# Patient Record
Sex: FEMALE | Race: WHITE | Employment: OTHER | URBAN - METROPOLITAN AREA
[De-identification: names, ages, dates, MRNs, and addresses within clinical notes are randomized per-mention and may not be internally consistent; named-entity substitution may affect disease eponyms.]

---

## 2017-03-28 ENCOUNTER — OFFICE VISIT (OUTPATIENT)
Dept: ORTHOPEDIC SURGERY | Age: 77
End: 2017-03-28

## 2017-03-28 VITALS
DIASTOLIC BLOOD PRESSURE: 85 MMHG | SYSTOLIC BLOOD PRESSURE: 138 MMHG | WEIGHT: 244.93 LBS | HEART RATE: 85 BPM | BODY MASS INDEX: 48.09 KG/M2 | HEIGHT: 60 IN

## 2017-03-28 DIAGNOSIS — Z98.1 S/P ANKLE FUSION: Primary | ICD-10-CM

## 2017-03-28 PROCEDURE — 99213 OFFICE O/P EST LOW 20 MIN: CPT | Performed by: ORTHOPAEDIC SURGERY

## 2017-03-28 PROCEDURE — 73610 X-RAY EXAM OF ANKLE: CPT | Performed by: ORTHOPAEDIC SURGERY

## 2018-06-20 ENCOUNTER — OFFICE VISIT (OUTPATIENT)
Dept: ORTHOPEDIC SURGERY | Age: 78
End: 2018-06-20

## 2018-06-20 VITALS
WEIGHT: 244.93 LBS | HEIGHT: 60 IN | SYSTOLIC BLOOD PRESSURE: 101 MMHG | BODY MASS INDEX: 48.09 KG/M2 | DIASTOLIC BLOOD PRESSURE: 78 MMHG | HEART RATE: 74 BPM

## 2018-06-20 DIAGNOSIS — M79.671 RIGHT FOOT PAIN: ICD-10-CM

## 2018-06-20 DIAGNOSIS — S92.324A CLOSED NONDISPLACED FRACTURE OF SECOND METATARSAL BONE OF RIGHT FOOT, INITIAL ENCOUNTER: ICD-10-CM

## 2018-06-20 DIAGNOSIS — Z98.1 S/P ANKLE FUSION: Primary | ICD-10-CM

## 2018-06-20 PROCEDURE — 28470 CLTX METATARSAL FX WO MNP EA: CPT | Performed by: ORTHOPAEDIC SURGERY

## 2018-06-20 PROCEDURE — 99213 OFFICE O/P EST LOW 20 MIN: CPT | Performed by: ORTHOPAEDIC SURGERY

## 2018-06-21 PROBLEM — S92.324A CLOSED NONDISPLACED FRACTURE OF SECOND METATARSAL BONE OF RIGHT FOOT: Status: ACTIVE | Noted: 2018-06-21

## 2018-07-31 ENCOUNTER — OFFICE VISIT (OUTPATIENT)
Dept: ORTHOPEDIC SURGERY | Age: 78
End: 2018-07-31

## 2018-07-31 VITALS — WEIGHT: 244 LBS | HEIGHT: 60 IN | BODY MASS INDEX: 47.91 KG/M2

## 2018-07-31 DIAGNOSIS — S92.324A CLOSED NONDISPLACED FRACTURE OF SECOND METATARSAL BONE OF RIGHT FOOT, INITIAL ENCOUNTER: Primary | ICD-10-CM

## 2018-07-31 PROCEDURE — 99024 POSTOP FOLLOW-UP VISIT: CPT | Performed by: ORTHOPAEDIC SURGERY

## 2019-07-22 LAB
LEFT VENTRICULAR EJECTION FRACTION, EXTERNAL: 58
LEFT VENTRICULAR EJECTION FRACTION, EXTERNAL: 58
LEFT VENTRICULAR EJECTION FRACTION, EXTERNAL: NORMAL

## 2021-06-19 ENCOUNTER — APPOINTMENT (OUTPATIENT)
Dept: GENERAL RADIOLOGY | Age: 81
DRG: 194 | End: 2021-06-19
Payer: MEDICARE

## 2021-06-19 ENCOUNTER — HOSPITAL ENCOUNTER (INPATIENT)
Age: 81
LOS: 4 days | Discharge: NURSING, REHAB OR LONG-TERM CARE | DRG: 194 | End: 2021-06-23
Attending: EMERGENCY MEDICINE | Admitting: INTERNAL MEDICINE
Payer: MEDICARE

## 2021-06-19 DIAGNOSIS — G95.19 NEUROGENIC CLAUDICATION (HCC): ICD-10-CM

## 2021-06-19 DIAGNOSIS — J18.9 COMMUNITY ACQUIRED PNEUMONIA OF RIGHT LOWER LOBE OF LUNG: Primary | ICD-10-CM

## 2021-06-19 PROBLEM — E66.01 MORBID OBESITY WITH BMI OF 45.0-49.9, ADULT (HCC): Status: ACTIVE | Noted: 2021-06-19

## 2021-06-19 LAB
A/G RATIO: 1.3 (ref 1.1–2.2)
ALBUMIN SERPL-MCNC: 3.5 G/DL (ref 3.4–5)
ALP BLD-CCNC: 76 U/L (ref 40–129)
ALT SERPL-CCNC: 11 U/L (ref 10–40)
ANION GAP SERPL CALCULATED.3IONS-SCNC: 6 MMOL/L (ref 3–16)
AST SERPL-CCNC: 22 U/L (ref 15–37)
BASOPHILS ABSOLUTE: 0.1 K/UL (ref 0–0.2)
BASOPHILS RELATIVE PERCENT: 0.3 %
BILIRUB SERPL-MCNC: 0.7 MG/DL (ref 0–1)
BUN BLDV-MCNC: 15 MG/DL (ref 7–20)
CALCIUM SERPL-MCNC: 9.1 MG/DL (ref 8.3–10.6)
CHLORIDE BLD-SCNC: 105 MMOL/L (ref 99–110)
CO2: 29 MMOL/L (ref 21–32)
CREAT SERPL-MCNC: 0.7 MG/DL (ref 0.6–1.2)
EOSINOPHILS ABSOLUTE: 0 K/UL (ref 0–0.6)
EOSINOPHILS RELATIVE PERCENT: 0.1 %
GFR AFRICAN AMERICAN: >60
GFR NON-AFRICAN AMERICAN: >60
GLOBULIN: 2.7 G/DL
GLUCOSE BLD-MCNC: 157 MG/DL (ref 70–99)
HCT VFR BLD CALC: 33.8 % (ref 36–48)
HEMOGLOBIN: 11 G/DL (ref 12–16)
LACTIC ACID, SEPSIS: 1.1 MMOL/L (ref 0.4–1.9)
LYMPHOCYTES ABSOLUTE: 1.8 K/UL (ref 1–5.1)
LYMPHOCYTES RELATIVE PERCENT: 8.7 %
MCH RBC QN AUTO: 30.1 PG (ref 26–34)
MCHC RBC AUTO-ENTMCNC: 32.6 G/DL (ref 31–36)
MCV RBC AUTO: 92.6 FL (ref 80–100)
MONOCYTES ABSOLUTE: 1.3 K/UL (ref 0–1.3)
MONOCYTES RELATIVE PERCENT: 6.2 %
NEUTROPHILS ABSOLUTE: 17.6 K/UL (ref 1.7–7.7)
NEUTROPHILS RELATIVE PERCENT: 84.7 %
PDW BLD-RTO: 15.5 % (ref 12.4–15.4)
PLATELET # BLD: 204 K/UL (ref 135–450)
PMV BLD AUTO: 8.2 FL (ref 5–10.5)
POTASSIUM REFLEX MAGNESIUM: 4 MMOL/L (ref 3.5–5.1)
PRO-BNP: 390 PG/ML (ref 0–449)
PROCALCITONIN: 0.61 NG/ML (ref 0–0.15)
RBC # BLD: 3.65 M/UL (ref 4–5.2)
SODIUM BLD-SCNC: 140 MMOL/L (ref 136–145)
TOTAL PROTEIN: 6.2 G/DL (ref 6.4–8.2)
TROPONIN: <0.01 NG/ML
WBC # BLD: 20.8 K/UL (ref 4–11)

## 2021-06-19 PROCEDURE — 6370000000 HC RX 637 (ALT 250 FOR IP): Performed by: EMERGENCY MEDICINE

## 2021-06-19 PROCEDURE — 83605 ASSAY OF LACTIC ACID: CPT

## 2021-06-19 PROCEDURE — 71045 X-RAY EXAM CHEST 1 VIEW: CPT

## 2021-06-19 PROCEDURE — 87040 BLOOD CULTURE FOR BACTERIA: CPT

## 2021-06-19 PROCEDURE — 84484 ASSAY OF TROPONIN QUANT: CPT

## 2021-06-19 PROCEDURE — 84145 PROCALCITONIN (PCT): CPT

## 2021-06-19 PROCEDURE — 83880 ASSAY OF NATRIURETIC PEPTIDE: CPT

## 2021-06-19 PROCEDURE — 93005 ELECTROCARDIOGRAM TRACING: CPT | Performed by: EMERGENCY MEDICINE

## 2021-06-19 PROCEDURE — 2580000003 HC RX 258: Performed by: EMERGENCY MEDICINE

## 2021-06-19 PROCEDURE — 94761 N-INVAS EAR/PLS OXIMETRY MLT: CPT

## 2021-06-19 PROCEDURE — 36415 COLL VENOUS BLD VENIPUNCTURE: CPT

## 2021-06-19 PROCEDURE — 1200000000 HC SEMI PRIVATE

## 2021-06-19 PROCEDURE — 96365 THER/PROPH/DIAG IV INF INIT: CPT

## 2021-06-19 PROCEDURE — 6360000002 HC RX W HCPCS: Performed by: EMERGENCY MEDICINE

## 2021-06-19 PROCEDURE — 85025 COMPLETE CBC W/AUTO DIFF WBC: CPT

## 2021-06-19 PROCEDURE — 2700000000 HC OXYGEN THERAPY PER DAY

## 2021-06-19 PROCEDURE — 80053 COMPREHEN METABOLIC PANEL: CPT

## 2021-06-19 PROCEDURE — 99285 EMERGENCY DEPT VISIT HI MDM: CPT

## 2021-06-19 RX ORDER — NICOTINE POLACRILEX 4 MG
15 LOZENGE BUCCAL PRN
Status: DISCONTINUED | OUTPATIENT
Start: 2021-06-19 | End: 2021-06-23 | Stop reason: HOSPADM

## 2021-06-19 RX ORDER — ONDANSETRON 2 MG/ML
4 INJECTION INTRAMUSCULAR; INTRAVENOUS EVERY 6 HOURS PRN
Status: DISCONTINUED | OUTPATIENT
Start: 2021-06-19 | End: 2021-06-23 | Stop reason: HOSPADM

## 2021-06-19 RX ORDER — DEXTROSE MONOHYDRATE 50 MG/ML
100 INJECTION, SOLUTION INTRAVENOUS PRN
Status: DISCONTINUED | OUTPATIENT
Start: 2021-06-19 | End: 2021-06-23 | Stop reason: HOSPADM

## 2021-06-19 RX ORDER — GLIPIZIDE 5 MG/1
2.5 TABLET ORAL
Status: DISCONTINUED | OUTPATIENT
Start: 2021-06-20 | End: 2021-06-20

## 2021-06-19 RX ORDER — GABAPENTIN 300 MG/1
600 CAPSULE ORAL 3 TIMES DAILY
Status: DISCONTINUED | OUTPATIENT
Start: 2021-06-20 | End: 2021-06-22

## 2021-06-19 RX ORDER — DEXTROSE MONOHYDRATE 25 G/50ML
12.5 INJECTION, SOLUTION INTRAVENOUS PRN
Status: DISCONTINUED | OUTPATIENT
Start: 2021-06-19 | End: 2021-06-23 | Stop reason: HOSPADM

## 2021-06-19 RX ORDER — ACETAMINOPHEN 325 MG/1
650 TABLET ORAL EVERY 6 HOURS PRN
Status: DISCONTINUED | OUTPATIENT
Start: 2021-06-19 | End: 2021-06-23 | Stop reason: HOSPADM

## 2021-06-19 RX ORDER — HYDROCODONE BITARTRATE AND ACETAMINOPHEN 7.5; 325 MG/1; MG/1
1 TABLET ORAL 3 TIMES DAILY PRN
Status: DISCONTINUED | OUTPATIENT
Start: 2021-06-19 | End: 2021-06-20

## 2021-06-19 RX ORDER — SODIUM CHLORIDE 9 MG/ML
25 INJECTION, SOLUTION INTRAVENOUS PRN
Status: DISCONTINUED | OUTPATIENT
Start: 2021-06-19 | End: 2021-06-23 | Stop reason: HOSPADM

## 2021-06-19 RX ORDER — HYDROCODONE BITARTRATE AND ACETAMINOPHEN 7.5; 325 MG/1; MG/1
1 TABLET ORAL ONCE
Status: COMPLETED | OUTPATIENT
Start: 2021-06-19 | End: 2021-06-19

## 2021-06-19 RX ORDER — ONDANSETRON 4 MG/1
4 TABLET, ORALLY DISINTEGRATING ORAL EVERY 8 HOURS PRN
Status: DISCONTINUED | OUTPATIENT
Start: 2021-06-19 | End: 2021-06-23 | Stop reason: HOSPADM

## 2021-06-19 RX ORDER — CYCLOBENZAPRINE HCL 10 MG
10 TABLET ORAL 3 TIMES DAILY PRN
Status: DISCONTINUED | OUTPATIENT
Start: 2021-06-19 | End: 2021-06-22

## 2021-06-19 RX ORDER — POLYETHYLENE GLYCOL 3350 17 G/17G
17 POWDER, FOR SOLUTION ORAL DAILY PRN
Status: DISCONTINUED | OUTPATIENT
Start: 2021-06-19 | End: 2021-06-23 | Stop reason: HOSPADM

## 2021-06-19 RX ORDER — NIFEDIPINE 30 MG/1
30 TABLET, EXTENDED RELEASE ORAL DAILY
Status: DISCONTINUED | OUTPATIENT
Start: 2021-06-20 | End: 2021-06-22

## 2021-06-19 RX ORDER — LEVOTHYROXINE SODIUM 0.12 MG/1
250 TABLET ORAL DAILY
Status: DISCONTINUED | OUTPATIENT
Start: 2021-06-20 | End: 2021-06-23 | Stop reason: HOSPADM

## 2021-06-19 RX ORDER — SODIUM CHLORIDE 0.9 % (FLUSH) 0.9 %
10 SYRINGE (ML) INJECTION EVERY 12 HOURS SCHEDULED
Status: DISCONTINUED | OUTPATIENT
Start: 2021-06-20 | End: 2021-06-23 | Stop reason: HOSPADM

## 2021-06-19 RX ORDER — SODIUM CHLORIDE 0.9 % (FLUSH) 0.9 %
10 SYRINGE (ML) INJECTION PRN
Status: DISCONTINUED | OUTPATIENT
Start: 2021-06-19 | End: 2021-06-23 | Stop reason: HOSPADM

## 2021-06-19 RX ORDER — SODIUM CHLORIDE 9 MG/ML
INJECTION, SOLUTION INTRAVENOUS CONTINUOUS
Status: DISCONTINUED | OUTPATIENT
Start: 2021-06-20 | End: 2021-06-21

## 2021-06-19 RX ORDER — ACETAMINOPHEN 650 MG/1
650 SUPPOSITORY RECTAL EVERY 6 HOURS PRN
Status: DISCONTINUED | OUTPATIENT
Start: 2021-06-19 | End: 2021-06-23 | Stop reason: HOSPADM

## 2021-06-19 RX ORDER — DOXYCYCLINE HYCLATE 100 MG
100 TABLET ORAL EVERY 12 HOURS SCHEDULED
Status: DISCONTINUED | OUTPATIENT
Start: 2021-06-20 | End: 2021-06-23 | Stop reason: HOSPADM

## 2021-06-19 RX ORDER — IPRATROPIUM BROMIDE AND ALBUTEROL SULFATE 2.5; .5 MG/3ML; MG/3ML
1 SOLUTION RESPIRATORY (INHALATION)
Status: DISCONTINUED | OUTPATIENT
Start: 2021-06-20 | End: 2021-06-20

## 2021-06-19 RX ADMIN — HYDROCODONE BITARTRATE AND ACETAMINOPHEN 1 TABLET: 7.5; 325 TABLET ORAL at 22:14

## 2021-06-19 RX ADMIN — CEFTRIAXONE SODIUM 1000 MG: 1 INJECTION, POWDER, FOR SOLUTION INTRAMUSCULAR; INTRAVENOUS at 22:10

## 2021-06-19 RX ADMIN — AZITHROMYCIN MONOHYDRATE 500 MG: 500 INJECTION, POWDER, LYOPHILIZED, FOR SOLUTION INTRAVENOUS at 22:41

## 2021-06-19 ASSESSMENT — PAIN SCALES - GENERAL
PAINLEVEL_OUTOF10: 6
PAINLEVEL_OUTOF10: 4

## 2021-06-20 LAB
ANION GAP SERPL CALCULATED.3IONS-SCNC: 7 MMOL/L (ref 3–16)
BASOPHILS ABSOLUTE: 0 K/UL (ref 0–0.2)
BASOPHILS RELATIVE PERCENT: 0.2 %
BUN BLDV-MCNC: 13 MG/DL (ref 7–20)
CALCIUM SERPL-MCNC: 9.3 MG/DL (ref 8.3–10.6)
CHLORIDE BLD-SCNC: 107 MMOL/L (ref 99–110)
CO2: 29 MMOL/L (ref 21–32)
CREAT SERPL-MCNC: 0.6 MG/DL (ref 0.6–1.2)
EKG ATRIAL RATE: 61 BPM
EKG DIAGNOSIS: NORMAL
EKG P AXIS: 19 DEGREES
EKG P-R INTERVAL: 178 MS
EKG Q-T INTERVAL: 408 MS
EKG QRS DURATION: 98 MS
EKG QTC CALCULATION (BAZETT): 410 MS
EKG R AXIS: -28 DEGREES
EKG T AXIS: 11 DEGREES
EKG VENTRICULAR RATE: 61 BPM
EOSINOPHILS ABSOLUTE: 0.1 K/UL (ref 0–0.6)
EOSINOPHILS RELATIVE PERCENT: 0.5 %
GFR AFRICAN AMERICAN: >60
GFR NON-AFRICAN AMERICAN: >60
GLUCOSE BLD-MCNC: 105 MG/DL (ref 70–99)
GLUCOSE BLD-MCNC: 126 MG/DL (ref 70–99)
GLUCOSE BLD-MCNC: 144 MG/DL (ref 70–99)
GLUCOSE BLD-MCNC: 149 MG/DL (ref 70–99)
GLUCOSE BLD-MCNC: 169 MG/DL (ref 70–99)
GLUCOSE BLD-MCNC: 97 MG/DL (ref 70–99)
HCT VFR BLD CALC: 35 % (ref 36–48)
HEMOGLOBIN: 11.4 G/DL (ref 12–16)
LYMPHOCYTES ABSOLUTE: 2 K/UL (ref 1–5.1)
LYMPHOCYTES RELATIVE PERCENT: 12.1 %
MCH RBC QN AUTO: 30.4 PG (ref 26–34)
MCHC RBC AUTO-ENTMCNC: 32.6 G/DL (ref 31–36)
MCV RBC AUTO: 93.3 FL (ref 80–100)
MONOCYTES ABSOLUTE: 1.2 K/UL (ref 0–1.3)
MONOCYTES RELATIVE PERCENT: 7.3 %
NEUTROPHILS ABSOLUTE: 13.5 K/UL (ref 1.7–7.7)
NEUTROPHILS RELATIVE PERCENT: 79.9 %
PDW BLD-RTO: 15.6 % (ref 12.4–15.4)
PERFORMED ON: ABNORMAL
PERFORMED ON: NORMAL
PLATELET # BLD: 203 K/UL (ref 135–450)
PMV BLD AUTO: 8.1 FL (ref 5–10.5)
POTASSIUM REFLEX MAGNESIUM: 3.9 MMOL/L (ref 3.5–5.1)
RBC # BLD: 3.75 M/UL (ref 4–5.2)
SODIUM BLD-SCNC: 143 MMOL/L (ref 136–145)
WBC # BLD: 16.9 K/UL (ref 4–11)

## 2021-06-20 PROCEDURE — 6370000000 HC RX 637 (ALT 250 FOR IP): Performed by: INTERNAL MEDICINE

## 2021-06-20 PROCEDURE — 85025 COMPLETE CBC W/AUTO DIFF WBC: CPT

## 2021-06-20 PROCEDURE — 1200000000 HC SEMI PRIVATE

## 2021-06-20 PROCEDURE — 2580000003 HC RX 258: Performed by: INTERNAL MEDICINE

## 2021-06-20 PROCEDURE — 2580000003 HC RX 258: Performed by: HOSPITALIST

## 2021-06-20 PROCEDURE — 94640 AIRWAY INHALATION TREATMENT: CPT

## 2021-06-20 PROCEDURE — 87449 NOS EACH ORGANISM AG IA: CPT

## 2021-06-20 PROCEDURE — 6360000002 HC RX W HCPCS: Performed by: INTERNAL MEDICINE

## 2021-06-20 PROCEDURE — 80048 BASIC METABOLIC PNL TOTAL CA: CPT

## 2021-06-20 PROCEDURE — 6370000000 HC RX 637 (ALT 250 FOR IP): Performed by: HOSPITALIST

## 2021-06-20 PROCEDURE — 93010 ELECTROCARDIOGRAM REPORT: CPT | Performed by: INTERNAL MEDICINE

## 2021-06-20 RX ORDER — LOSARTAN POTASSIUM 50 MG/1
50 TABLET ORAL DAILY
COMMUNITY

## 2021-06-20 RX ORDER — DOXAZOSIN 2 MG/1
2 TABLET ORAL DAILY
COMMUNITY

## 2021-06-20 RX ORDER — HYDROCODONE BITARTRATE AND ACETAMINOPHEN 10; 325 MG/1; MG/1
1 TABLET ORAL 3 TIMES DAILY
Status: DISCONTINUED | OUTPATIENT
Start: 2021-06-20 | End: 2021-06-23 | Stop reason: HOSPADM

## 2021-06-20 RX ORDER — TIZANIDINE 4 MG/1
4 TABLET ORAL 4 TIMES DAILY
COMMUNITY

## 2021-06-20 RX ORDER — ATORVASTATIN CALCIUM 10 MG/1
10 TABLET, FILM COATED ORAL DAILY
Status: DISCONTINUED | OUTPATIENT
Start: 2021-06-20 | End: 2021-06-23 | Stop reason: HOSPADM

## 2021-06-20 RX ORDER — FENTANYL CITRATE 50 UG/ML
50 INJECTION, SOLUTION INTRAMUSCULAR; INTRAVENOUS ONCE
Status: COMPLETED | OUTPATIENT
Start: 2021-06-20 | End: 2021-06-20

## 2021-06-20 RX ORDER — TIZANIDINE 4 MG/1
4 TABLET ORAL 4 TIMES DAILY
Status: DISCONTINUED | OUTPATIENT
Start: 2021-06-20 | End: 2021-06-21

## 2021-06-20 RX ORDER — IPRATROPIUM BROMIDE AND ALBUTEROL SULFATE 2.5; .5 MG/3ML; MG/3ML
1 SOLUTION RESPIRATORY (INHALATION) EVERY 4 HOURS PRN
Status: DISCONTINUED | OUTPATIENT
Start: 2021-06-20 | End: 2021-06-23 | Stop reason: HOSPADM

## 2021-06-20 RX ORDER — DOXAZOSIN 2 MG/1
2 TABLET ORAL DAILY
Status: DISCONTINUED | OUTPATIENT
Start: 2021-06-20 | End: 2021-06-23 | Stop reason: HOSPADM

## 2021-06-20 RX ORDER — TIZANIDINE 4 MG/1
4 TABLET ORAL 4 TIMES DAILY
Status: DISCONTINUED | OUTPATIENT
Start: 2021-06-20 | End: 2021-06-20

## 2021-06-20 RX ORDER — IPRATROPIUM BROMIDE AND ALBUTEROL SULFATE 2.5; .5 MG/3ML; MG/3ML
1 SOLUTION RESPIRATORY (INHALATION)
Status: DISCONTINUED | OUTPATIENT
Start: 2021-06-20 | End: 2021-06-20

## 2021-06-20 RX ORDER — OMEGA-3S/DHA/EPA/FISH OIL/D3 300MG-1000
400 CAPSULE ORAL DAILY
Status: DISCONTINUED | OUTPATIENT
Start: 2021-06-20 | End: 2021-06-23 | Stop reason: HOSPADM

## 2021-06-20 RX ORDER — HYDROCODONE BITARTRATE AND ACETAMINOPHEN 7.5; 325 MG/1; MG/1
1 TABLET ORAL 3 TIMES DAILY
Status: ON HOLD | COMMUNITY
End: 2021-06-23 | Stop reason: SDUPTHER

## 2021-06-20 RX ORDER — ATORVASTATIN CALCIUM 10 MG/1
10 TABLET, FILM COATED ORAL DAILY
COMMUNITY

## 2021-06-20 RX ORDER — OMEGA-3S/DHA/EPA/FISH OIL/D3 300MG-1000
400 CAPSULE ORAL DAILY
COMMUNITY

## 2021-06-20 RX ADMIN — GABAPENTIN 600 MG: 300 CAPSULE ORAL at 22:32

## 2021-06-20 RX ADMIN — ENOXAPARIN SODIUM 40 MG: 40 INJECTION SUBCUTANEOUS at 08:05

## 2021-06-20 RX ADMIN — TIZANIDINE 4 MG: 4 TABLET ORAL at 09:32

## 2021-06-20 RX ADMIN — FENTANYL CITRATE 50 MCG: 0.05 INJECTION, SOLUTION INTRAMUSCULAR; INTRAVENOUS at 01:25

## 2021-06-20 RX ADMIN — CHOLECALCIFEROL TAB 10 MCG (400 UNIT) 400 UNITS: 10 TAB at 09:32

## 2021-06-20 RX ADMIN — DOXYCYCLINE HYCLATE 100 MG: 100 TABLET, COATED ORAL at 09:32

## 2021-06-20 RX ADMIN — DOXAZOSIN 2 MG: 2 TABLET ORAL at 09:32

## 2021-06-20 RX ADMIN — GABAPENTIN 600 MG: 300 CAPSULE ORAL at 09:32

## 2021-06-20 RX ADMIN — TIZANIDINE 4 MG: 4 TABLET ORAL at 14:30

## 2021-06-20 RX ADMIN — SODIUM CHLORIDE: 9 INJECTION, SOLUTION INTRAVENOUS at 00:16

## 2021-06-20 RX ADMIN — HYDROCODONE BITARTRATE AND ACETAMINOPHEN 1 TABLET: 10; 325 TABLET ORAL at 16:32

## 2021-06-20 RX ADMIN — IPRATROPIUM BROMIDE AND ALBUTEROL SULFATE 1 AMPULE: .5; 3 SOLUTION RESPIRATORY (INHALATION) at 08:30

## 2021-06-20 RX ADMIN — ATORVASTATIN CALCIUM 10 MG: 10 TABLET, FILM COATED ORAL at 09:32

## 2021-06-20 RX ADMIN — IPRATROPIUM BROMIDE AND ALBUTEROL SULFATE 1 AMPULE: .5; 3 SOLUTION RESPIRATORY (INHALATION) at 19:51

## 2021-06-20 RX ADMIN — HYDROCODONE BITARTRATE AND ACETAMINOPHEN 1 TABLET: 10; 325 TABLET ORAL at 20:52

## 2021-06-20 RX ADMIN — DOXYCYCLINE HYCLATE 100 MG: 100 TABLET, COATED ORAL at 20:52

## 2021-06-20 RX ADMIN — INSULIN LISPRO 1 UNITS: 100 INJECTION, SOLUTION INTRAVENOUS; SUBCUTANEOUS at 18:18

## 2021-06-20 RX ADMIN — CEFTRIAXONE SODIUM 1000 MG: 1 INJECTION, POWDER, FOR SOLUTION INTRAMUSCULAR; INTRAVENOUS at 08:29

## 2021-06-20 RX ADMIN — TIZANIDINE 4 MG: 4 TABLET ORAL at 18:18

## 2021-06-20 RX ADMIN — POLYETHYLENE GLYCOL 3350 17 G: 17 POWDER, FOR SOLUTION ORAL at 09:32

## 2021-06-20 RX ADMIN — HYDROCODONE BITARTRATE AND ACETAMINOPHEN 1 TABLET: 10; 325 TABLET ORAL at 08:04

## 2021-06-20 RX ADMIN — SODIUM CHLORIDE: 9 INJECTION, SOLUTION INTRAVENOUS at 17:58

## 2021-06-20 ASSESSMENT — PAIN SCALES - GENERAL
PAINLEVEL_OUTOF10: 2
PAINLEVEL_OUTOF10: 6
PAINLEVEL_OUTOF10: 7
PAINLEVEL_OUTOF10: 5

## 2021-06-20 NOTE — H&P
Nausea & vomiting     PONV    Renal failure     acute renal failure on a previous admission    Septic shock(785.52)     Sleep apnea     does not use cpap    Tailbone injury FEB, 2013    Thyroid disease        Past Surgical History:          Procedure Laterality Date    ABDOMEN SURGERY  2004    Lap Band    ANKLE SURGERY  5-2-2012    Incision, irrigation and debridement right ankle    ANKLE SURGERY  5/4/12    incision and drainage of right ankle    ANKLE SURGERY  5/7/12    incision and drainage right foot.     ANKLE SURGERY Right 10/10/2013    INCISION AND DEBRIDEMENT WITH REMOVAL OF PROSTHESIS RIGHT ANKLE WITH INSERTION OF ANTIBIOTIC SPACER    ANKLE SURGERY Right 1/2/13    RIGHT ANKLE REMOVAL OF ANTIBIOTIC BEADS, TIBIOTALOCALCANEAL FUSION , PLATELET RICH PLASMA INJECTION AND FEMORAL HEAD ALLOGRAFT WITH MINI C ARM BIOMET    BACK SURGERY      Cervical sx    COLONOSCOPY      DEBRIDEMENT  12/04/12    incision and debridement right ankle with application of Acell graft and block for pain control    ENDOSCOPY, COLON, DIAGNOSTIC      JOINT REPLACEMENT      Bilat knees    OTHER SURGICAL HISTORY  4/5/2012    INCISION AND DEBRIDEMENT RIGHT ANKLE WITH APPLICATION OF WOUND VAC    OTHER SURGICAL HISTORY  05/10/2012    i & d right ankle    OTHER SURGICAL HISTORY  05/14/12    incsion and debridement right ankle with placement of wound vac    OTHER SURGICAL HISTORY  7/16/2012    REPEAT DEBRIDEMENT WITH IRRIGATION AND DRAINAGE RIGHT ANKLE    OTHER SURGICAL HISTORY  07/19/12    incision and debridement right ankle    OTHER SURGICAL HISTORY  9/19/12    I&D right ankle    OTHER SURGICAL HISTORY      BILAT THUMB SURGERY    OTHER SURGICAL HISTORY Right 7/31/13    right hip injection    OTHER SURGICAL HISTORY  10-8-13    Incision and drainage right ankle    THYROID SURGERY      TONSILLECTOMY      TOTAL ANKLE ARTHROPLASTY  2/2012    TUBAL LIGATION         Medications Prior to Admission:      Prior to Admission medications    Medication Sig Start Date End Date Taking? Authorizing Provider   diclofenac sodium (VOLTAREN) 1 % GEL Apply 2 g topically 2 times daily 3/28/17   Ingrid Mosqueda MD   meloxicam (MOBIC) 15 MG tablet TAKE ONE TABLET BY MOUTH DAILY 9/13/16   Ingrid Mosqueda MD   diclofenac sodium (VOLTAREN) 1 % GEL Apply 2 g topically 2 times daily 6/8/16   Ingrid Mosqueda MD   diclofenac (FLECTOR) 1.3 % patch Place 1 patch onto the skin 2 times daily Every 12 hours 5/18/16   Ingrid Mosqueda MD   lidocaine (LIDODERM) 5 % Place 1 patch onto the skin every 12 hours. 12 hours on, 12 hours off. 4/14/15   Ingird Mosqueda MD   cyclobenzaprine (FLEXERIL) 10 MG tablet Take 10 mg by mouth 3 times daily as needed for Muscle spasms. Historical Provider, MD   Oxycodone-Acetaminophen ER (XARTEMIS XR) 7.5-325 MG TBCR Take one tablet in the morning and 2 tablets at night. Do not fill until 11/26/14 11/25/14   Bill Obrien MD   levothyroxine (SYNTHROID) 175 MCG tablet Take 250 mcg by mouth Daily. Historical Provider, MD   gabapentin (NEURONTIN) 300 MG capsule Take 300 mg by mouth 2 times daily. Historical Provider, MD   glipiZIDE (GLUCOTROL) 2.5 MG CR tablet Take 2.5 mg by mouth daily. Historical Provider, MD   NIFEdipine (ADALAT CC) 30 MG CR tablet Take 1 tablet by mouth daily. 6/4/12   Hallie Seip, MD       Allergies:  Morphine, Lyrica [pregabalin], Tape Brianda Soda tape], Piperacillin sod-tazobactam so, Sulfa antibiotics, and Zyvox [linezolid]    Social History:      The patient currently lives at home    TOBACCO:   reports that she has never smoked. She has never used smokeless tobacco.  ETOH:   reports no history of alcohol use.   E-Cigarettes/Vaping Use     Questions Responses    E-Cigarette/Vaping Use     Start Date     Passive Exposure     Quit Date     Counseling Given     Comments             Family History:    Positive as follows:        Problem Relation Age of Onset    High Blood Pressure Mother     Cancer Father         lung    Diabetes Sister     Arthritis Sister     Diabetes Brother     Heart Disease Brother        REVIEW OF SYSTEMS COMPLETED:   Pertinent positives as noted in the HPI. All other systems reviewed and negative. PHYSICAL EXAM PERFORMED:    BP (!) 122/48   Pulse 66   Temp 97.4 °F (36.3 °C) (Oral)   Resp 8   Ht 5' (1.524 m)   Wt 237 lb (107.5 kg)   SpO2 90%   BMI 46.29 kg/m²     General appearance:  No apparent distress, appears stated age and cooperative. HEENT:  Normal cephalic, atraumatic without obvious deformity. Pupils equal, round, and reactive to light. Extra ocular muscles intact. Conjunctivae/corneas clear. Neck: Supple, with full range of motion. No jugular venous distention. Trachea midline. Respiratory:  Normal respiratory effort. Clear to auscultation, bilaterally without Rales/Wheezes/Rhonchi. Cardiovascular:  Regular rate and rhythm with normal S1/S2 without murmurs, rubs or gallops. Abdomen: Soft, non-tender, non-distended with normal bowel sounds. Musculoskeletal:  No clubbing, cyanosis bilaterally. Full range of motion without deformity. BLE edema 1+  Skin: Skin color, texture, turgor normal.  No rashes or lesions. Neurologic:  Neurovascularly intact without any focal sensory/motor deficits.  Cranial nerves: II-XII intact, grossly non-focal.  Psychiatric:  Alert and oriented, thought content appropriate, normal insight  Capillary Refill: Brisk,3 seconds, normal  Peripheral Pulses: +2 palpable, equal bilaterally       Labs:     Recent Labs     06/19/21 1956   WBC 20.8*   HGB 11.0*   HCT 33.8*        Recent Labs     06/19/21 1956      K 4.0      CO2 29   BUN 15   CREATININE 0.7   CALCIUM 9.1     Recent Labs     06/19/21 1956   AST 22   ALT 11   BILITOT 0.7   ALKPHOS 76       Radiology:     CXR: I have reviewed the CXR with the following interpretation: Possible right lower lobe infiltrate  EKG:  I have reviewed the EKG with the following interpretation: NSR, rate 61    XR CHEST PORTABLE   Final Result   Limited evaluation. Suspected airspace disease right lower lung, atelectasis versus pneumonia   versus aspiration. ASSESSMENT:PLAN:    CAP [community-acquired pneumonia]  Presented with productive cough/ shortness of breath/ eukocytosis WBC 20.8k  Chest imaging reveals infiltrates in the right lower lobe  Hypoxic on [email or web address] on room air  - Admit to IP  - IV rocephin and doxycycline started 6/19  - sputum culture/gram, blood c/s, strep pneumo and legionella urine ag  - Acapella/I-S  - supplemental O2 to keep sats >92%, currently on 2 LPM oxygen, wean as tolerated  - No wheeze on exam, no need for bronchodilators at this time    DM2-controlled, resume Capozide, added low-dose sliding scale insulin, Accu-Cheks, hypoglycemia protocol    Hypothyroidism-resume levothyroxine, appears clinically euthyroid    HTN -controlled, resume home medication regimen    Morbid obesity with BMI of 57.9-36.9, adult  Complicating assessment and treatment, placing patient at high risk for multiple co-morbidities as well as early death and contributing to the patient's presentation. Counseled on weight loss. Chronic pain syndrome  OARRS reviewed by me, she is on Norco 7.5/325 3 times daily, gabapentin 600 mg 3 times daily, and morphine sulfate powder dose unclear likely for the morphine pain pump. Patient reports her pain has been uncontrolled over the past month. Is supposed to go see a spine surgeon about her pain pump and troubleshoot it. Thinks the pain pump may not be working like it should.   -Increase Norco to 10/325 mg 3 times daily  -Continue morphine pain pump  -Continue gabapentin 600 mg 3 times daily  Fill Date ID   Written Drug Qty Days Prescriber Rx # Pharmacy Refill   Daily Dose* Pymt Type      06/18/2021  1   06/16/2021  Hydrocodone-Acetamin 7.5-325  90.00  30 Gu Debbie   [id number]   [facility] ([id number])   0  22.50 MME  Medicare   [place]   06/14/2021  1   06/14/2021 Morphine Sulfate Powder  0.10  30 Gu Debbie   [id number]   [facility] ([id number])   0  3.33 MME  Medicare [place]   06/04/2021  1   06/03/2021  Hydrocodone-Acetamin 7.5-325  42.00  14 Mi Krish   [id number]   [facility] ([id number])   0  22.50 MME  Medicare [place]   06/04/2021  1   06/03/2021  Gabapentin 600 MG Tablet  270.00  90 Mi Krish   [id number]   [facility] ([id number])   0   Medicare [place]       DVT Prophylaxis: lmwh  Diet: No diet orders on file  Code Status: full  PT/OT Eval Status: pt walks    Dispo - IP stay       Barry Aaron MD    Thank you Hugo Serrano for the opportunity to be involved in this patient's care. If you have any questions or concerns please feel free to contact me at [phone].

## 2021-06-20 NOTE — PROGRESS NOTES
06/20/21 1955   RT Protocol   Smoking Status 0   Surgical status 0   Xray 2   Respiratory pattern 0   Mental Status 0   Breath sounds 0   Cough 0   Activity level 0   Oxygen Requirement 0   Indications for Bronchodilator Therapy None   Bronchodilator Assessment Score 2   Indications for Bronchial Hygiene None   Bronchial Hygiene Assessment Score 2   Indications for Volume Expansion None   Volume Expansion Assessment Score 2

## 2021-06-20 NOTE — ED PROVIDER NOTES
CHIEF COMPLAINT  Cough (pt to ED with cough for a week or so. generalized fatigue. supposed to take water pill but has a hard time getting up and down so she doesnt take it) and Fatigue      HISTORY OF PRESENT ILLNESS  Carolina Allen is a [de-identified] y.o. female with a history of anxiety and depression; osteoporosis; thyroid cancer; cellulitis; degenerative disc disease with lumbosacral pain and pain pump; diabetic neuropathy; fibromyalgia; hypertension; who presents to the ED complaining of cough to 5 yellow sputum for about a week or so and generalized fatigue. Had a Covid vaccination with [place] vaccine in February. Supposed to take her water pill but has had a hard time getting up and taking her medications. No fever. Pain pump not working well. No change in bowel or bladder control. .   No other complaints, modifying factors or associated symptoms. I have reviewed the following from the nursing documentation.     Past Medical History:   Diagnosis Date    Anesthesia PONV    Anxiety and depression     Arthritis     osteoporosis    Cancer ([address]     Thyroid    Cellulitis 11/2012    Left Foot and Ankle    Colonization status 6/7/12    DNA probe negative for MRSA    DDD (degenerative disc disease), lumbosacral 9/29/2011    Diabetes mellitus ([address]     Diabetic neuropathy ([address]     DJD (degenerative joint disease) of lumbar spine 9/1/2011    Fall     Fibromyalgia     GERD (gastroesophageal reflux disease)     Hearing loss     [place] (hard of hearing)     Hypertension     Infection     RIGHT ANKLE    Liver disease     fatty tissue on liver    MRSA (methicillin resistant staph aureus) culture positive 5/8/12; 5/2/12,9/19/12    Ankle    MRSA (methicillin resistant staph aureus) culture positive 10/8/13    ankle    Nausea & vomiting     PONV    Renal failure     acute renal failure on a previous admission    Septic shock(785.52)     Sleep apnea     does not use cpap    Tailbone injury FEB, 2013    status:      Spouse name: Not on file    Number of children: Not on file    Years of education: Not on file    Highest education level: Not on file   Occupational History    Not on file   Tobacco Use    Smoking status: Never Smoker    Smokeless tobacco: Never Used   Substance and Sexual Activity    Alcohol use: No    Drug use: No    Sexual activity: Not Currently   Other Topics Concern    Not on file   Social History Narrative    Not on file     Social Determinants of Health     Financial Resource Strain:     Difficulty of Paying Living Expenses:    Food Insecurity:     Worried About Running Out of Food in the Last Year:     [address] in the Last Year:    Transportation Needs:     Lack of Transportation (Medical):      Lack of Transportation (Non-Medical):    Physical Activity:     Days of Exercise per Week:     Minutes of Exercise per Session:    Stress:     Feeling of Stress :    Social Connections:     Frequency of Communication with Friends and Family:     Frequency of Social Gatherings with Friends and Family:     Attends Alevism Services:     Active Member of Clubs or Organizations:     Attends Club or Organization Meetings:     Marital Status:    Intimate Partner Violence:     Fear of Current or Ex-Partner:     Emotionally Abused:     Physically Abused:     Sexually Abused:      Current Facility-Administered Medications   Medication Dose Route Frequency Provider Last Rate Last Admin    atorvastatin (LIPITOR) tablet 10 mg  10 mg Oral Daily Rj Fishman MD   10 mg at 06/20/21 0932    doxazosin (CARDURA) tablet 2 mg  2 mg Oral Daily Rj Fishman MD   2 mg at 06/20/21 0932    insulin lispro protamine & lispro (HUMALOG MIX) (75-25) 100 UNIT per ML injection vial SUSP 20 Units  20 Units Subcutaneous Daily with breakfast Rj Fishman MD        vitamin D3 (CHOLECALCIFEROL) tablet 400 Units  400 Units Oral Daily Rj Fishman MD   400 Units at 06/20/21 0932    HYDROcodone-acetaminophen (NORCO)  MG per tablet 1 tablet  1 tablet Oral TID Ondina Summers MD   1 tablet at 06/20/21 2052    tiZANidine (ZANAFLEX) tablet 4 mg  4 mg Oral 4x Daily Aylin Avila MD   4 mg at 06/20/21 1818    ipratropium-albuterol (DUONEB) nebulizer solution 1 ampule  1 ampule Inhalation Q4H PRN Ondina Summers MD        cyclobenzaprine (FLEXERIL) tablet 10 mg  10 mg Oral TID PRN Ondina Summers MD        diclofenac sodium (VOLTAREN) 1 % gel 2 g  2 g Topical 4x Daily PRN Ondina Summers MD        gabapentin (NEURONTIN) capsule 600 mg  600 mg Oral TID Ondina Summers MD   600 mg at 06/20/21 2232    levothyroxine (SYNTHROID) tablet 250 mcg  250 mcg Oral Daily Ondina Summers MD        NIFEdipine (PROCARDIA XL) extended release tablet 30 mg  30 mg Oral Daily Ondina Summers MD        0.9 % sodium chloride infusion   Intravenous Continuous Aylin Avila MD 50 mL/hr at 06/21/21 0231 Rate Verify at 06/21/21 0231    sodium chloride flush 0.9 % injection 10 mL  10 mL Intravenous 2 times per day Ondina Summers MD        sodium chloride flush 0.9 % injection 10 mL  10 mL Intravenous PRN Ondina Summers MD        0.9 % sodium chloride infusion  25 mL Intravenous PRN Ondina Summers MD        enoxaparin (LOVENOX) injection 40 mg  40 mg Subcutaneous Daily Ondina Summers MD   40 mg at 06/20/21 0805    ondansetron (ZOFRAN-ODT) disintegrating tablet 4 mg  4 mg Oral Q8H PRN Ondina Summers MD        Or    ondansetron (ZOFRAN) injection 4 mg  4 mg Intravenous Q6H PRN Ondina Summers MD        polyethylene glycol (GLYCOLAX) packet 17 g  17 g Oral Daily PRN Ondina Summers MD   17 g at 06/20/21 0932    acetaminophen (TYLENOL) tablet 650 mg  650 mg Oral Q6H PRN Ondina Summers MD        Or    acetaminophen (TYLENOL) suppository 650 mg  650 mg Rectal Q6H PRN MD Floyd Hoffmanine Ping cefTRIAXone (ROCEPHIN) 1000 mg IVPB in 50 mL D5W minibag  1,000 mg Intravenous Q24H Kristian Aparicio MD   Stopped at 06/20/21 3861    And    doxycycline hyclate (VIBRA-TABS) tablet 100 mg  100 mg Oral 2 times per day Kristian Aparicio MD   100 mg at 06/20/21 2052    glucose (GLUTOSE) 40 % oral gel 15 g  15 g Oral PRN Kristian Aparicio MD        dextrose 50 % IV solution  12.5 g Intravenous PRN Kristian Aparicio MD        glucagon (rDNA) injection 1 mg  1 mg Intramuscular PRN Kristian Aparicio MD        dextrose 5 % solution  100 mL/hr Intravenous PRN Kristian Aparicio MD        insulin lispro (HUMALOG) injection vial 0-6 Units  0-6 Units Subcutaneous TID WC Kristian Aparicio MD   1 Units at 06/20/21 1818    insulin lispro (HUMALOG) injection vial 0-3 Units  0-3 Units Subcutaneous Nightly Kristian Aparicio MD         Allergies   Allergen Reactions    Morphine Shortness Of Breath    Lyrica [Pregabalin] Swelling    Tape [place] Tape] Other (See Comments)     BLISTER    Piperacillin Sod-Tazobactam So Itching and Rash    Sulfa Antibiotics Rash    Zyvox [Linezolid] Nausea And Vomiting       REVIEW OF SYSTEMS  10 systems reviewed, pertinent positives per HPI otherwise noted to be negative. PHYSICAL EXAM  BP (!) 192/73   Pulse 85   Temp 97.6 °F (36.4 °C) (Oral)   Resp 18   Ht 5' (1.524 m)   Wt 237 lb (107.5 kg)   SpO2 94%   BMI 46.29 kg/m²   GENERAL APPEARANCE: Awake and alert. Cooperative. No acute distress. HEAD: Normocephalic. Atraumatic. EYES: PERRL. EOM's grossly intact. ENT: Mucous membranes are moist.   NECK: Supple, trachea midline. HEART: RRR. Normal S1S2, no rubs, gallops, or murmurs noted  LUNGS: Respirations unlabored. CTAB. Good air exchange. No wheezes, rales, or rhonchi. Speaking comfortably in full sentences. ABDOMEN: Soft. Non-distended. Non-tender. Morbidly obese; No guarding or rebound. Normal bowel sounds. EXTREMITIES: 1+ LE peripheral edema. MAEE. No acute deformities. SKIN: Warm and dry. No acute rashes. NEUROLOGICAL: Alert and oriented X 3. CN II-XII intact. No gross facial drooping. Strength 5/5, sensation intact. Normal coordination. No pronator drift. No truncal instability   PSYCHIATRIC: Normal mood and affect. LABS  I have reviewed all labs for this visit. Results for orders placed or performed during the hospital encounter of 06/19/21   Culture, Blood 1    Specimen: Blood   Result Value Ref Range    Blood Culture, Routine       No Growth to date. Any change in status will be called. Culture, Blood 2    Specimen: Blood   Result Value Ref Range    Culture, Blood 2       No Growth to date. Any change in status will be called.    CBC auto differential   Result Value Ref Range    WBC 20.8 (H) 4.0 - 11.0 K/uL    RBC 3.65 (L) 4.00 - 5.20 M/uL    Hemoglobin 11.0 (L) 12.0 - 16.0 g/dL    Hematocrit 33.8 (L) 36.0 - 48.0 %    MCV 92.6 80.0 - 100.0 fL    MCH 30.1 26.0 - 34.0 pg    MCHC 32.6 31.0 - 36.0 g/dL    RDW 15.5 (H) 12.4 - 15.4 %    Platelets 833 595 - 219 K/uL    MPV 8.2 5.0 - 10.5 fL    Neutrophils % 84.7 %    Lymphocytes % 8.7 %    Monocytes % 6.2 %    Eosinophils % 0.1 %    Basophils % 0.3 %    Neutrophils Absolute 17.6 (H) 1.7 - 7.7 K/uL    Lymphocytes Absolute 1.8 1.0 - 5.1 K/uL    Monocytes Absolute 1.3 0.0 - 1.3 K/uL    Eosinophils Absolute 0.0 0.0 - 0.6 K/uL    Basophils Absolute 0.1 0.0 - 0.2 K/uL   Comprehensive Metabolic Panel w/ Reflex to MG   Result Value Ref Range    Sodium 140 136 - 145 mmol/L    Potassium reflex Magnesium 4.0 3.5 - 5.1 mmol/L    Chloride 105 99 - 110 mmol/L    CO2 29 21 - 32 mmol/L    Anion Gap 6 3 - 16    Glucose 157 (H) 70 - 99 mg/dL    BUN 15 7 - 20 mg/dL    CREATININE 0.7 0.6 - 1.2 mg/dL    GFR Non-African American >60 >60    GFR African American >60 >60    Calcium 9.1 8.3 - 10.6 mg/dL    Total Protein 6.2 (L) 6.4 - 8.2 g/dL    Albumin 3.5 3.4 - 5.0 g/dL    Albumin/Globulin Ratio 1.3 1.1 - 2.2    Total Bilirubin 0.7 0.0 - 1.0 mg/dL    Alkaline Phosphatase 76 40 - 129 U/L    ALT 11 10 - 40 U/L    AST 22 15 - 37 U/L    Globulin 2.7 g/dL   Brain Natriuretic Peptide   Result Value Ref Range    Pro- 0 - 449 pg/mL   Lactate, Sepsis   Result Value Ref Range    Lactic Acid, Sepsis 1.1 0.4 - 1.9 mmol/L   Troponin   Result Value Ref Range    Troponin <0.01 <0.01 ng/mL   Procalcitonin   Result Value Ref Range    Procalcitonin 0.61 (H) 0.00 - 0.15 ng/mL   Basic Metabolic Panel w/ Reflex to MG   Result Value Ref Range    Sodium 143 136 - 145 mmol/L    Potassium reflex Magnesium 3.9 3.5 - 5.1 mmol/L    Chloride 107 99 - 110 mmol/L    CO2 29 21 - 32 mmol/L    Anion Gap 7 3 - 16    Glucose 126 (H) 70 - 99 mg/dL    BUN 13 7 - 20 mg/dL    CREATININE 0.6 0.6 - 1.2 mg/dL    GFR Non-African American >60 >60    GFR African American >60 >60    Calcium 9.3 8.3 - 10.6 mg/dL   CBC auto differential   Result Value Ref Range    WBC 16.9 (H) 4.0 - 11.0 K/uL    RBC 3.75 (L) 4.00 - 5.20 M/uL    Hemoglobin 11.4 (L) 12.0 - 16.0 g/dL    Hematocrit 35.0 (L) 36.0 - 48.0 %    MCV 93.3 80.0 - 100.0 fL    MCH 30.4 26.0 - 34.0 pg    MCHC 32.6 31.0 - 36.0 g/dL    RDW 15.6 (H) 12.4 - 15.4 %    Platelets 865 195 - 841 K/uL    MPV 8.1 5.0 - 10.5 fL    Neutrophils % 79.9 %    Lymphocytes % 12.1 %    Monocytes % 7.3 %    Eosinophils % 0.5 %    Basophils % 0.2 %    Neutrophils Absolute 13.5 (H) 1.7 - 7.7 K/uL    Lymphocytes Absolute 2.0 1.0 - 5.1 K/uL    Monocytes Absolute 1.2 0.0 - 1.3 K/uL    Eosinophils Absolute 0.1 0.0 - 0.6 K/uL    Basophils Absolute 0.0 0.0 - 0.2 K/uL   POCT Glucose   Result Value Ref Range    POC Glucose 144 (H) 70 - 99 mg/dl    Performed on ACCU-CHEK    POCT Glucose   Result Value Ref Range    POC Glucose 105 (H) 70 - 99 mg/dl    Performed on ACCU-CHEK    POCT Glucose   Result Value Ref Range    POC Glucose 149 (H) 70 - 99 mg/dl    Performed on ACCU-CHEK    POCT Glucose   Result Value Ref Range    POC Glucose 169 (H) 70 - 99 mg/dl    Performed on ACCU-CHEK    POCT Glucose   Result Value Ref Range    POC Glucose 97 70 - 99 mg/dl    Performed on ACCU-CHEK    EKG 12 Lead   Result Value Ref Range    Ventricular Rate 61 BPM    Atrial Rate 61 BPM    P-R Interval 178 ms    QRS Duration 98 ms    Q-T Interval 408 ms    QTc Calculation (Bazett) 410 ms    P Axis 19 degrees    R Axis -28 degrees    T Axis 11 degrees    Diagnosis       Normal sinus rhythm with sinus arrhythmiaNormal ECGWhen compared with ECG of 22-MAR-2015 14:44,No significant change was foundConfirmed by PAULINO Hayden MD ([id number]) on 6/20/2021 7:30:09 AM       EKG  The Ekg interpreted by myself  normal sinus rhythm with a rate of 61 with sinus arrhythmia  Axis is   Left axis deviation  QTc is  410  Intervals and Durations are unremarkable. No specific ST-T wave changes appreciated. No evidence of acute ischemia. No significant change from prior EKG dated March 22, 2015    Cardiac Monitoring: Sinus rhythm without ectopy        RADIOLOGY  X-RAYS:  I have reviewed radiologic plain film image(s). ALL OTHER NON-PLAIN FILM IMAGES SUCH AS CT, ULTRASOUND AND MRI HAVE BEEN READ BY THE RADIOLOGIST. XR CHEST PORTABLE   Final Result   Limited evaluation. Suspected airspace disease right lower lung, atelectasis versus pneumonia   versus aspiration. Rechecks: Physical assessment performed. No distress in the emergency department. ED COURSE/MDM  Patient seen and evaluated. Old records reviewed. Labs and imaging reviewed and results discussed with patient. Patient was given antibiotics and oxygen supplementation in the ED with good symptomatic relief. Patient was reassessed as noted above. Found to have evidence of pneumonia with leukocytosis; afebrile; adequate blood pressure; normal lactate. Plan of care discussed with patient and family. Patient and family in agreement with plan. Patient was given scripts for the following medications.  I counseled patient how to take these medications. Current Discharge Medication List          CLINICAL IMPRESSION  1. Community acquired pneumonia of right lower lobe of lung        Blood pressure (!) 192/73, pulse 85, temperature 97.6 °F (36.4 °C), temperature source Oral, resp. rate 18, height 5' (1.524 m), weight 237 lb (107.5 kg), SpO2 94 %. DISPOSITION  Dariana Heath was admitted in guareded condition.        Sada Quintero MD  06/21/21 [id number]

## 2021-06-20 NOTE — PROGRESS NOTES
Pt alert and oriented, VSS. Shift assessment completed and documented. Pt c/o back and BLE pain 7/10 pain after Norco was given, MD aware of pts pain. Pt has crackles right lower lobe. Pt has +2 pitting edema BLE, MD asked if IV fluids can be stopped, waiting on reply. Pt denies any needs at this time. Bed locked and in lowest position. Call light within reach.

## 2021-06-20 NOTE — PROGRESS NOTES
Report received from ED RN. Pt transported to room 330 via stretcher by floor RN. Pt is alert and oriented but some confusion. VSS. RA. Pt c/o pain in legs. Pt was given PRN pain medication in ED before coming to ED. 4 eye skin assessment completed with Ekta Drummond. Shonda in place. Admissions assessment completed and documented. Call light within reach. Bed side table within reach. Wheels locked. Bed in lowest position. Bed check in place. Pt instructed to call out for assistance. Pt expressesed understanding & calls out appropritately. All care per orders. Will continue to monitor.  Electronically signed by Karoline Laguerre RN on 6/20/2021 at 12:58 AM

## 2021-06-20 NOTE — PROGRESS NOTES
Hospitalist Progress Note      PCP: Yaneli Joshi    Date of Admission: 6/19/2021    Chief Complaint: Cough shortness of breath    Hospital Course:  This is a 81-year-old female admitted with community-acquired pneumonia, acute respiratory failure with hypoxia on admission now on room air on IV Rocephin and doxycycline    Subjective: Patient states feeling so-so on room air complains of her cough no sputum no chest pain no nausea vomiting decreased p.o. intake      Medications:  Reviewed    Infusion Medications    sodium chloride 75 mL/hr at 06/20/21 0600    sodium chloride      dextrose       Scheduled Medications    atorvastatin  10 mg Oral Daily    doxazosin  2 mg Oral Daily    insulin lispro protamine & lispro  20 Units Subcutaneous Daily with breakfast    tiZANidine  4 mg Oral 4x Daily    vitamin D3  400 Units Oral Daily    HYDROcodone-acetaminophen  1 tablet Oral TID    ipratropium-albuterol  1 ampule Inhalation Q4H WA    gabapentin  600 mg Oral TID    glipiZIDE  2.5 mg Oral QAM AC    levothyroxine  250 mcg Oral Daily    NIFEdipine  30 mg Oral Daily    sodium chloride flush  10 mL Intravenous 2 times per day    enoxaparin  40 mg Subcutaneous Daily    cefTRIAXone (ROCEPHIN) IV  1,000 mg Intravenous Q24H    And    doxycycline hyclate  100 mg Oral 2 times per day    insulin lispro  0-6 Units Subcutaneous TID WC    insulin lispro  0-3 Units Subcutaneous Nightly     PRN Meds: cyclobenzaprine, diclofenac sodium, sodium chloride flush, sodium chloride, ondansetron **OR** ondansetron, polyethylene glycol, acetaminophen **OR** acetaminophen, glucose, dextrose, glucagon (rDNA), dextrose      Intake/Output Summary (Last 24 hours) at 6/20/2021 0918  Last data filed at 6/20/2021 0602  Gross per 24 hour   Intake 905.51 ml   Output --   Net 905.51 ml       Physical Exam Performed:    BP (!) 186/80   Pulse 70   Temp 98 °F (36.7 °C) (Oral)   Resp 16   Ht 5' (1.524 m)   Wt 237 lb (107.5 kg) SpO2 95%   BMI 46.29 kg/m²     General appearance: No apparent distress, appears stated age and cooperative. HEENT: Pupils equal, round, and reactive to light. Conjunctivae/corneas clear. Neck: Supple, with full range of motion. No jugular venous distention. Trachea midline. Respiratory:  Normal respiratory effort. Clear to auscultation, bilaterally without Rales/Wheezes/Rhonchi. Cardiovascular: Regular rate and rhythm with normal S1/S2 without murmurs, rubs or gallops. Abdomen: Soft, non-tender, non-distended with normal bowel sounds. Musculoskeletal: No clubbing, cyanosis or edema bilaterally. Full range of motion without deformity. Skin: Skin color, texture, turgor normal.  No rashes or lesions. Neurologic:  Neurovascularly intact without any focal sensory/motor deficits. Cranial nerves: II-XII intact, grossly non-focal.  Psychiatric: Alert and oriented, thought content appropriate, normal insight  Capillary Refill: Brisk,3 seconds, normal   Peripheral Pulses: +2 palpable, equal bilaterally       Labs:   Recent Labs     06/19/21 1956 06/20/21  0352   WBC 20.8* 16.9*   HGB 11.0* 11.4*   HCT 33.8* 35.0*    203     Recent Labs     06/19/21 1956 06/20/21  0352    143   K 4.0 3.9    107   CO2 29 29   BUN 15 13   CREATININE 0.7 0.6   CALCIUM 9.1 9.3     Recent Labs     06/19/21 1956   AST 22   ALT 11   BILITOT 0.7   ALKPHOS 76     No results for input(s): INR in the last 72 hours. Recent Labs     06/19/21 1956   TROPONINI <0.01       Urinalysis:      Lab Results   Component Value Date    NITRU Negative 03/22/2015    WBCUA 3-5 11/02/2013    BACTERIA 1+ 11/02/2013    RBCUA 3-5 11/02/2013    BLOODU TRACE-INTACT 03/22/2015    SPECGRAV 1.015 03/22/2015    GLUCOSEU Negative 03/22/2015    GLUCOSEU NEGATIVE 06/06/2012       Radiology:  XR CHEST PORTABLE   Final Result   Limited evaluation. Suspected airspace disease right lower lung, atelectasis versus pneumonia   versus aspiration. Assessment/Plan:    Active Hospital Problems    Diagnosis     Morbid obesity with BMI of 45.0-49.9, adult ([facility] [E66.01, Z68.42]     PNA (pneumonia) [J18.9]     Hypothyroid [E03.9]     Chronic pain disorder [G89.4]     Hypertension [I10]     Diabetes mellitus ([facility] [E11.9]      1. This is a 81-year-old female admitted with a continued cold pneumonia on IV Rocephin and doxycycline continue with the supportive care, inhalers. Elevated procalcitonin level. Leukocytosis check daily CBC and BMP. 2.  Diabetes mellitus type 2 discontinue p.o. medication continue with a low sliding scale check hemoglobin A1c.  3.  Hypothyroidism continue with Synthroid. 4.  Hypertension controlled continue with home medication. 5.  Morbid obesity BMI 46.29  6. Chronic pain syndrome on Norco, gabapentin has a pain pump in place. DVT Prophylaxis: Lovenox subcu  Diet: ADULT DIET;  Regular; 4 carb choices (60 gm/meal)  Code Status: Full Code    PT/OT Eval Status: Consulted    Dispo -2- 3 days    Thor Gaona MD

## 2021-06-20 NOTE — PROGRESS NOTES
4 Eyes Skin Assessment     The patient is being assess for  Admission    I agree that 2 RN's have performed a thorough Head to Toe Skin Assessment on the patient. ALL assessment sites listed below have been assessed. Areas assessed by both nurses: Fabian Zamora RN   [x]   Head, Face, and Ears   [x]   Shoulders, Back, and Chest  [x]   Arms, Elbows, and Hands   [x]   Coccyx, Sacrum, and IschIum  [x]   Legs, Feet, and Heels        Does the Patient have Skin Breakdown?   No         Carlos Alberto Prevention initiated:  No   Wound Care Orders initiated:  No      [facility] nurse consulted for Pressure Injury (Stage 3,4, Unstageable, DTI, NWPT, and Complex wounds), New and Established Ostomies:  No      Nurse 1 eSignature: Electronically signed by Olya Johnson RN on 6/20/21 at 12:53 AM EDT    **SHARE this note so that the co-signing nurse is able to place an eSignature**    Nurse 2 eSignature: {Esignature:[id number]}

## 2021-06-20 NOTE — RT PROTOCOL NOTE
RT Inhaler-Nebulizer Bronchodilator Protocol Note    There is a bronchodilator order in the chart from a provider indicating to follow the RT Bronchodilator Protocol and there is an Initiate RT Bronchodilator Protocol order as well (see protocol at bottom of note). The findings from the last RT Protocol Assessment were as follows:  Smoking: Non smoker  Surgical Status: No surgery  Xray: One lobe infiltrates/atelectasis/pleural effusion/edema  Respiratory Pattern: RR 12-20  Mental Status: Alert and Oriented  Breath Sounds: Diminished and/or crackles  Cough: Strong, productive  Activity Level: Walking with assistance  Oxygen Requirement: Room Air - 2LNC/28% or home setting  Indication for Bronchodilator Therapy: None  Bronchodilator Assessment Score: 4    Aerosolized bronchodilator medication orders have been revised according to the RT Bronchodilator Protocol. RT Inhaler-Nebulizer Bronchodilator Protocol:    Respiratory Therapist to perform RT Therapy Protocol Assessment then follow the protocol. No Indications - adjust the frequency to every 6 hours PRN wheezing or bronchospasm, if no treatments needed after 48 hours then discontinue using Per Protocol order mode. If indication present, adjust the RT bronchodilator orders based on the Bronchodilator Assessment Score as follows:    0-6 - enter or revise RT bronchodilator order to Albuterol Inhaler order with frequency of every 2 hours PRN for wheezing or increased work of breathing using Per Protocol order mode. If Albuterol Inhaler not tolerated or not effective, then discontinue the Albuterol Inhaler order and enter Albuterol Nebulizer order with same frequency and PRN reasons. Repeat RT Therapy Protocol Assessment as needed.     7-10 - discontinue any other Inpatient aerosolized bronchodilator medication orders and enter or revise two Albuterol Inhaler orders, one with BID frequency and one with frequency of every 2 hours PRN wheezing or increased work of breathing using Per Protocol order mode. Repeat RT Therapy Protocol Assessment with second treatment then BID and as needed. If Albuterol Inhaler not tolerated or not effective, then discontinue the Albuterol Inhaler orders and enter two Albuterol Nebulizer orders with same frequencies and PRN reasons. 11-13 - discontinue any other Inpatient aerosolized bronchodilator medication orders and enter DuoNeb Nebulizer orders QID frequency and an Albuterol Nebulizer order every 2 hours PRN wheezing or increased work of breathing using Per Protocol order mode. Repeat RT Therapy Protocol Assessment with second treatment then QID and as needed. Greater than 13 - discontinue any other Inpatient bronchodilator aerosolized medication orders and enter DuoNeb Nebulizer order every 4 hours frequency and Albuterol Nebulizer every 2 hours PRN wheezing or increased work of breathing using Per Protocol order mode. Repeat RT Therapy Protocol Assessment with second treatment then every 4 hours and as needed. RT to enter RT Home Evaluation for COPD & MDI Assessment order using Per Protocol order mode.     Electronically signed by Joe Gleason RCP on 6/20/2021 at 1:07 AM

## 2021-06-20 NOTE — PROGRESS NOTES
Message sent to hospitalist \"Pts daughter Anders Lesch would like to talk to you, she is here in the patients room or her phone number is [phone]. Also, pt states she was having difficulty urinating yesterday and her daughter was asking if we can get a UA.  Thanks\"

## 2021-06-21 LAB
ANION GAP SERPL CALCULATED.3IONS-SCNC: 9 MMOL/L (ref 3–16)
BILIRUBIN URINE: NEGATIVE
BLOOD, URINE: NEGATIVE
BUN BLDV-MCNC: 11 MG/DL (ref 7–20)
CALCIUM SERPL-MCNC: 9.6 MG/DL (ref 8.3–10.6)
CHLORIDE BLD-SCNC: 108 MMOL/L (ref 99–110)
CLARITY: ABNORMAL
CO2: 27 MMOL/L (ref 21–32)
COLOR: YELLOW
CREAT SERPL-MCNC: <0.5 MG/DL (ref 0.6–1.2)
EKG ATRIAL RATE: 82 BPM
EKG DIAGNOSIS: NORMAL
EKG P AXIS: 38 DEGREES
EKG P-R INTERVAL: 252 MS
EKG Q-T INTERVAL: 384 MS
EKG QRS DURATION: 108 MS
EKG QTC CALCULATION (BAZETT): 448 MS
EKG R AXIS: -15 DEGREES
EKG T AXIS: 75 DEGREES
EKG VENTRICULAR RATE: 82 BPM
EPITHELIAL CELLS, UA: NORMAL /HPF (ref 0–5)
ESTIMATED AVERAGE GLUCOSE: 108.3 MG/DL
GFR AFRICAN AMERICAN: >60
GFR NON-AFRICAN AMERICAN: >60
GLUCOSE BLD-MCNC: 102 MG/DL (ref 70–99)
GLUCOSE BLD-MCNC: 104 MG/DL (ref 70–99)
GLUCOSE BLD-MCNC: 110 MG/DL (ref 70–99)
GLUCOSE BLD-MCNC: 111 MG/DL (ref 70–99)
GLUCOSE BLD-MCNC: 147 MG/DL (ref 70–99)
GLUCOSE BLD-MCNC: 59 MG/DL (ref 70–99)
GLUCOSE BLD-MCNC: 60 MG/DL (ref 70–99)
GLUCOSE URINE: NEGATIVE MG/DL
HBA1C MFR BLD: 5.4 %
HCT VFR BLD CALC: 35.6 % (ref 36–48)
HEMOGLOBIN: 11.9 G/DL (ref 12–16)
KETONES, URINE: NEGATIVE MG/DL
L. PNEUMOPHILA SEROGP 1 UR AG: NORMAL
LEUKOCYTE ESTERASE, URINE: ABNORMAL
MCH RBC QN AUTO: 30.7 PG (ref 26–34)
MCHC RBC AUTO-ENTMCNC: 33.4 G/DL (ref 31–36)
MCV RBC AUTO: 91.9 FL (ref 80–100)
MICROSCOPIC EXAMINATION: YES
NITRITE, URINE: NEGATIVE
PDW BLD-RTO: 15.3 % (ref 12.4–15.4)
PERFORMED ON: ABNORMAL
PH UA: 6.5 (ref 5–8)
PLATELET # BLD: 222 K/UL (ref 135–450)
PMV BLD AUTO: 8.5 FL (ref 5–10.5)
POTASSIUM SERPL-SCNC: 3.8 MMOL/L (ref 3.5–5.1)
PROTEIN UA: NEGATIVE MG/DL
RBC # BLD: 3.87 M/UL (ref 4–5.2)
RBC UA: NORMAL /HPF (ref 0–4)
SODIUM BLD-SCNC: 144 MMOL/L (ref 136–145)
SPECIFIC GRAVITY UA: 1.01 (ref 1–1.03)
STREP PNEUMONIAE ANTIGEN, URINE: NORMAL
URINE REFLEX TO CULTURE: ABNORMAL
URINE TYPE: ABNORMAL
UROBILINOGEN, URINE: 0.2 E.U./DL
WBC # BLD: 9.7 K/UL (ref 4–11)
WBC UA: NORMAL /HPF (ref 0–5)

## 2021-06-21 PROCEDURE — 2580000003 HC RX 258: Performed by: INTERNAL MEDICINE

## 2021-06-21 PROCEDURE — 97166 OT EVAL MOD COMPLEX 45 MIN: CPT

## 2021-06-21 PROCEDURE — 80048 BASIC METABOLIC PNL TOTAL CA: CPT

## 2021-06-21 PROCEDURE — 85027 COMPLETE CBC AUTOMATED: CPT

## 2021-06-21 PROCEDURE — 93005 ELECTROCARDIOGRAM TRACING: CPT | Performed by: HOSPITALIST

## 2021-06-21 PROCEDURE — 97530 THERAPEUTIC ACTIVITIES: CPT

## 2021-06-21 PROCEDURE — 6360000002 HC RX W HCPCS: Performed by: NURSE PRACTITIONER

## 2021-06-21 PROCEDURE — 6370000000 HC RX 637 (ALT 250 FOR IP): Performed by: INTERNAL MEDICINE

## 2021-06-21 PROCEDURE — 6360000002 HC RX W HCPCS: Performed by: INTERNAL MEDICINE

## 2021-06-21 PROCEDURE — 93010 ELECTROCARDIOGRAM REPORT: CPT | Performed by: INTERNAL MEDICINE

## 2021-06-21 PROCEDURE — 97535 SELF CARE MNGMENT TRAINING: CPT

## 2021-06-21 PROCEDURE — 81001 URINALYSIS AUTO W/SCOPE: CPT

## 2021-06-21 PROCEDURE — 6370000000 HC RX 637 (ALT 250 FOR IP): Performed by: HOSPITALIST

## 2021-06-21 PROCEDURE — 1200000000 HC SEMI PRIVATE

## 2021-06-21 PROCEDURE — 36415 COLL VENOUS BLD VENIPUNCTURE: CPT

## 2021-06-21 PROCEDURE — 2500000003 HC RX 250 WO HCPCS: Performed by: NURSE PRACTITIONER

## 2021-06-21 PROCEDURE — 83036 HEMOGLOBIN GLYCOSYLATED A1C: CPT

## 2021-06-21 RX ORDER — LOSARTAN POTASSIUM 25 MG/1
50 TABLET ORAL DAILY
Status: DISCONTINUED | OUTPATIENT
Start: 2021-06-21 | End: 2021-06-23 | Stop reason: HOSPADM

## 2021-06-21 RX ORDER — HYDRALAZINE HYDROCHLORIDE 20 MG/ML
10 INJECTION INTRAMUSCULAR; INTRAVENOUS ONCE
Status: COMPLETED | OUTPATIENT
Start: 2021-06-21 | End: 2021-06-21

## 2021-06-21 RX ORDER — METOPROLOL TARTRATE 5 MG/5ML
5 INJECTION INTRAVENOUS ONCE
Status: COMPLETED | OUTPATIENT
Start: 2021-06-21 | End: 2021-06-21

## 2021-06-21 RX ORDER — NALOXONE HYDROCHLORIDE 0.4 MG/ML
0.4 INJECTION, SOLUTION INTRAMUSCULAR; INTRAVENOUS; SUBCUTANEOUS PRN
Status: DISCONTINUED | OUTPATIENT
Start: 2021-06-21 | End: 2021-06-23 | Stop reason: HOSPADM

## 2021-06-21 RX ADMIN — INSULIN LISPRO 20 UNITS: 100 INJECTION, SUSPENSION SUBCUTANEOUS at 11:22

## 2021-06-21 RX ADMIN — TIZANIDINE 4 MG: 4 TABLET ORAL at 10:47

## 2021-06-21 RX ADMIN — Medication 15 G: at 17:05

## 2021-06-21 RX ADMIN — INSULIN LISPRO 1 UNITS: 100 INJECTION, SOLUTION INTRAVENOUS; SUBCUTANEOUS at 13:18

## 2021-06-21 RX ADMIN — CHOLECALCIFEROL TAB 10 MCG (400 UNIT) 400 UNITS: 10 TAB at 10:47

## 2021-06-21 RX ADMIN — ENOXAPARIN SODIUM 40 MG: 40 INJECTION SUBCUTANEOUS at 10:37

## 2021-06-21 RX ADMIN — METOPROLOL TARTRATE 5 MG: 5 INJECTION INTRAVENOUS at 04:22

## 2021-06-21 RX ADMIN — NIFEDIPINE 30 MG: 30 TABLET, FILM COATED, EXTENDED RELEASE ORAL at 10:36

## 2021-06-21 RX ADMIN — SODIUM CHLORIDE, PRESERVATIVE FREE 10 ML: 5 INJECTION INTRAVENOUS at 21:23

## 2021-06-21 RX ADMIN — LOSARTAN POTASSIUM 50 MG: 25 TABLET, FILM COATED ORAL at 15:06

## 2021-06-21 RX ADMIN — DOXYCYCLINE HYCLATE 100 MG: 100 TABLET, COATED ORAL at 21:22

## 2021-06-21 RX ADMIN — HYDRALAZINE HYDROCHLORIDE 10 MG: 20 INJECTION INTRAMUSCULAR; INTRAVENOUS at 01:04

## 2021-06-21 RX ADMIN — ATORVASTATIN CALCIUM 10 MG: 10 TABLET, FILM COATED ORAL at 13:23

## 2021-06-21 RX ADMIN — POLYETHYLENE GLYCOL 3350 17 G: 17 POWDER, FOR SOLUTION ORAL at 04:22

## 2021-06-21 RX ADMIN — CEFTRIAXONE SODIUM 1000 MG: 1 INJECTION, POWDER, FOR SOLUTION INTRAMUSCULAR; INTRAVENOUS at 10:24

## 2021-06-21 RX ADMIN — DOXYCYCLINE HYCLATE 100 MG: 100 TABLET, COATED ORAL at 10:35

## 2021-06-21 RX ADMIN — TIZANIDINE 4 MG: 4 TABLET ORAL at 13:23

## 2021-06-21 RX ADMIN — LEVOTHYROXINE SODIUM 250 MCG: 0.12 TABLET ORAL at 05:15

## 2021-06-21 RX ADMIN — DOXAZOSIN 2 MG: 2 TABLET ORAL at 10:38

## 2021-06-21 RX ADMIN — CYCLOBENZAPRINE 10 MG: 10 TABLET, FILM COATED ORAL at 05:15

## 2021-06-21 RX ADMIN — HYDROCODONE BITARTRATE AND ACETAMINOPHEN 1 TABLET: 10; 325 TABLET ORAL at 21:22

## 2021-06-21 RX ADMIN — CYCLOBENZAPRINE 10 MG: 10 TABLET, FILM COATED ORAL at 10:34

## 2021-06-21 ASSESSMENT — PAIN SCALES - GENERAL
PAINLEVEL_OUTOF10: 3
PAINLEVEL_OUTOF10: 10
PAINLEVEL_OUTOF10: 10
PAINLEVEL_OUTOF10: 5

## 2021-06-21 NOTE — PROGRESS NOTES
Patient Diagnosis(es): The encounter diagnosis was Community acquired pneumonia of right lower lobe of lung.     has a past medical history of Anesthesia, Anxiety and depression, Arthritis, Cancer ([facility] Cellulitis, Colonization status, DDD (degenerative disc disease), lumbosacral, Diabetes mellitus ([address] Diabetic neuropathy ([facility] DJD (degenerative joint disease) of lumbar spine, Fall, Fibromyalgia, GERD (gastroesophageal reflux disease), Hearing loss, [place] (hard of hearing), Hypertension, Infection, Liver disease, MRSA (methicillin resistant staph aureus) culture positive, MRSA (methicillin resistant staph aureus) culture positive, Nausea & vomiting, Renal failure, Septic shock(785.52), Sleep apnea, Tailbone injury, and Thyroid disease. has a past surgical history that includes Tubal ligation; Abdomen surgery (2004); back surgery; other surgical history (4/5/2012); Thyroid surgery; Ankle surgery (5-2-2012); Ankle surgery (5/4/12); Ankle surgery (5/7/12); other surgical history (05/10/2012); other surgical history (05/14/12); other surgical history (7/16/2012); other surgical history (07/19/12); other surgical history (9/19/12); Total ankle arthroplasty (2/2012); other surgical history; debridement (12/04/12); Tonsillectomy; Endoscopy, colon, diagnostic; Colonoscopy; joint replacement; other surgical history (Right, 7/31/13); other surgical history (10-8-13); Ankle surgery (Right, 10/10/2013); and Ankle surgery (Right, 1/2/13).            Restrictions  Restrictions/Precautions  Restrictions/Precautions: Up as Tolerated, Fall Risk  Position Activity Restriction  Other position/activity restrictions: Internal pain pump    Subjective   General  Chart Reviewed: Yes, Orders, Progress Notes, History and Physical, Imaging, Labs  Patient assessed for rehabilitation services?: Yes  Additional Pertinent Hx: Chronic pain, HTN, Obesity, L ankle fx/surgery  Family / Caregiver Present: No  Referring Practitioner: Carley James Johnny Woods MD  Diagnosis: Hypoxia  Subjective  Subjective: Pt pleasant, agreeable to OT evaluation with encouragement. Pt reporting \"pain all over\". Patient Currently in Pain: Yes  Pain Assessment  Pain Assessment: 0-10  Vital Signs  Temp: 98 °F (36.7 °C)  Temp Source: Oral  Pulse: 75  Heart Rate Source: Monitor  Resp: 22  BP: (!) 210/98  Patient Position: Semi fowlers  Level of Consciousness: Alert (0)  MEWS Score: 4  Patient Currently in Pain: Yes  Oxygen Therapy  SpO2: 92 %  O2 Device: None (Room air)  Social/Functional History  Social/Functional History  Lives With: Family (Son)  Type of Home: Apartment  Home Layout: One level  Home Access: Stairs to enter without rails, Level entry  Entrance Stairs - Number of Steps: Level Entry, 1 GABRIELE through front door  Bathroom Shower/Tub: Walk-in shower, Shower chair with back  Bathroom Toilet: Standard  Bathroom Equipment: Grab bars in shower  Bathroom Accessibility: Wheelchair accessible, Walker accessible  Home Equipment: [employer], 4 wheeled walker  ADL Assistance: Needs assistance (Pt reports son with assist on \"bad days\")  Homemaking Assistance: Needs assistance (Son does most of the cooking, cleaning, and laundry)  Homemaking Responsibilities: No  Ambulation Assistance: Independent (Pt reports she uses the RW or W/C in the home)  Transfer Assistance: Needs assistance (Reports needing assistance with getting out of bed and into the car)  Active : No       Objective   Vision: Impaired  Hearing: Within functional limits    Orientation  Overall Orientation Status: Within Functional Limits  Observation/Palpation  Posture: Fair  Balance  Sitting Balance: Stand by assistance (sitting EOB)  Standing Balance: Minimal assistance (up to RW)  Functional Mobility  Functional Mobility Comments: Pt unable to tolerate functional distance this date;  Pt completed stand pivot transfer with RW and minx 1+ Modx1  Toilet Transfers  Toilet - Technique: Stand step  Equipment Used: Extra wide bedside commode  Toilet Transfer: Dependent/Total  Toilet Transfers Comments: min x1 + Mod x1 using RW  ADL  LE Dressing: Dependent/Total (donning brief)  Toileting: Dependent/Total (BM and voiding on bedside commode)  Additional Comments: Pt required increased assistance for ADLs due to pain, deficits in strength, ROM, and mobility. Tone RUE  RUE Tone: Normotonic  Tone LUE  LUE Tone: Normotonic  Coordination  Movements Are Fluid And Coordinated: Yes     Bed mobility  Supine to Sit: Moderate assistance  Sit to Supine: 2 Person assistance;Maximum assistance;Dependent/Total  Transfers  Sit to stand: 2 Person assistance;Dependent/Total;Moderate assistance;Minimal assistance  Stand to sit: Moderate assistance  Transfer Comments: Min x1 + Mod x1 up to RW     Cognition  Overall Cognitive Status: Exceptions  Arousal/Alertness: Appropriate responses to stimuli  Following Commands:  Follows multistep commands with increased time  Attention Span: Attends with cues to redirect  Memory: Decreased recall of recent events  Insights: Decreased awareness of deficits                 LUE AROM (degrees)  LUE AROM : WFL  Left Hand AROM (degrees)  Left Hand AROM: WFL  RUE AROM (degrees)  RUE AROM : WFL  Right Hand AROM (degrees)  Right Hand AROM: WFL                      Plan   Plan  Times per week: 3-5  Current Treatment Recommendations: Patient/Caregiver Education & Training, Strengthening, Endurance Training, Balance Training, Self-Care / ADL, Functional Mobility Training, Safety Education & Training, Pain Management, Equipment Evaluation, Education, & procurement      AM-PAC Score        AM-PAC Inpatient Daily Activity Raw Score: 11 (06/21/21 0951)  AM-PAC Inpatient ADL T-Scale Score : 29.04 (06/21/21 0951)  ADL Inpatient CMS 0-100% Score: 70.42 (06/21/21 0951)  ADL Inpatient CMS G-Code Modifier : CL (06/21/21 8935)    Goals  Short term goals  Time Frame for Short term goals: By 1 week (6/28/21)  Short term goal 1:

## 2021-06-21 NOTE — PROGRESS NOTES
Patient assessment complete and documented. VSS. A&Ox4 with confusion at times. Patient with complaints of pain 5 out of 10. Patient given scheduled pain medication (see MAR). Patient is currently in bed with no other complaints at this time. Bed is in lowest locked position with call light within reach.  Will cotninue to monitor situation

## 2021-06-21 NOTE — PROGRESS NOTES
Patient with complaints of mid sternal chest pain. Patient states its from the pills she took this evening. BP has been elevated all evening so I ordered a stat ekg per protocol. Ekg was normal sinus, and placed into patients chart.  Will continue to monitor situation

## 2021-06-21 NOTE — CARE COORDINATION
CASE MANAGEMENT INITIAL ASSESSMENT    Reviewed chart and completed assessment with: Patient and Daughter via phone re intermittent confusion noted     Explained Case Management role/services. Primary contact information: [facility] Decision Maker :   Primary Decision Maker: Waldo Guerrero - Child - [phone]    Secondary Decision Maker: Florencia Echevarria Child - [phone]        Can this person be reached and be able to respond quickly, such as within a few minutes or hours? Yes    Admit date/status:06/19/2021 Inpatient   Diagnosis: PNA  Is this a Readmission?:  No      Insurance:[employer] Medicare    Precert required for SNF: Yes       3 night stay required: No    Living arrangements, Adls, care needs, prior to admission: Patient will dc home to sister's house [address] [place] [address] [facility] no steps to enter     Transportation: current level will need transport      [facility] at home:  Walker_x_Cane__RTS__ BSC_x_Shower Chair__  02__ HHN__ CPAP__  BiPap__  Hospital Bed__ W/C___ Other___Daughter working on lift chair _______    [place] in the home and/or outpatient, prior to admission: reports prior skilled home care, unsure of provider    PT/OT recs:SNF     Hospital Exemption Notification (HEN): Needed     Barriers to discharge: pending clinical improvement/ precert     Plan/comments: Patient confused during conversation reporting \"sorry I have a lot of meds in me\" Okay to talk with dtr. Patient family hopeful to dc to sister house but will need to be min assist of one person currently would have to go to SNF referral sent to [facility]  per family request.PATRICE Edmondson     [facility] on chart for MD signature  [phone]: Accepted at [facility] pending precert will need rapid COVID. PATRICE Cole

## 2021-06-21 NOTE — PROGRESS NOTES
Physical Therapy    Facility/Department: [facility] C3 TELE/MED SURG/ONC  Initial Assessment    NAME: Sonia Mathis  : 1940  MRN: [id number]    Date of Service: 2021    Discharge Recommendations:  Subacute/Skilled Nursing Facility   PT Equipment Recommendations  Equipment Needed: No    Assessment   Body structures, Functions, Activity limitations: Decreased functional mobility ; Decreased ROM; Decreased ADL status; Decreased strength;Decreased endurance;Decreased balance;Decreased coordination;Decreased posture; Increased pain  Assessment: Pt was evaluated for PT s/p PNA hospitalization . Pt was in significant 10/10 pain which was agrivated by movement, limiting mobility and participation in PT. Pt noted that the pain started from low back and extended down into her legs L worse than R. Pt required mod A to perform a bedside commode transfer with a 2WW due to pain, forward/kyphotic posture, and instability. Due to the pt's current mobility status, PT recommends SNF for increased therapy to address gait, stairs, and general mobility. Treatment Diagnosis: decreased endurance, increased pain, decreased LE strength  Specific instructions for Next Treatment: encourage bed to chair transfer and seated LE strengthening  Prognosis: Fair  Decision Making: Medium Complexity  PT Education: Goals;PT Role;Plan of Care;Home Exercise Program;General Safety;Equipment;Gait Training;Disease Specific Education; Functional Mobility Training;Pressure Relief  Patient Education: Disease specific education- pt verbalized understanding of OOB activity and the importance of mobility  REQUIRES PT FOLLOW UP: Yes  Activity Tolerance  Activity Tolerance: Patient limited by fatigue;Patient limited by pain; Patient limited by endurance  Activity Tolerance: /79, HR 83, SpO2 94%       Patient Diagnosis(es): The encounter diagnosis was Community acquired pneumonia of right lower lobe of lung.     has a past medical history of Anesthesia, Anxiety and depression, Arthritis, Cancer ([facility] Cellulitis, Colonization status, DDD (degenerative disc disease), lumbosacral, Diabetes mellitus ([address] Diabetic neuropathy ([address] DJD (degenerative joint disease) of lumbar spine, Fall, Fibromyalgia, GERD (gastroesophageal reflux disease), Hearing loss, [place] (hard of hearing), Hypertension, Infection, Liver disease, MRSA (methicillin resistant staph aureus) culture positive, MRSA (methicillin resistant staph aureus) culture positive, Nausea & vomiting, Renal failure, Septic shock(785.52), Sleep apnea, Tailbone injury, and Thyroid disease. has a past surgical history that includes Tubal ligation; Abdomen surgery (2004); back surgery; other surgical history (4/5/2012); Thyroid surgery; Ankle surgery (5-2-2012); Ankle surgery (5/4/12); Ankle surgery (5/7/12); other surgical history (05/10/2012); other surgical history (05/14/12); other surgical history (7/16/2012); other surgical history (07/19/12); other surgical history (9/19/12); Total ankle arthroplasty (2/2012); other surgical history; debridement (12/04/12); Tonsillectomy; Endoscopy, colon, diagnostic; Colonoscopy; joint replacement; other surgical history (Right, 7/31/13); other surgical history (10-8-13); Ankle surgery (Right, 10/10/2013); and Ankle surgery (Right, 1/2/13).     Restrictions  Restrictions/Precautions  Restrictions/Precautions: Up as Tolerated, Fall Risk  Position Activity Restriction  Other position/activity restrictions: Internal pain pump  Vision/Hearing  Vision: Impaired  Vision Exceptions: Wears glasses at all times  Hearing: Within functional limits     Subjective  General  Chart Reviewed: Yes  Patient assessed for rehabilitation services?: Yes  Additional Pertinent Hx: chronic pain  Response To Previous Treatment: Not applicable  Family / Caregiver Present: No  Referring Practitioner: Sriram Ryder MD  Referral Date : 06/20/21  Diagnosis: PNA  Follows Commands: Within Functional Limits  General Comment  Comments: Pt was met lying in bed- RN cleared for therapy  Subjective  Subjective: Pt was agreeable to PT but in significant pain which limited pt's participation in PT. Pain Screening  Patient Currently in Pain: Yes  Pain Assessment  Pain Assessment: 0-10  Pain Level: 10  Vital Signs  Pulse: 83  Resp: 22  BP: (!) 203/79  BP Location: Left lower arm  Patient Currently in Pain: Yes  Oxygen Therapy  SpO2: 94 %  Pulse Oximeter Device Mode: Intermittent  O2 Device: None (Room air)    Orientation  Orientation  Overall Orientation Status: Within Normal Limits  Social/Functional History  Social/Functional History  Lives With: Family (Son)  Type of Home: Apartment  Home Layout: One level  Home Access: Stairs to enter without rails, Level entry  Entrance Stairs - Number of Steps: Level Entry, 1 GABRIELE through front door  Bathroom Shower/Tub: Walk-in shower, Shower chair with back  Bathroom Toilet: Standard  Bathroom Equipment: Grab bars in shower  Bathroom Accessibility: Wheelchair accessible, Walker accessible  Home Equipment: [employer], 4 wheeled walker  ADL Assistance: Needs assistance (Pt reports son with assist on \"bad days\")  Homemaking Assistance: Needs assistance (Son does most of the cooking, cleaning, and laundry)  Homemaking Responsibilities: No  Ambulation Assistance: Independent (Pt reports she uses the RW or W/C in the home)  Transfer Assistance: Needs assistance (Reports needing assistance with getting out of bed and into the car)  Active : No    Objective     Observation/Palpation  Posture: Fair    AROM RLE (degrees)  RLE General AROM: Pt's ROM is greatly limited by pain and was unable to be formally assessed  AROM LLE (degrees)  LLE General AROM: Pt's ROM is greatly limited by pain and was unable to be formally assessed  Strength RLE  Comment: Unable to formally assess due to pt's pain.  Demonstrates LE and truncal weakness due to posture and strength when performing stand pivot transfer  Strength LLE  Comment: Unable to formally assess due to pt's pain. Demonstrates LE and truncal weakness due to posture and strength when performing stand pivot transfer  Tone RLE  RLE Tone: Normotonic  Tone LLE  LLE Tone: Normotonic  Motor Control  Gross Motor?: WNL  Sensation  Overall Sensation Status: WNL  Bed mobility  Supine to Sit: Moderate assistance  Sit to Supine: 2 Person assistance;Maximum assistance;Dependent/Total  Comment: Pt limited to performing bed mobility with less assistance due to pain  Transfers  Sit to Stand: Moderate Assistance  Stand to sit: Moderate Assistance  Ambulation  Ambulation?: No (Pt was unable to ambulate due to pain)  Stairs/Curb  Stairs?: No (Pt was unable to navigate stairs due to pain)     Balance  Posture: Fair  Sitting - Static: Good  Sitting - Dynamic: Fair;+  Standing - Static: Fair  Standing - Dynamic: Poor    Plan   Plan  Times per week: 3-5 x /wk  Times per day: Daily  Plan weeks: 1 week (6/28/21)  Specific instructions for Next Treatment: encourage bed to chair transfer and seated LE strengthening  Current Treatment Recommendations: Strengthening, ROM, Balance Training, Functional Mobility Training, Transfer Training, Endurance Training, Gait Training, Stair training, Pain Management, Home Exercise Program  Safety Devices  Type of devices:  All fall risk precautions in place, Bed alarm in place, Gait belt, Patient at risk for falls, Left in bed, Nurse notified      AM-PAC Score  AM-PAC Inpatient Mobility Raw Score : 10 (06/21/21 1218)  AM-PAC Inpatient T-Scale Score : 32.29 (06/21/21 1218)  Mobility Inpatient CMS 0-100% Score: 76.75 (06/21/21 1218)  Mobility Inpatient CMS G-Code Modifier : CL (06/21/21 1218)    Goals  Short term goals  Time Frame for Short term goals: 1 week (6/21/21)  Short term goal 1: Pt will be able to ambulate 20ft with the LRAD requiring CGA in order to increase independence around the home  Short term goal 2: Pt will be able to transfer to/from chair, bed, toilet with LRA requiring CGA in order to increase independence  Short term goal 3: Pt will be able to ascend/descend 1 step/curb with LRA requiring CGA in order to gain access into her home  Short term goal 4: Pt will be independent in performing 10 reps of each LE exercise in order to improve strength  Patient Goals   Patient goals : \"To be able to go home\"       Therapy Time   Individual Concurrent Group Co-treatment   Time In 0815         Time Out 0846         Minutes 31         Timed Code Treatment Minutes: 31 Minutes       FELICIA Hernandez  If pt is unable to be seen after this session, please let this note serve as discharge summary. Please see case management note for discharge disposition. Thank you.

## 2021-06-21 NOTE — PROGRESS NOTES
Hospitalist Progress Note      PCP: Norm Salcedo    Date of Admission: 6/19/2021    Chief Complaint: Cough shortness of breath    Hospital Course:  This is a 41-year-old female admitted with community-acquired pneumonia, acute respiratory failure with hypoxia on admission now on room air on IV Rocephin and doxycycline    Subjective: Patient states feeling so-so on room air complains of her cough no sputum no chest pain no nausea vomiting decreased p.o. intake      Medications:  Reviewed    Infusion Medications    sodium chloride      dextrose       Scheduled Medications    losartan  50 mg Oral Daily    atorvastatin  10 mg Oral Daily    doxazosin  2 mg Oral Daily    insulin lispro protamine & lispro  20 Units Subcutaneous Daily with breakfast    vitamin D3  400 Units Oral Daily    HYDROcodone-acetaminophen  1 tablet Oral TID    tiZANidine  4 mg Oral 4x Daily    gabapentin  600 mg Oral TID    levothyroxine  250 mcg Oral Daily    NIFEdipine  30 mg Oral Daily    sodium chloride flush  10 mL Intravenous 2 times per day    enoxaparin  40 mg Subcutaneous Daily    cefTRIAXone (ROCEPHIN) IV  1,000 mg Intravenous Q24H    And    doxycycline hyclate  100 mg Oral 2 times per day    insulin lispro  0-6 Units Subcutaneous TID WC    insulin lispro  0-3 Units Subcutaneous Nightly     PRN Meds: ipratropium-albuterol, cyclobenzaprine, diclofenac sodium, sodium chloride flush, sodium chloride, ondansetron **OR** ondansetron, polyethylene glycol, acetaminophen **OR** acetaminophen, glucose, dextrose, glucagon (rDNA), dextrose      Intake/Output Summary (Last 24 hours) at 6/21/2021 1506  Last data filed at 6/21/2021 1432  Gross per 24 hour   Intake 1763.63 ml   Output --   Net 1763.63 ml       Physical Exam Performed:    BP (!) 99/57   Pulse 54   Temp 98.1 °F (36.7 °C) (Oral)   Resp 16   Ht 5' (1.524 m)   Wt 237 lb (107.5 kg)   SpO2 93%   BMI 46.29 kg/m²     General appearance: No apparent distress, appears stated age and cooperative. HEENT: Pupils equal, round, and reactive to light. Conjunctivae/corneas clear. Neck: Supple, with full range of motion. No jugular venous distention. Trachea midline. Respiratory:  Normal respiratory effort. Clear to auscultation, bilaterally without Rales/Wheezes/Rhonchi. Cardiovascular: Regular rate and rhythm with normal S1/S2 without murmurs, rubs or gallops. Abdomen: Soft, non-tender, non-distended with normal bowel sounds. Musculoskeletal: No clubbing, cyanosis or edema bilaterally. Full range of motion without deformity. Skin: Skin color, texture, turgor normal.  No rashes or lesions. Neurologic:  Neurovascularly intact without any focal sensory/motor deficits. Cranial nerves: II-XII intact, grossly non-focal.  Psychiatric: Alert and oriented, thought content appropriate, normal insight  Capillary Refill: Brisk,3 seconds, normal   Peripheral Pulses: +2 palpable, equal bilaterally       Labs:   Recent Labs     06/19/21 1956 06/20/21  0352 06/21/21  0550   WBC 20.8* 16.9* 9.7   HGB 11.0* 11.4* 11.9*   HCT 33.8* 35.0* 35.6*    203 222     Recent Labs     06/19/21 1956 06/20/21  0352 06/21/21  0549    143 144   K 4.0 3.9 3.8    107 108   CO2 29 29 27   BUN 15 13 11   CREATININE 0.7 0.6 <0.5*   CALCIUM 9.1 9.3 9.6     Recent Labs     06/19/21 1956   AST 22   ALT 11   BILITOT 0.7   ALKPHOS 76     No results for input(s): INR in the last 72 hours. Recent Labs     06/19/21 1956   TROPONINI <0.01       Urinalysis:      Lab Results   Component Value Date    NITRU Negative 06/21/2021    WBCUA 3-5 06/21/2021    BACTERIA 1+ 11/02/2013    RBCUA None seen 06/21/2021    BLOODU Negative 06/21/2021    SPECGRAV 1.010 06/21/2021    GLUCOSEU Negative 06/21/2021    GLUCOSEU NEGATIVE 06/06/2012       Radiology:  XR CHEST PORTABLE   Final Result   Limited evaluation. Suspected airspace disease right lower lung, atelectasis versus pneumonia   versus aspiration. Assessment/Plan:    Active Hospital Problems    Diagnosis     Morbid obesity with BMI of 45.0-49.9, adult ([facility] [E66.01, Z68.42]     PNA (pneumonia) [J18.9]     Hypothyroid [E03.9]     Chronic pain disorder [G89.4]     Hypertension [I10]     Diabetes mellitus ([facility] [E11.9]      Pneumonia  - suspect gram positive  - continue ceftriaxone, doxycycline  - no evidence of sepsis on admission  - stable on room air    DMII  - well controlled  - holding home oral meds  - SSI ordered    HTN  - poorly controlled  - continue home losartan, cardura, nifedipine    HLD  - continue home statin    Hypothyroidism  - continue home synthroid    Opioid dependence 2/2 chronic pain syndrome  - continue home pain regimen  - OARRS reviewed    Morbid obesity  With Body mass index is 37.51 kg/m². Complicating assessment and treatment. Placing patient at risk for multiple co-morbidities as well as early death and contributing to the patient's presentation. Counseled on weight loss. DVT Prophylaxis: Lovenox subcu  Diet: ADULT DIET; Regular; 4 carb choices (60 gm/meal)  Code Status: Full Code    PT/OT Eval Status: Consulted    Dispo - plan for SNF.  Pre-cert pending    Neena Schrader MD

## 2021-06-22 LAB
ANION GAP SERPL CALCULATED.3IONS-SCNC: 11 MMOL/L (ref 3–16)
BUN BLDV-MCNC: 10 MG/DL (ref 7–20)
CALCIUM SERPL-MCNC: 9.9 MG/DL (ref 8.3–10.6)
CHLORIDE BLD-SCNC: 106 MMOL/L (ref 99–110)
CO2: 25 MMOL/L (ref 21–32)
CREAT SERPL-MCNC: <0.5 MG/DL (ref 0.6–1.2)
GFR AFRICAN AMERICAN: >60
GFR NON-AFRICAN AMERICAN: >60
GLUCOSE BLD-MCNC: 100 MG/DL (ref 70–99)
GLUCOSE BLD-MCNC: 109 MG/DL (ref 70–99)
GLUCOSE BLD-MCNC: 117 MG/DL (ref 70–99)
GLUCOSE BLD-MCNC: 129 MG/DL (ref 70–99)
GLUCOSE BLD-MCNC: 98 MG/DL (ref 70–99)
HCT VFR BLD CALC: 37.1 % (ref 36–48)
HEMOGLOBIN: 12.4 G/DL (ref 12–16)
MCH RBC QN AUTO: 30.5 PG (ref 26–34)
MCHC RBC AUTO-ENTMCNC: 33.3 G/DL (ref 31–36)
MCV RBC AUTO: 91.8 FL (ref 80–100)
PDW BLD-RTO: 15.6 % (ref 12.4–15.4)
PERFORMED ON: ABNORMAL
PLATELET # BLD: 255 K/UL (ref 135–450)
PMV BLD AUTO: 8.1 FL (ref 5–10.5)
POTASSIUM SERPL-SCNC: 3.9 MMOL/L (ref 3.5–5.1)
RBC # BLD: 4.04 M/UL (ref 4–5.2)
SODIUM BLD-SCNC: 142 MMOL/L (ref 136–145)
WBC # BLD: 9.9 K/UL (ref 4–11)

## 2021-06-22 PROCEDURE — 97530 THERAPEUTIC ACTIVITIES: CPT

## 2021-06-22 PROCEDURE — 85027 COMPLETE CBC AUTOMATED: CPT

## 2021-06-22 PROCEDURE — 1200000000 HC SEMI PRIVATE

## 2021-06-22 PROCEDURE — 6360000002 HC RX W HCPCS: Performed by: INTERNAL MEDICINE

## 2021-06-22 PROCEDURE — 6370000000 HC RX 637 (ALT 250 FOR IP): Performed by: INTERNAL MEDICINE

## 2021-06-22 PROCEDURE — 2580000003 HC RX 258: Performed by: INTERNAL MEDICINE

## 2021-06-22 PROCEDURE — 97110 THERAPEUTIC EXERCISES: CPT

## 2021-06-22 PROCEDURE — 36415 COLL VENOUS BLD VENIPUNCTURE: CPT

## 2021-06-22 PROCEDURE — 80048 BASIC METABOLIC PNL TOTAL CA: CPT

## 2021-06-22 PROCEDURE — 97535 SELF CARE MNGMENT TRAINING: CPT

## 2021-06-22 RX ORDER — GABAPENTIN 400 MG/1
800 CAPSULE ORAL 3 TIMES DAILY
Status: DISCONTINUED | OUTPATIENT
Start: 2021-06-22 | End: 2021-06-23 | Stop reason: HOSPADM

## 2021-06-22 RX ORDER — TIZANIDINE 4 MG/1
4 TABLET ORAL EVERY 6 HOURS PRN
Status: DISCONTINUED | OUTPATIENT
Start: 2021-06-22 | End: 2021-06-22

## 2021-06-22 RX ORDER — TIZANIDINE 4 MG/1
4 TABLET ORAL 4 TIMES DAILY
Status: DISCONTINUED | OUTPATIENT
Start: 2021-06-22 | End: 2021-06-23 | Stop reason: HOSPADM

## 2021-06-22 RX ADMIN — HYDROCODONE BITARTRATE AND ACETAMINOPHEN 1 TABLET: 10; 325 TABLET ORAL at 21:27

## 2021-06-22 RX ADMIN — LOSARTAN POTASSIUM 50 MG: 25 TABLET, FILM COATED ORAL at 08:05

## 2021-06-22 RX ADMIN — CYCLOBENZAPRINE 10 MG: 10 TABLET, FILM COATED ORAL at 08:08

## 2021-06-22 RX ADMIN — HYDROCODONE BITARTRATE AND ACETAMINOPHEN 1 TABLET: 10; 325 TABLET ORAL at 11:36

## 2021-06-22 RX ADMIN — HYDROCODONE BITARTRATE AND ACETAMINOPHEN 1 TABLET: 10; 325 TABLET ORAL at 03:07

## 2021-06-22 RX ADMIN — DOXAZOSIN 2 MG: 2 TABLET ORAL at 08:05

## 2021-06-22 RX ADMIN — DOXYCYCLINE HYCLATE 100 MG: 100 TABLET, COATED ORAL at 08:05

## 2021-06-22 RX ADMIN — NIFEDIPINE 30 MG: 30 TABLET, FILM COATED, EXTENDED RELEASE ORAL at 08:05

## 2021-06-22 RX ADMIN — GABAPENTIN 800 MG: 400 CAPSULE ORAL at 21:27

## 2021-06-22 RX ADMIN — DOXYCYCLINE HYCLATE 100 MG: 100 TABLET, COATED ORAL at 21:27

## 2021-06-22 RX ADMIN — LEVOTHYROXINE SODIUM 250 MCG: 0.12 TABLET ORAL at 05:39

## 2021-06-22 RX ADMIN — TIZANIDINE 4 MG: 4 TABLET ORAL at 15:01

## 2021-06-22 RX ADMIN — CEFTRIAXONE SODIUM 1000 MG: 1 INJECTION, POWDER, FOR SOLUTION INTRAMUSCULAR; INTRAVENOUS at 08:13

## 2021-06-22 RX ADMIN — GABAPENTIN 800 MG: 400 CAPSULE ORAL at 15:01

## 2021-06-22 RX ADMIN — ATORVASTATIN CALCIUM 10 MG: 10 TABLET, FILM COATED ORAL at 08:05

## 2021-06-22 RX ADMIN — GABAPENTIN 600 MG: 300 CAPSULE ORAL at 05:40

## 2021-06-22 RX ADMIN — SODIUM CHLORIDE, PRESERVATIVE FREE 10 ML: 5 INJECTION INTRAVENOUS at 08:06

## 2021-06-22 RX ADMIN — TIZANIDINE 4 MG: 4 TABLET ORAL at 21:27

## 2021-06-22 RX ADMIN — SODIUM CHLORIDE, PRESERVATIVE FREE 10 ML: 5 INJECTION INTRAVENOUS at 21:28

## 2021-06-22 RX ADMIN — CHOLECALCIFEROL TAB 10 MCG (400 UNIT) 400 UNITS: 10 TAB at 08:05

## 2021-06-22 RX ADMIN — ENOXAPARIN SODIUM 40 MG: 40 INJECTION SUBCUTANEOUS at 08:05

## 2021-06-22 RX ADMIN — TIZANIDINE 4 MG: 4 TABLET ORAL at 18:00

## 2021-06-22 ASSESSMENT — PAIN SCALES - GENERAL
PAINLEVEL_OUTOF10: 7
PAINLEVEL_OUTOF10: 7
PAINLEVEL_OUTOF10: 10
PAINLEVEL_OUTOF10: 4
PAINLEVEL_OUTOF10: 5
PAINLEVEL_OUTOF10: 0

## 2021-06-22 ASSESSMENT — PAIN DESCRIPTION - PAIN TYPE: TYPE: ACUTE PAIN

## 2021-06-22 ASSESSMENT — PAIN DESCRIPTION - LOCATION: LOCATION: HIP;BACK

## 2021-06-22 ASSESSMENT — PAIN DESCRIPTION - ORIENTATION: ORIENTATION: LEFT

## 2021-06-22 NOTE — PROGRESS NOTES
PS Dr. Dionisio Johnson, \"Pt crying in pain and rating it a 10/10. Administered bolus from pain pump about an hour and a half ago. Also gave prn flexeril with no relief. Can she have a one time dose of something? Thank you. \"

## 2021-06-22 NOTE — PLAN OF CARE
Increase function to baseline.
Increase patients ADLs/functional status to baseline.
Problem: Falls - Risk of:  Goal: Will remain free from falls  Description: Will remain free from falls  6/20/2021 1100 by Jose Angel Pike RN  Outcome: Ongoing  6/20/2021 0244 by Ladan Casper RN  Outcome: Ongoing  Note: Pt will remain free from falls. Pt is a high fall risk. Call light within reach. Bed side table within reach. Wheels locked. Bed in lowest position. Bed check in place. Pt instructed to call out for assistance. Pt expressesed understanding & calls out appropritately. All care per orders. Will continue to monitor.         Problem: Pain:  Goal: Pain level will decrease  Description: Pain level will decrease  Outcome: Ongoing
Problem: Falls - Risk of:  Goal: Will remain free from falls  Description: Will remain free from falls  Outcome: Ongoing   Pt free from falls and injury. Call light within reach and alarm on and working. Ashley Reynoso RN
Problem: Falls - Risk of:  Goal: Will remain free from falls  Description: Will remain free from falls  Outcome: Ongoing  Note: Pt will remain free from falls. Pt is a high fall risk. Call light within reach. Bed side table within reach. Wheels locked. Bed in lowest position. Bed check in place. Pt instructed to call out for assistance. Pt expressesed understanding & calls out appropritately. All care per orders. Will continue to monitor.
Pt a/o, rates pain appropriately using 0-10 pain scale; pt calls out as needed for pain intervention; will continue to monitor and administer intervention as ordered and requested. Pt stable when writer left room. Chair alarm on. Room well lit and free of clutter with room door open. Bed side table and call light within reach. Pt verbalized understanding of calling for assistance.
Epidermal Sutures: 4-0 Ethilon
Size Of Lesion In Cm (Optional): 0
Notification Instructions: Patient will be notified of biopsy results. However, patient instructed to call the office if not contacted within 2 weeks.
Home Suture Removal Text: Patient was provided a home suture removal kit and will remove their sutures at home.  If they have any questions or difficulties they will call the office.
Patient Will Remove Sutures At Home?: No
Lab Facility: [id number]
Was A Bandage Applied: Yes
Suture Removal: 14 days
Billing Type: Third-Party Bill
Hemostasis: None
Detail Level: Detailed
Dressing: bandage
Anesthesia Type: 1% lidocaine with epinephrine
Wound Care: Petrolatum
Post-Care Instructions: I reviewed with the patient in detail post-care instructions. Patient is to keep the biopsy site dry overnight, and then apply bacitracin twice daily until healed. Patient may apply hydrogen peroxide soaks to remove any crusting.
Punch Size In Mm: 4
Anesthesia Volume In Cc (Will Not Render If 0): 0.5
Lab: [id number]
Biopsy Type: H and E
Consent: Written consent was obtained and risks were reviewed including but not limited to scarring, infection, bleeding, scabbing, incomplete removal, nerve damage and allergy to anesthesia.

## 2021-06-22 NOTE — PROGRESS NOTES
Occupational Therapy  Facility/Department: [facility] C3 TELE/MED SURG/ONC  Daily Treatment Note  NAME: Gordon Queen  : 1940  MRN: [id number]    Date of Service: 2021    Discharge Recommendations:  Subacute/Skilled Nursing Facility       Assessment   Performance deficits / Impairments: Decreased functional mobility ; Decreased balance;Decreased ADL status; Decreased endurance;Decreased strength    Assessment: Pt pleasant and agreeable to therapy but continues to be limited due to pain. Pt required increased time for all tasks but able to complete toilet transfer with min A. Pt required assist with pericare and RW management. Pt able to complete adls at a seated level and educated on BUE exercises for increased strength. Cont with POC. Prognosis: Fair  OT Education: OT Role;Transfer Training;Plan of Care;Precautions; ADL Adaptive Strategies  Patient Education: Disease specific: pain mangement, importance of OOB toileting  REQUIRES OT FOLLOW UP: Yes  Activity Tolerance  Activity Tolerance: Patient limited by fatigue;Patient limited by pain;Treatment limited secondary to medical complications (free text)  Activity Tolerance: /93 HR 92 O2 93%  Safety Devices  Safety Devices in place: Yes  Type of devices: All fall risk precautions in place;Call light within reach; Patient at risk for falls;Nurse notified;Gait belt; Chair alarm in place; Left in chair         Patient Diagnosis(es): The encounter diagnosis was Community acquired pneumonia of right lower lobe of lung.      has a past medical history of Anesthesia, Anxiety and depression, Arthritis, Cancer ([address] Cellulitis, Colonization status, DDD (degenerative disc disease), lumbosacral, Diabetes mellitus ([address] Diabetic neuropathy ([address] DJD (degenerative joint disease) of lumbar spine, Fall, Fibromyalgia, GERD (gastroesophageal reflux disease), Hearing loss, [place] (hard of hearing), Hypertension, Infection, Liver disease, MRSA (methicillin resistant staph aureus) culture positive, MRSA (methicillin resistant staph aureus) culture positive, Nausea & vomiting, Renal failure, Septic shock(785.52), Sleep apnea, Tailbone injury, and Thyroid disease. has a past surgical history that includes Tubal ligation; Abdomen surgery (2004); back surgery; other surgical history (4/5/2012); Thyroid surgery; Ankle surgery (5-2-2012); Ankle surgery (5/4/12); Ankle surgery (5/7/12); other surgical history (05/10/2012); other surgical history (05/14/12); other surgical history (7/16/2012); other surgical history (07/19/12); other surgical history (9/19/12); Total ankle arthroplasty (2/2012); other surgical history; debridement (12/04/12); Tonsillectomy; Endoscopy, colon, diagnostic; Colonoscopy; joint replacement; other surgical history (Right, 7/31/13); other surgical history (10-8-13); Ankle surgery (Right, 10/10/2013); and Ankle surgery (Right, 1/2/13).     Restrictions  Restrictions/Precautions  Restrictions/Precautions: Up as Tolerated, Fall Risk  Position Activity Restriction  Other position/activity restrictions: Internal pain pump     Subjective   General  Chart Reviewed: Yes, Orders, Progress Notes, History and Physical, Imaging, Labs  Patient assessed for rehabilitation services?: Yes  Additional Pertinent Hx: Chronic pain, HTN, Obesity, L ankle fx/surgery  Family / Caregiver Present: No  Referring Practitioner: Estelita Torres MD  Diagnosis: Hypoxia  Subjective  Subjective: Pt agreeable to therapy but limited due to pain  General Comment  Comments: RN approved therapy  Pain Assessment  Pain Assessment: 0-10  Pain Level: 10  Pain Type: Acute pain  Pain Location: Hip;Back  Pain Orientation: Left  Non-Pharmaceutical Pain Intervention(s): Repositioned  Vital Signs  Patient Currently in Pain: Yes     Orientation  Orientation  Overall Orientation Status: Within Functional Limits     Objective    ADL  Feeding: Independent  Grooming: Stand by assistance (seated in chair)  LE Dressing: Dependent/Total  Toileting: Dependent/Total (assist with pericare)        Balance  Sitting Balance: Stand by assistance  Standing Balance: Minimal assistance (RW)  Standing Balance  Time: ~30 seconds x2  Activity: transfers  Functional Mobility  Functional Mobility Comments: Pt limited due to pain stand step transfers only  Toilet Transfers  Toilet - Technique: Stand step  Equipment Used: Extra wide bedside commode  Toilet Transfer: Minimal assistance  Toilet Transfers Comments: assist with RW management  Bed mobility  Supine to Sit: Unable to assess  Sit to Supine: Unable to assess  Comment: up in chair at start/end of session  Transfers  Stand Step Transfers: Minimal assistance (RW)  Sit to stand: Moderate assistance  Stand to sit: Minimal assistance                       Cognition  Overall Cognitive Status: Exceptions  Arousal/Alertness: Appropriate responses to stimuli  Following Commands:  Follows multistep commands with increased time  Attention Span: Attends with cues to redirect  Memory: Decreased recall of recent events  Insights: Decreased awareness of deficits                    Type of ROM/Therapeutic Exercise  Type of ROM/Therapeutic Exercise: AROM  Comment: BUE seated  Exercises  Elbow Flexion: x15  Elbow Extension: x15  Supination: x15  Pronation: x15  Wrist Flexion: x15  Wrist Extension: x15  Grasp/Release: x15                    Plan   Plan  Times per week: 3-5  Current Treatment Recommendations: Patient/Caregiver Education & Training, Strengthening, Endurance Training, Balance Training, Self-Care / ADL, Functional Mobility Training, Safety Education & Training, Pain Management, Equipment Evaluation, Education, & procurement    AM-PAC Score        AM-PAC Inpatient Daily Activity Raw Score: 11 (06/22/21 0903)  AM-PAC Inpatient ADL T-Scale Score : 29.04 (06/22/21 0903)  ADL Inpatient CMS 0-100% Score: 70.42 (06/22/21 0903)  ADL Inpatient CMS G-Code Modifier : CL (06/22/21 4122)    Goals  Short term goals  Time Frame for Short term goals: By 1 week (6/28/21)  Short term goal 1: Pt will complete LB dressing with min A  Short term goal 2: Pt will complete toileting with min A- ongoing, max A  Short term goal 3: Pt will complete toilet transfers with SBA using RW  Short term goal 4: Pt will tolerate functional mobility to bathroom with CGA using RW in order to complete ADLs- ongoing  Patient Goals   Patient goals : To go home       Therapy Time   Individual Concurrent Group Co-treatment   Time In 0828         Time Out 0907         Minutes 39         Timed Code Treatment Minutes: 310 [facility], OTR/L    If pt is unable to be seen after this session, please let this note serve as discharge summary. Please see case management note for discharge disposition. Thank you.

## 2021-06-22 NOTE — PROGRESS NOTES
Pt a/o. VSS. Shift assessment complete and charted. Pt c/o back pain. Admin bolus doses from pain pump as requested and available. Admin prn flexeril as ordered and requested. Lungs clear throughout. Pt states her cough has resolved. Pt main complaint is back pain. Pt sitting up in chair. Spoke with Dr. Maikel Hadley from spine and he stated that he got consult but will not be able to see pt until later this evening or more likely tomorrow. He would like a call if she is transferred to S before he is able to see her, [phone]. Pt stable when writer left room. Chair alarm on. Room well lit and free of clutter with room door open. Bed side table and call light within reach. Pt verbalized understanding of calling for assistance.

## 2021-06-22 NOTE — CARE COORDINATION
Hospital day 3: Patient on C3 re Pneumonia followed by IM, consult placed to spine. Patient with therapy recs for SNF pre certifcation pending at this time, will need rapid COVID for [facility]. PATRICE Lee

## 2021-06-23 VITALS
SYSTOLIC BLOOD PRESSURE: 138 MMHG | HEIGHT: 60 IN | DIASTOLIC BLOOD PRESSURE: 77 MMHG | WEIGHT: 237 LBS | OXYGEN SATURATION: 91 % | HEART RATE: 88 BPM | TEMPERATURE: 97 F | BODY MASS INDEX: 46.53 KG/M2 | RESPIRATION RATE: 18 BRPM

## 2021-06-23 LAB
BLOOD CULTURE, ROUTINE: NORMAL
CULTURE, BLOOD 2: NORMAL
GLUCOSE BLD-MCNC: 82 MG/DL (ref 70–99)
GLUCOSE BLD-MCNC: 85 MG/DL (ref 70–99)
PERFORMED ON: NORMAL
PERFORMED ON: NORMAL
SARS-COV-2, NAAT: NOT DETECTED

## 2021-06-23 PROCEDURE — 87635 SARS-COV-2 COVID-19 AMP PRB: CPT

## 2021-06-23 PROCEDURE — 97530 THERAPEUTIC ACTIVITIES: CPT

## 2021-06-23 PROCEDURE — 99221 1ST HOSP IP/OBS SF/LOW 40: CPT | Performed by: PHYSICIAN ASSISTANT

## 2021-06-23 PROCEDURE — 6360000002 HC RX W HCPCS: Performed by: INTERNAL MEDICINE

## 2021-06-23 PROCEDURE — 6370000000 HC RX 637 (ALT 250 FOR IP): Performed by: INTERNAL MEDICINE

## 2021-06-23 PROCEDURE — 2580000003 HC RX 258: Performed by: INTERNAL MEDICINE

## 2021-06-23 PROCEDURE — 97110 THERAPEUTIC EXERCISES: CPT

## 2021-06-23 RX ORDER — AMOXICILLIN AND CLAVULANATE POTASSIUM 875; 125 MG/1; MG/1
1 TABLET, FILM COATED ORAL 2 TIMES DAILY
Qty: 4 TABLET | Refills: 0
Start: 2021-06-23 | End: 2021-06-25

## 2021-06-23 RX ORDER — HYDROCODONE BITARTRATE AND ACETAMINOPHEN 7.5; 325 MG/1; MG/1
1 TABLET ORAL 3 TIMES DAILY
Qty: 9 TABLET | Refills: 0 | Status: SHIPPED | OUTPATIENT
Start: 2021-06-23 | End: 2021-06-26

## 2021-06-23 RX ADMIN — LOSARTAN POTASSIUM 50 MG: 25 TABLET, FILM COATED ORAL at 11:36

## 2021-06-23 RX ADMIN — SODIUM CHLORIDE, PRESERVATIVE FREE 10 ML: 5 INJECTION INTRAVENOUS at 09:00

## 2021-06-23 RX ADMIN — HYDROCODONE BITARTRATE AND ACETAMINOPHEN 1 TABLET: 10; 325 TABLET ORAL at 04:34

## 2021-06-23 RX ADMIN — ENOXAPARIN SODIUM 40 MG: 40 INJECTION SUBCUTANEOUS at 11:37

## 2021-06-23 RX ADMIN — LEVOTHYROXINE SODIUM 250 MCG: 0.12 TABLET ORAL at 07:13

## 2021-06-23 RX ADMIN — DOXAZOSIN 2 MG: 2 TABLET ORAL at 10:45

## 2021-06-23 RX ADMIN — ATORVASTATIN CALCIUM 10 MG: 10 TABLET, FILM COATED ORAL at 11:35

## 2021-06-23 RX ADMIN — DOXYCYCLINE HYCLATE 100 MG: 100 TABLET, COATED ORAL at 10:45

## 2021-06-23 RX ADMIN — TIZANIDINE 4 MG: 4 TABLET ORAL at 10:46

## 2021-06-23 RX ADMIN — GABAPENTIN 800 MG: 400 CAPSULE ORAL at 10:45

## 2021-06-23 RX ADMIN — HYDROCODONE BITARTRATE AND ACETAMINOPHEN 1 TABLET: 10; 325 TABLET ORAL at 11:34

## 2021-06-23 RX ADMIN — CEFTRIAXONE SODIUM 1000 MG: 1 INJECTION, POWDER, FOR SOLUTION INTRAMUSCULAR; INTRAVENOUS at 08:43

## 2021-06-23 RX ADMIN — INSULIN LISPRO 20 UNITS: 100 INJECTION, SUSPENSION SUBCUTANEOUS at 08:45

## 2021-06-23 RX ADMIN — CHOLECALCIFEROL TAB 10 MCG (400 UNIT) 400 UNITS: 10 TAB at 11:37

## 2021-06-23 ASSESSMENT — PAIN SCALES - GENERAL
PAINLEVEL_OUTOF10: 6
PAINLEVEL_OUTOF10: 0
PAINLEVEL_OUTOF10: 6
PAINLEVEL_OUTOF10: 6

## 2021-06-23 ASSESSMENT — PAIN DESCRIPTION - FREQUENCY: FREQUENCY: CONTINUOUS

## 2021-06-23 ASSESSMENT — PAIN DESCRIPTION - PROGRESSION: CLINICAL_PROGRESSION: NOT CHANGED

## 2021-06-23 ASSESSMENT — PAIN DESCRIPTION - DESCRIPTORS: DESCRIPTORS: ACHING;CONSTANT

## 2021-06-23 ASSESSMENT — PAIN DESCRIPTION - LOCATION: LOCATION: HIP;BACK

## 2021-06-23 ASSESSMENT — PAIN SCALES - WONG BAKER: WONGBAKER_NUMERICALRESPONSE: 0

## 2021-06-23 ASSESSMENT — PAIN DESCRIPTION - ONSET: ONSET: ON-GOING

## 2021-06-23 ASSESSMENT — PAIN DESCRIPTION - ORIENTATION: ORIENTATION: LEFT

## 2021-06-23 ASSESSMENT — PAIN - FUNCTIONAL ASSESSMENT: PAIN_FUNCTIONAL_ASSESSMENT: PREVENTS OR INTERFERES WITH ALL ACTIVE AND SOME PASSIVE ACTIVITIES

## 2021-06-23 ASSESSMENT — PAIN DESCRIPTION - PAIN TYPE: TYPE: CHRONIC PAIN

## 2021-06-23 NOTE — DISCHARGE INSTR - COC
Continuity of Care Form    Patient Name: René Caodaism   :  1940  MRN:  [id number]    Admit date:  2021  Discharge date:  2021    Code Status Order: Full Code   Advance Directives:   Advance Care Flowsheet Documentation       Date/Time Healthcare Directive Type of Healthcare Directive Copy in [address] Agent's Name Healthcare Agent's Phone Number    21 [phone]  No, patient does not have an advance directive for healthcare treatment -- -- -- -- --            Admitting Physician:  Paula Kirkpatrick MD  PCP: Rossy Vallecillo    Discharging Nurse: [address]/Room#: 3334/4061-66  Discharging Unit Phone Number: [phone]    Emergency Contact:   Extended Emergency Contact Information  Primary Emergency Contact: [address] Phone: [phone]  Mobile Phone: [phone]  Relation: Child  Secondary Emergency Contact: [address] [facility] Phone: [phone]  Relation: Child    Past Surgical History:  Past Surgical History:   Procedure Laterality Date    ABDOMEN SURGERY      Lap Band    ANKLE SURGERY  2012    Incision, irrigation and debridement right ankle    ANKLE SURGERY  12    incision and drainage of right ankle    ANKLE SURGERY  12    incision and drainage right foot.     ANKLE SURGERY Right 10/10/2013    INCISION AND DEBRIDEMENT WITH REMOVAL OF PROSTHESIS RIGHT ANKLE WITH INSERTION OF ANTIBIOTIC SPACER    ANKLE SURGERY Right 13    RIGHT ANKLE REMOVAL OF ANTIBIOTIC BEADS, TIBIOTALOCALCANEAL FUSION , PLATELET RICH PLASMA INJECTION AND FEMORAL HEAD ALLOGRAFT WITH MINI C ARM BIOMET    BACK SURGERY      Cervical sx    COLONOSCOPY      DEBRIDEMENT  12    incision and debridement right ankle with application of Acell graft and block for pain control    ENDOSCOPY, COLON, DIAGNOSTIC      JOINT REPLACEMENT      Bilat knees    OTHER SURGICAL HISTORY  2012    INCISION AND DEBRIDEMENT RIGHT ANKLE WITH APPLICATION OF WOUND VAC    OTHER SURGICAL HISTORY  05/10/2012    i & d right ankle    OTHER SURGICAL HISTORY  05/14/12    incsion and debridement right ankle with placement of wound vac    OTHER SURGICAL HISTORY  7/16/2012    REPEAT DEBRIDEMENT WITH IRRIGATION AND DRAINAGE RIGHT ANKLE    OTHER SURGICAL HISTORY  07/19/12    incision and debridement right ankle    OTHER SURGICAL HISTORY  9/19/12    I&D right ankle    OTHER SURGICAL HISTORY      BILAT THUMB SURGERY    OTHER SURGICAL HISTORY Right 7/31/13    right hip injection    OTHER SURGICAL HISTORY  10-8-13    Incision and drainage right ankle    THYROID SURGERY      TONSILLECTOMY      TOTAL ANKLE ARTHROPLASTY  2/2012    TUBAL LIGATION         Immunization History:   Immunization History   Administered Date(s) Administered    COVID-19, Martinez Peter, PF, 30mcg/0.3mL 01/18/2021, 01/29/2021, 02/15/2021, 02/19/2021    Influenza, High Dose (Fluzone 65 yrs and older) 09/11/2020    Pneumococcal Conjugate 13-valent ([id number]) 06/24/2016    Pneumococcal Conjugate 7-valent (Prevnar7) 10/20/2012    Pneumococcal Polysaccharide ([id number]) 12/15/2010    Pneumococcal Vaccine 10/12/2010       Active Problems:  Patient Active Problem List   Diagnosis Code    Hypertension I10    Diabetes mellitus ([facility] E11.9    Chronic pain disorder G89.4    Superficial injury of ankle with infection S90.919A, L08.9    Acute encephalopathy G93.40    Rash R21    Wound infection after surgery T81.49XA    Cellulitis of ankle L03.119    Hypothyroid E03.9    Sacral fracture, closed (HCC) S32.10XA    Degeneration of lumbar or lumbosacral intervertebral disc M51.37    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    DDD (degenerative disc disease), lumbosacral M51.37    Severe right groin pain R10.31    Constipation K59.00    Thoracic or lumbosacral neuritis or radiculitis, unspecified [id number]    Lumbosacral spondylosis without myelopathy M47.817    Arthropathy of lumbar facet joint M47.816    Lumbosacral spondylosis without myelopathy M47.817    GERD (gastroesophageal reflux disease) K21.9    MADI (obstructive sleep apnea) G47.33    Anemia D64.9    PNA (pneumonia) J18.9    Neck pain M54.2    Osteomyelitis of ankle or foot, right, acute ([facility]) M86.171    Diarrhea R19.7    Stenosis, spinal, lumbar M48.061    Neurogenic claudication M48.062    right ankle fusion Z98.1    Closed nondisplaced fracture of second metatarsal bone of right foot S92.324A    Morbid obesity with BMI of 45.0-49.9, adult ([facility]) E66.01, Z68.42       Isolation/Infection:   Isolation            Contact          Patient Infection Status       Infection Onset Added Last Indicated Last Indicated By Review Planned Expiration Resolved Resolved By    [facility]  05/06/12 05/06/12 Elaine Pritchard RN                Nurse Assessment:  Last Vital Signs: BP (!) 155/74   Pulse 94   Temp 98.1 °F (36.7 °C) (Oral)   Resp 20   Ht 5' (1.524 m)   Wt 237 lb (107.5 kg)   SpO2 91%   BMI 46.29 kg/m²     Last documented pain score (0-10 scale): Pain Level: 6  Last Weight:   Wt Readings from Last 1 Encounters:   06/19/21 237 lb (107.5 kg)     Mental Status:  oriented    IV Access:  - None    Nursing Mobility/ADLs:  Walking   Assisted  Transfer  Assisted  Bathing  Assisted  Dressing  Assisted  Toileting  Assisted  Feeding  Assisted  Med Admin  Assisted  Med Delivery   whole    Wound Care Documentation and Therapy:        Elimination:  Continence:   · Bowel: Yes  · Bladder: Yes  Urinary Catheter: None   Colostomy/Ileostomy/Ileal Conduit: No       Date of Last BM: 6/23/2021    Intake/Output Summary (Last 24 hours) at 6/23/2021 0855  Last data filed at 6/23/2021 0433  Gross per 24 hour   Intake 730 ml   Output 0 ml   Net 730 ml     I/O last 3 completed shifts: In: 36 [P.O.:720; I.V.:10]  Out: 0     Safety Concerns:      At Risk for Falls    Impairments/Disabilities:      None    Nutrition Therapy:  Current Nutrition Therapy:   - Oral Diet:  Carb Control 3 carbs/meal (1500kcals/day)    Routes of Feeding: Oral  Liquids: Thin Liquids  Daily Fluid Restriction: no  Last Modified Barium Swallow with Video (Video Swallowing Test): not done    Treatments at the Time of Hospital Discharge:   Respiratory Treatments: ***  Oxygen Therapy:  is not on home oxygen therapy. Ventilator:    - No ventilator support    Rehab Therapies: Physical Therapy and Occupational Therapy  Weight Bearing Status/Restrictions: No weight bearing restirctions  Other Medical Equipment (for information only, NOT a DME order):  walker  Other Treatments: ***    Patient's personal belongings (please select all that are sent with patient):  Glasses, Dentures upper and lower    RN SIGNATURE:  Electronically signed by Raissa Venegas RN on 6/23/21 at 2:10 PM EDT    CASE MANAGEMENT/SOCIAL WORK SECTION    Inpatient Status Date: 06/19/2021    Readmission Risk Assessment Score:  Readmission Risk              Risk of Unplanned Readmission:  12         Discharging to Facility/ [address] [address]   [phone]     / signature: Electronically signed by PATRICE Wilkerson on 6/23/21 at 8:55 AM EDT    PHYSICIAN SECTION    Prognosis: Good    Condition at Discharge: Stable    Rehab Potential (if transferring to Rehab): Good    Recommended Labs or Other Treatments After Discharge: PT/OT, skilled nursing    Physician Certification: I certify the above information and transfer of Travon Russell  is necessary for the continuing treatment of the diagnosis listed and that she requires [place] for less 30 days.      Update Admission H&P: No change in H&P    PHYSICIAN SIGNATURE:  Electronically signed by Tena Mckeon MD on 6/23/21 at 12:19 PM EDT

## 2021-06-23 NOTE — DISCHARGE SUMMARY
Hospital Medicine Discharge Summary    Patient ID: Corinne Sack      Patient's PCP: Florine Lombard    Admit Date: 6/19/2021     Discharge Date: 6/23/2021      Admitting Physician: mEma Correa MD     Discharge Physician: Jayla Garcia MD     Discharge Diagnoses: Active Hospital Problems    Diagnosis     Morbid obesity with BMI of 45.0-49.9, adult [facility] [E66.01, Z68.42]     PNA (pneumonia) [J18.9]     Hypothyroid [E03.9]     Chronic pain disorder [G89.4]     Hypertension [I10]     Diabetes mellitus ([facility] [address] [E11.9]        The patient was seen and examined on day of discharge and this discharge summary is in conjunction with any daily progress note from day of discharge. Hospital Course:   [de-identified] y.o. female who presented to [facility] with above complaints  Patient presents to the ED today with complaints of cough, shortness of breath, generalized weakness.  Patient reports she has been sick for almost a week.  Has been having worsening generalized fatigue and weakness.  Reports productive cough with yellowish sputum, no hemoptysis.  This is associated with shortness of breath that is worse with exertion.  Denies any chest pain.  Denies subjective fevers chills or rigors.  Denies any nausea vomiting or abdominal pain.  She has chronic pain is on a pain pump and reports the pain pump has not been working well, resulting in her being in more pain.  She reports she had the Covid vaccine back in [place] denies any sick contacts or recent travel.     Pneumonia  - suspect gram positive  - started on ceftriaxone, doxycycline.  Discharged on augmentin to complete course  - no evidence of sepsis on admission  - stable on room air     DMII  - well controlled  - resume home regimen on discharge     HTN  - poorly controlled  - continue home losartan, cardura     HLD  - continue home statin     Hypothyroidism  - continue home synthroid     Opioid dependence 2/2 chronic pain syndrome  - continue home pain regimen  - OARRS reviewed     Spinal stenosis  - has close outpatient follow up  - spine surgery consulted  - recommend outpatient CT scans as ordered  - home pain regimen ordered      Morbid obesity  With Body mass index is 10.66 kg/m². Complicating assessment and treatment. Placing patient at risk for multiple co-morbidities as well as early death and contributing to the patient's presentation. Counseled on weight loss. Physical Exam Performed:     /77   Pulse 88   Temp 97 °F (36.1 °C) (Oral)   Resp 18   Ht 5' (1.524 m)   Wt 237 lb (107.5 kg)   SpO2 91%   BMI 46.29 kg/m²       General appearance: No apparent distress, appears stated age and cooperative. HEENT: Pupils equal, round, and reactive to light. Conjunctivae/corneas clear. Neck: Supple, with full range of motion. No jugular venous distention. Trachea midline. Respiratory:  Normal respiratory effort. Clear to auscultation, bilaterally without Rales/Wheezes/Rhonchi. Cardiovascular: Regular rate and rhythm with normal S1/S2 without murmurs, rubs or gallops. Abdomen: Soft, non-tender, non-distended with normal bowel sounds. Musculoskeletal: No clubbing, cyanosis or edema bilaterally. Full range of motion without deformity. Skin: Skin color, texture, turgor normal.  No rashes or lesions. Neurologic:  Neurovascularly intact without any focal sensory/motor deficits. Cranial nerves: II-XII intact, grossly non-focal.  Psychiatric: Alert and oriented, thought content appropriate, normal insight  Capillary Refill: Brisk,3 seconds, normal   Peripheral Pulses: +2 palpable, equal bilaterally       Labs:  For convenience and continuity at follow-up the following most recent labs are provided:      CBC:    Lab Results   Component Value Date    WBC 9.9 06/22/2021    HGB 12.4 06/22/2021    HCT 37.1 06/22/2021     06/22/2021       Renal:    Lab Results   Component Value Date     06/22/2021    K 3.9 capsule Take 600 mg by mouth 3 times daily. Historical Med             Time Spent on discharge is more than 30 minutes in the examination, evaluation, counseling and review of medications and discharge plan. Signed:    Regulo Lakhani MD   6/23/2021      Thank you [facility] for the opportunity to be involved in this patient's care. If you have any questions or concerns please feel free to contact me at [phone].

## 2021-06-23 NOTE — PROGRESS NOTES
06/23/21 1230   RT Protocol   Smoking Status 0   Surgical status 0   Xray 2  (last x ray taken on 6/19/2021)   Respiratory pattern 0   Mental Status 0   Breath sounds 0   Cough 1   Activity level 1   Oxygen Requirement 0   Indications for Bronchodilator Therapy None   Bronchodilator Assessment Score 3   Indications for Bronchial Hygiene None   Bronchial Hygiene Assessment Score 4   Indications for Volume Expansion None   Volume Expansion Assessment Score 3

## 2021-06-23 NOTE — CARE COORDINATION
Hospital day 4: Patient on C3 followed by IM- consult pending to spine. Patient with therapy recs for SNF precert pending at [facility], per admission liaison anticipates later this pm. Will need rapid COVID, RN aware and completed. PATRICE Berger  2428: Precert obtained pending spine consult. CASE MANAGEMENT DISCHARGE SUMMARY    Discharge to: [address] [place] [address] completed: [facility] Exemption Notification (HENS) completed: yes    IMM given: (date) 06/22/2021    New Durable Medical Equipment ordered/agency: Differ to SNF    Transportation:    Medical Transport explained to pt/family. Pt/family voice no agency preference. Agency used: [facility]    time: 2pm    Ambulance form completed: Yes    Confirmed discharge plan with: Patient and Daughter Kimmy Talley   Bedside     Facility/Agency, name:  [facility] JACKELIN/AVS faxed [phone]    Phone number for report to facility: [phone]    RN, name: Gab Gaytan RN     Note: Discharging nurse to complete JACKELIN, reconcile AVS, and place final copy with patient's discharge packet. RN to ensure that written prescriptions for  Level II medications are sent with patient to the facility as per protocol.   PATRICE Berger

## 2021-06-23 NOTE — CONSULTS
Consult: LUMBAR SPINE    Referring Provider:  No ref. provider found    CHIEF COMPLAINT:    Chief Complaint   Patient presents with    Cough     pt to ED with cough for a week or so. generalized fatigue. supposed to take water pill but has a hard time getting up and down so she doesnt take it    Fatigue       HISTORY OF PRESENT ILLNESS:       Ms. Margoth Yoder  is a pleasant [de-identified] y.o. female who presented to the ED with complaints of shortness of breath. Patient was seen and evaluated and was diagnosed with pneumonia. Patient states that she also has chronic back and leg pain and is under the care of pain management. Patient has a pain pump that she utilizes. She states that since she has been in the hospital she has had increased low back pain with pain into both legs with weightbearing. She states that recently her pain pump has not been working well but was seen by the representative of the pain pump company who readjusted the tube and she states that her pain did somewhat improved. She was recently seen and evaluated by [address] [place] [address] and she has an upcoming CT scan of the lumbar spine. Patient is comfortable at present time with sitting and comfortable lying. She denies any increased weakness, paresthesias, areas of decreased sensation, into either lower extremity. She denies any significant change in her bowel or bladder function and states that she has been having mild urinary incontinence but this is nothing new. Patient usually ambulates with a walker but states that ambulating with a walker has been difficult recently secondary to pain. Current/Past Treatment:   · Physical Therapy:  In the past but none recently  · Chiropractic: None  · Injection: Spinal injections in the past with varying degrees of benefit  · Medications: Pain pump, Neurontin, Zanaflex    Past Medical History:   Past Medical History:   Diagnosis Date    Anesthesia PONV    Anxiety and depression     Arthritis osteoporosis    Cancer ([facility]     Thyroid    Cellulitis 11/2012    Left Foot and Ankle    Colonization status 6/7/12    DNA probe negative for MRSA    DDD (degenerative disc disease), lumbosacral 9/29/2011    Diabetes mellitus ([address]     Diabetic neuropathy ([facility] [address]     DJD (degenerative joint disease) of lumbar spine 9/1/2011    Fall     Fibromyalgia     GERD (gastroesophageal reflux disease)     Hearing loss     [facility] (hard of hearing)     Hypertension     Infection     RIGHT ANKLE    Liver disease     fatty tissue on liver    MRSA (methicillin resistant staph aureus) culture positive 5/8/12; 5/2/12,9/19/12    Ankle    MRSA (methicillin resistant staph aureus) culture positive 10/8/13    ankle    Nausea & vomiting     PONV    Renal failure     acute renal failure on a previous admission    Septic shock(785.52)     Sleep apnea     does not use cpap    Tailbone injury FEB, 2013    Thyroid disease         Past Surgical History:     Past Surgical History:   Procedure Laterality Date    ABDOMEN SURGERY  2004    Lap Band    ANKLE SURGERY  5-2-2012    Incision, irrigation and debridement right ankle    ANKLE SURGERY  5/4/12    incision and drainage of right ankle    ANKLE SURGERY  5/7/12    incision and drainage right foot.     ANKLE SURGERY Right 10/10/2013    INCISION AND DEBRIDEMENT WITH REMOVAL OF PROSTHESIS RIGHT ANKLE WITH INSERTION OF ANTIBIOTIC SPACER    ANKLE SURGERY Right 1/2/13    RIGHT ANKLE REMOVAL OF ANTIBIOTIC BEADS, TIBIOTALOCALCANEAL FUSION , PLATELET RICH PLASMA INJECTION AND FEMORAL HEAD ALLOGRAFT WITH MINI C ARM BIOMET    BACK SURGERY      Cervical sx    COLONOSCOPY      DEBRIDEMENT  12/04/12    incision and debridement right ankle with application of Acell graft and block for pain control    ENDOSCOPY, COLON, DIAGNOSTIC      JOINT REPLACEMENT      Bilat knees    OTHER SURGICAL HISTORY  4/5/2012    INCISION AND DEBRIDEMENT RIGHT ANKLE WITH APPLICATION OF WOUND VAC    OTHER SURGICAL HISTORY  05/10/2012    i & d right ankle    OTHER SURGICAL HISTORY  05/14/12    incsion and debridement right ankle with placement of wound vac    OTHER SURGICAL HISTORY  7/16/2012    REPEAT DEBRIDEMENT WITH IRRIGATION AND DRAINAGE RIGHT ANKLE    OTHER SURGICAL HISTORY  07/19/12    incision and debridement right ankle    OTHER SURGICAL HISTORY  9/19/12    I&D right ankle    OTHER SURGICAL HISTORY      BILAT THUMB SURGERY    OTHER SURGICAL HISTORY Right 7/31/13    right hip injection    OTHER SURGICAL HISTORY  10-8-13    Incision and drainage right ankle    THYROID SURGERY      TONSILLECTOMY      TOTAL ANKLE ARTHROPLASTY  2/2012    TUBAL LIGATION         Current Medications:     Current Facility-Administered Medications:     tiZANidine (ZANAFLEX) tablet 4 mg, 4 mg, Oral, 4x Daily, Annabelle Chaves MD, 4 mg at 06/23/21 1046    gabapentin (NEURONTIN) capsule 800 mg, 800 mg, Oral, TID, Annabelle Chaves MD, 800 mg at 06/23/21 1045    losartan (COZAAR) tablet 50 mg, 50 mg, Oral, Daily, Annabelle Chaves MD, 50 mg at 06/23/21 1136    naloxone [facility]) injection 0.4 mg, 0.4 mg, Intravenous, PRN, Annabelle Chaves MD    atorvastatin (LIPITOR) tablet 10 mg, 10 mg, Oral, Daily, Angela Segal MD, 10 mg at 06/23/21 1135    doxazosin (CARDURA) tablet 2 mg, 2 mg, Oral, Daily, Angela Segal MD, 2 mg at 06/23/21 1045    insulin lispro protamine & lispro (HUMALOG MIX) (75-25) 100 UNIT per ML injection vial SUSP 20 Units, 20 Units, Subcutaneous, Daily with breakfast, Angela Segal MD, 20 Units at 06/23/21 0845    vitamin D3 (CHOLECALCIFEROL) tablet 400 Units, 400 Units, Oral, Daily, Angela Segal MD, 400 Units at 06/23/21 1137    HYDROcodone-acetaminophen (NORCO)  MG per tablet 1 tablet, 1 tablet, Oral, TID, Angela Segal MD, 1 tablet at 06/23/21 1134    ipratropium-albuterol (DUONEB) nebulizer solution 1 ampule, 1 ampule, Inhalation, Q4H PRN, MD Francine Wallis diclofenac sodium (VOLTAREN) 1 % gel 2 g, 2 g, Topical, 4x Daily PRN, Andi Hayes MD    levothyroxine (SYNTHROID) tablet 250 mcg, 250 mcg, Oral, Daily, Andi Hayes MD, 250 mcg at 06/23/21 7887    sodium chloride flush 0.9 % injection 10 mL, 10 mL, Intravenous, 2 times per day, Andi Hayes MD, 10 mL at 06/22/21 2128    sodium chloride flush 0.9 % injection 10 mL, 10 mL, Intravenous, PRN, Andi Hayes MD    0.9 % sodium chloride infusion, 25 mL, Intravenous, PRN, Andi Hayes MD    enoxaparin (LOVENOX) injection 40 mg, 40 mg, Subcutaneous, Daily, Andi Hayes MD, 40 mg at 06/23/21 1137    ondansetron (ZOFRAN-ODT) disintegrating tablet 4 mg, 4 mg, Oral, Q8H PRN **OR** ondansetron (ZOFRAN) injection 4 mg, 4 mg, Intravenous, Q6H PRN, Andi Hayes MD    polyethylene glycol (GLYCOLAX) packet 17 g, 17 g, Oral, Daily PRN, Andi Hayes MD, 17 g at 06/21/21 0422    acetaminophen (TYLENOL) tablet 650 mg, 650 mg, Oral, Q6H PRN **OR** acetaminophen (TYLENOL) suppository 650 mg, 650 mg, Rectal, Q6H PRN, Andi Hayes MD    cefTRIAXone (ROCEPHIN) 1000 mg IVPB in 50 mL D5W minibag, 1,000 mg, Intravenous, Q24H, Stopped at 06/23/21 1047 **AND** doxycycline hyclate (VIBRA-TABS) tablet 100 mg, 100 mg, Oral, 2 times per day, Andi Hayes MD, 100 mg at 06/23/21 1045    glucose (GLUTOSE) 40 % oral gel 15 g, 15 g, Oral, PRN, nAdi Hayes MD, 15 g at 06/21/21 1705    dextrose 50 % IV solution, 12.5 g, Intravenous, PRN, Andi Hayes MD    glucagon (rDNA) injection 1 mg, 1 mg, Intramuscular, PRN, Andi Hayes MD    dextrose 5 % solution, 100 mL/hr, Intravenous, PRN, Andi Hayes MD    insulin lispro (HUMALOG) injection vial 0-6 Units, 0-6 Units, Subcutaneous, TID WC, Andi Hayes MD, 1 Units at 06/21/21 1318    insulin lispro (HUMALOG) injection vial 0-3 Units, 0-3 Units, Subcutaneous, Nightly, Tho LÓPEZ Keily Johnson MD    Allergies:  Morphine, Lyrica [pregabalin], Tape [adhesive tape], Piperacillin sod-tazobactam so, Sulfa antibiotics, and Zyvox [linezolid]    Social History:    reports that she has never smoked. She has never used smokeless tobacco. She reports that she does not drink alcohol and does not use drugs. Family History:   Family History   Problem Relation Age of Onset    High Blood Pressure Mother     Cancer Father         lung    Diabetes Sister     Arthritis Sister     Diabetes Brother     Heart Disease Brother        REVIEW OF SYSTEMS: Full ROS noted & scanned   CONSTITUTIONAL: Denies unexplained weight loss, fevers, chills or fatigue  NEUROLOGICAL: Denies unsteady gait or progressive weakness  MUSCULOSKELETAL: Denies joint swelling or redness  PSYCHOLOGICAL: Denies anxiety, depression   SKIN: Denies skin changes, delayed healing, rash, itching   HEMATOLOGIC: Denies easy bleeding or bruising  ENDOCRINE: Denies excessive thirst, urination, heat/cold  RESPIRATORY: Denies current dyspnea, cough  GI: Denies nausea, vomiting, diarrhea   : Denies bowel or bladder issues      PHYSICAL EXAM:    Vitals: Blood pressure (!) 155/74, pulse 94, temperature 98.1 °F (36.7 °C), temperature source Oral, resp. rate 20, height 5' (1.524 m), weight 237 lb (107.5 kg), SpO2 91 %. GENERAL EXAM:  · General Apparence: Patient is adequately groomed with no evidence of malnutrition. · Orientation: The patient is oriented to time, place and person. · Mood & Affect:The patient's mood and affect are appropriate. · Vascular: Examination reveals no swelling tenderness in upper or lower extremities. Good capillary refill.   · Lymphatic: The lymphatic examination bilaterally reveals all areas to be without enlargement or induration  · Sensation: Sensation is intact bilaterally with no significant deficit  Coordination/Balance: Patient examined in chair  LUMBAR/SACRAL EXAMINATION:  · Inspection: Local inspection shows no step-off or bruising. Lumbar alignment is normal.  Sagittal and Coronal balance is neutral.      · Palpation:   Patient has minimal palpable pain over the lumbar spine but does complain of pain with movement of her lower extremities. · Range of Motion: Lumbar flexion, extension and rotation are severely limited due to pain. · Strength:   Strength testing is 4/5 in all muscle groups tested. · Special Tests:   Straight leg raise and crossed SLR negative. Leg length and pelvis level. · Skin: There are no rashes, ulcerations or lesions. · Reflexes: Reflexes are symmetrically 2+ at the patellar and ankle tendons. Clonus absent bilaterally at the feet. · Gait & station: Patient examined in bedside chair    · Additional Examinations:   · RIGHT LOWER EXTREMITY: Inspection/examination of the right lower extremity reveals incision over the anterior aspect of the right ankle mixed dorsiflexion and plantarflexion difficult due to ankle fusion. · LEFT LOWER EXTREMITY:  Inspection/examination of the left lower extremity does not show any tenderness, deformity or injury. Range of motion is unremarkable. There is no gross instability. There are no rashes, ulcerations or lesions. Strength and tone are normal.    Diagnostic Testing:    None performed    Impression:   History of chronic low back pain and bilateral leg pain  History of spinal stenosis with neurogenic claudication    1. Community acquired pneumonia of right lower lobe of lung    2. Neurogenic claudication        Plan:      We discussed the diagnosis and treatment options. Patient is to continue with her outpatient CT scan of the lumbar spine as scheduled and she will be transferred to a SNF later today for their continue evaluation and care. Her care will be followed by her neurosurgeon at [address] [place] [address]. Patient examined and note dictated by Higinio Knight PA-C. Patient's care and disposition was discussed with Dr. Stacey Callahan.

## 2021-06-23 NOTE — PROGRESS NOTES
Physical Therapy  Facility/Department: [facility] C3 TELE/MED SURG/ONC  Daily Treatment Note  NAME: Sonia Mathis  : 1940  MRN: [id number]    Date of Service: 2021    Discharge Recommendations:  Subacute/Skilled Nursing Facility   PT Equipment Recommendations  Equipment Needed: No (defer to facility)    Assessment   Body structures, Functions, Activity limitations: Decreased functional mobility ; Decreased ROM; Decreased ADL status; Decreased strength;Decreased endurance;Decreased balance;Decreased coordination;Decreased posture; Increased pain  Assessment: Pt demonstrating improvements since last session. Pt requiring only min A for sit <> stand transfers this date at [address] [place] [address]. Pt progressing to ambulation with CGA to min A at  for 90 ft this date. Pt unsafe to attempt stairs this date 2/2 poor tolerance. Due to the pt's current mobility status, PT continues to recommend SNF for increased therapy to address gait, stairs, and general mobility. PT Education: Goals;PT Role;Plan of Care;General Safety;Gait Training;Disease Specific Education; Functional Mobility Training;Transfer Training  Patient Education: Pt verbalized understanding. REQUIRES PT FOLLOW UP: Yes  Activity Tolerance  Activity Tolerance: Patient Tolerated treatment well  Activity Tolerance: post ambulation: pt slightly SOB but spO2 on RA 96% and HR 84 bpm.     Patient Diagnosis(es): The primary encounter diagnosis was Community acquired pneumonia of right lower lobe of lung. A diagnosis of Neurogenic claudication was also pertinent to this visit.      has a past medical history of Anesthesia, Anxiety and depression, Arthritis, Cancer ([address] Cellulitis, Colonization status, DDD (degenerative disc disease), lumbosacral, Diabetes mellitus ([address] Diabetic neuropathy ([address] DJD (degenerative joint disease) of lumbar spine, Fall, Fibromyalgia, GERD (gastroesophageal reflux disease), Hearing loss, [place] (hard of hearing), Hypertension, Infection, Liver disease, MRSA (methicillin resistant staph aureus) culture positive, MRSA (methicillin resistant staph aureus) culture positive, Nausea & vomiting, Renal failure, Septic shock(785.52), Sleep apnea, Tailbone injury, and Thyroid disease. has a past surgical history that includes Tubal ligation; Abdomen surgery (2004); back surgery; other surgical history (4/5/2012); Thyroid surgery; Ankle surgery (5-2-2012); Ankle surgery (5/4/12); Ankle surgery (5/7/12); other surgical history (05/10/2012); other surgical history (05/14/12); other surgical history (7/16/2012); other surgical history (07/19/12); other surgical history (9/19/12); Total ankle arthroplasty (2/2012); other surgical history; debridement (12/04/12); Tonsillectomy; Endoscopy, colon, diagnostic; Colonoscopy; joint replacement; other surgical history (Right, 7/31/13); other surgical history (10-8-13); Ankle surgery (Right, 10/10/2013); and Ankle surgery (Right, 1/2/13). Restrictions  Restrictions/Precautions  Restrictions/Precautions: Up as Tolerated, Fall Risk  Position Activity Restriction  Other position/activity restrictions: Internal pain pump, IV  Subjective   General  Family / Caregiver Present: Yes  Subjective  Subjective: Pt was agreeable to PT. Pain Screening  Patient Currently in Pain: Denies  Vital Signs  Patient Currently in Pain: Denies       Orientation  Orientation  Overall Orientation Status: Within Normal Limits  Cognition   Cognition  Overall Cognitive Status: Exceptions  Arousal/Alertness: Appropriate responses to stimuli  Following Commands:  Follows multistep commands with increased time  Attention Span: Attends with cues to redirect  Insights: Fully aware of deficits  Objective   Bed mobility  Supine to Sit: Unable to assess  Sit to Supine: Unable to assess  Comment: up in chair at start/end of session  Transfers  Sit to Stand: Minimal Assistance (at [address] [place] [address])  Stand to sit: Minimal Assistance  Ambulation  Ambulation?: Yes  Ambulation 1  Surface: level tile  Device: Rolling Walker  Assistance: Contact guard assistance;Minimal assistance  Quality of Gait: partial step through pattern  Gait Deviations: Shuffles; Slow Rosy;Decreased step height;Decreased step length  Distance: 90 ft  Stairs/Curb  Stairs?: No     Balance  Sitting - Static: Good  Sitting - Dynamic: Good;-  Standing - Static: Fair;+  Standing - Dynamic: Fair;+  Comments: Pt able to stand at sink with unilateral UE support and perform teethbrushing with CGA. AM-PAC Score  AM-PAC Inpatient Mobility Raw Score : 14 (06/23/21 1324)  AM-PAC Inpatient T-Scale Score : 38.1 (06/23/21 1324)  Mobility Inpatient CMS 0-100% Score: 61.29 (06/23/21 1324)  Mobility Inpatient CMS G-Code Modifier : CL (06/23/21 1324)          Goals  Short term goals  Time Frame for Short term goals: 1 week (6/21/21)  Short term goal 1: Pt will be able to ambulate 20ft with the LRAD requiring CGA in order to increase independence around the home - 6/23: partially met ([place] to [place] [facility] [place])  Short term goal 2: Pt will be able to transfer to/from chair, bed, toilet with LRA requiring CGA in order to increase independence - 6/23 not met (min A)  Short term goal 3: Pt will be able to ascend/descend 1 step/curb with LRA requiring CGA in order to gain access into her home - 6/23 not met (unable to safely manage steps  yet)  Short term goal 4: Pt will be independent in performing 10 reps of each LE exercise in order to improve strength - 6/23: not met, primarily focus on transfers/gait this date  Patient Goals   Patient goals :  \"To be able to go home\"    Plan    Plan  Times per week: 3-5 x /wk  Times per day: Daily  Plan weeks: 1 week (6/28/21)  Specific instructions for Next Treatment: encourage bed to chair transfer and seated LE strengthening  Current Treatment Recommendations: Strengthening, ROM, Balance Training, Functional Mobility Training, Transfer Training, Endurance Training, Gait Training, Stair training, Pain Management, Home Exercise Program  Safety Devices  Type of devices:  All fall risk precautions in place, Gait belt, Patient at risk for falls, Nurse notified, Call light within reach, Left in chair (family present)  Restraints  Initially in place: No     Therapy Time   Individual Concurrent Group Co-treatment   Time In 1250         Time Out 1314         Minutes 24         Timed Code Treatment Minutes: 24 Minutes       Marlney Spence, PT

## 2021-07-21 NOTE — PROGRESS NOTES
Physician Progress Note      PATIENT:               Celso Tomlin  CSN #:                  [id number]  :                       1940  ADMIT DATE:       2021 7:41 PM  [address] [place] DATE:        2021 2:31 PM  RESPONDING  PROVIDER #:        Camila Vaz MD          QUERY TEXT:    Patient admitted with PNA. Noted documentation of acute respiratory failure in   PN  and . In order to support the diagnosis of acute respiratory   failure, please include additional clinical indicators in your documentation. Or please document if the diagnosis of acute respiratory failure has been   ruled out after further study. The medical record reflects the following:  Risk Factors: PNA    Clinical Indicators: on arrival RR 18-22, per ED provider- \"Respirations   unlabored. CTAB. Good air exchange. No wheezes, rales, or rhonchi. Speaking   comfortably in full sentences. \" Per H&P- \"Chest imaging reveals infiltrates in   the right lower lobe. Blanchie Bevels Hypoxic on [email or web address] on room air\" Per PN  and   -\"acute respiratory failure with hypoxia on admission now on room air\". No   blood gases done. per nursing flow sheet- RA sats during admission=91-96%    Treatment: monitoring, ongoing support, abx for PNA    Thank you,  Loan Car RN, [email or web address]    Acute Respiratory Failure Clinical Indicators per [facility] Training Guide and   Quick Reference Guide:  pO2 < 60 mmHg or SpO2 (pulse oximetry) < 91% breathing room air  pCO2 > 50 and pH < 7.35  P/F ratio (pO2 / FIO2) < 300  pO2 decrease or pCO2 increase by 10 mmHg from baseline (if known)  Supplemental oxygen of 40% or more  Presence of respiratory distress, tachypnea, dyspnea, shortness of breath,   wheezing  Unable to speak in complete sentences  Use of accessory muscles to breathe  Extreme anxiety and feeling of impending doom  Tripod position  Confusion/altered mental status/obtunded  Options provided:  -- Acute Respiratory Failure as evidenced by,

## 2022-04-15 NOTE — ED NOTES
330 @ [phone]
[id number] placed on pur-wik for voiding d/t c/o of weakness with ambulation and needing a walker      Darrick Marrero RN  06/19/21 6104
Bedside report to [facility]      Arelis Fuentes RN  06/19/21 9494
ambulatory

## 2024-02-14 ENCOUNTER — APPOINTMENT (OUTPATIENT)
Dept: GENERAL RADIOLOGY | Facility: HOSPITAL | Age: 84
End: 2024-02-14
Payer: MEDICARE

## 2024-02-14 ENCOUNTER — HOSPITAL ENCOUNTER (EMERGENCY)
Facility: HOSPITAL | Age: 84
Discharge: HOME | End: 2024-02-14
Attending: EMERGENCY MEDICINE | Admitting: EMERGENCY MEDICINE
Payer: MEDICARE

## 2024-02-14 VITALS
TEMPERATURE: 97.8 F | OXYGEN SATURATION: 91 % | WEIGHT: 242 LBS | SYSTOLIC BLOOD PRESSURE: 148 MMHG | DIASTOLIC BLOOD PRESSURE: 66 MMHG | BODY MASS INDEX: 47.51 KG/M2 | HEART RATE: 57 BPM | RESPIRATION RATE: 18 BRPM | HEIGHT: 60 IN

## 2024-02-14 DIAGNOSIS — J42 CHRONIC BRONCHITIS, UNSPECIFIED CHRONIC BRONCHITIS TYPE: Primary | ICD-10-CM

## 2024-02-14 LAB
ALBUMIN SERPL-MCNC: 4.2 G/DL (ref 3.5–5.2)
ALBUMIN/GLOB SERPL: 1.7 G/DL
ALP SERPL-CCNC: 92 U/L (ref 39–117)
ALT SERPL W P-5'-P-CCNC: 8 U/L (ref 1–33)
ANION GAP SERPL CALCULATED.3IONS-SCNC: 11.1 MMOL/L (ref 5–15)
AST SERPL-CCNC: 23 U/L (ref 1–32)
BASOPHILS # BLD AUTO: 0.03 10*3/MM3 (ref 0–0.2)
BASOPHILS NFR BLD AUTO: 0.4 % (ref 0–1.5)
BILIRUB SERPL-MCNC: 0.2 MG/DL (ref 0–1.2)
BUN SERPL-MCNC: 25 MG/DL (ref 8–23)
BUN/CREAT SERPL: 17.7 (ref 7–25)
CALCIUM SPEC-SCNC: 8.8 MG/DL (ref 8.6–10.5)
CHLORIDE SERPL-SCNC: 103 MMOL/L (ref 98–107)
CO2 SERPL-SCNC: 26.9 MMOL/L (ref 22–29)
CREAT SERPL-MCNC: 1.41 MG/DL (ref 0.57–1)
DEPRECATED RDW RBC AUTO: 49.5 FL (ref 37–54)
EGFRCR SERPLBLD CKD-EPI 2021: 37.3 ML/MIN/1.73
EOSINOPHIL # BLD AUTO: 0.53 10*3/MM3 (ref 0–0.4)
EOSINOPHIL NFR BLD AUTO: 7 % (ref 0.3–6.2)
ERYTHROCYTE [DISTWIDTH] IN BLOOD BY AUTOMATED COUNT: 13.9 % (ref 12.3–15.4)
FLUAV SUBTYP SPEC NAA+PROBE: NOT DETECTED
FLUBV RNA ISLT QL NAA+PROBE: NOT DETECTED
GLOBULIN UR ELPH-MCNC: 2.5 GM/DL
GLUCOSE SERPL-MCNC: 105 MG/DL (ref 65–99)
HCT VFR BLD AUTO: 34 % (ref 34–46.6)
HGB BLD-MCNC: 10.9 G/DL (ref 12–15.9)
HOLD SPECIMEN: NORMAL
IMM GRANULOCYTES # BLD AUTO: 0.02 10*3/MM3 (ref 0–0.05)
IMM GRANULOCYTES NFR BLD AUTO: 0.3 % (ref 0–0.5)
LYMPHOCYTES # BLD AUTO: 2.42 10*3/MM3 (ref 0.7–3.1)
LYMPHOCYTES NFR BLD AUTO: 31.9 % (ref 19.6–45.3)
MCH RBC QN AUTO: 31.1 PG (ref 26.6–33)
MCHC RBC AUTO-ENTMCNC: 32.1 G/DL (ref 31.5–35.7)
MCV RBC AUTO: 96.9 FL (ref 79–97)
MONOCYTES # BLD AUTO: 0.78 10*3/MM3 (ref 0.1–0.9)
MONOCYTES NFR BLD AUTO: 10.3 % (ref 5–12)
NEUTROPHILS NFR BLD AUTO: 3.8 10*3/MM3 (ref 1.7–7)
NEUTROPHILS NFR BLD AUTO: 50.1 % (ref 42.7–76)
NRBC BLD AUTO-RTO: 0 /100 WBC (ref 0–0.2)
NT-PROBNP SERPL-MCNC: 376.4 PG/ML (ref 0–1800)
PLATELET # BLD AUTO: 246 10*3/MM3 (ref 140–450)
PMV BLD AUTO: 10.1 FL (ref 6–12)
POTASSIUM SERPL-SCNC: 5.1 MMOL/L (ref 3.5–5.2)
PROT SERPL-MCNC: 6.7 G/DL (ref 6–8.5)
RBC # BLD AUTO: 3.51 10*6/MM3 (ref 3.77–5.28)
SARS-COV-2 RNA RESP QL NAA+PROBE: NOT DETECTED
SODIUM SERPL-SCNC: 141 MMOL/L (ref 136–145)
TROPONIN T SERPL HS-MCNC: 31 NG/L
TROPONIN T SERPL HS-MCNC: 32 NG/L
WBC NRBC COR # BLD AUTO: 7.58 10*3/MM3 (ref 3.4–10.8)
WHOLE BLOOD HOLD COAG: NORMAL
WHOLE BLOOD HOLD SPECIMEN: NORMAL

## 2024-02-14 PROCEDURE — 80053 COMPREHEN METABOLIC PANEL: CPT | Performed by: EMERGENCY MEDICINE

## 2024-02-14 PROCEDURE — 99284 EMERGENCY DEPT VISIT MOD MDM: CPT

## 2024-02-14 PROCEDURE — 87636 SARSCOV2 & INF A&B AMP PRB: CPT | Performed by: EMERGENCY MEDICINE

## 2024-02-14 PROCEDURE — 85025 COMPLETE CBC W/AUTO DIFF WBC: CPT | Performed by: EMERGENCY MEDICINE

## 2024-02-14 PROCEDURE — 71045 X-RAY EXAM CHEST 1 VIEW: CPT

## 2024-02-14 PROCEDURE — 36415 COLL VENOUS BLD VENIPUNCTURE: CPT

## 2024-02-14 PROCEDURE — 25010000002 METHYLPREDNISOLONE PER 125 MG: Performed by: EMERGENCY MEDICINE

## 2024-02-14 PROCEDURE — 96374 THER/PROPH/DIAG INJ IV PUSH: CPT

## 2024-02-14 PROCEDURE — 93005 ELECTROCARDIOGRAM TRACING: CPT | Performed by: EMERGENCY MEDICINE

## 2024-02-14 PROCEDURE — 84484 ASSAY OF TROPONIN QUANT: CPT | Performed by: EMERGENCY MEDICINE

## 2024-02-14 PROCEDURE — 83880 ASSAY OF NATRIURETIC PEPTIDE: CPT | Performed by: EMERGENCY MEDICINE

## 2024-02-14 PROCEDURE — 94640 AIRWAY INHALATION TREATMENT: CPT

## 2024-02-14 RX ORDER — IPRATROPIUM BROMIDE AND ALBUTEROL SULFATE 2.5; .5 MG/3ML; MG/3ML
3 SOLUTION RESPIRATORY (INHALATION) ONCE
Status: COMPLETED | OUTPATIENT
Start: 2024-02-14 | End: 2024-02-14

## 2024-02-14 RX ORDER — SODIUM CHLORIDE 0.9 % (FLUSH) 0.9 %
10 SYRINGE (ML) INJECTION AS NEEDED
Status: DISCONTINUED | OUTPATIENT
Start: 2024-02-14 | End: 2024-02-15 | Stop reason: HOSPADM

## 2024-02-14 RX ORDER — PREDNISONE 20 MG/1
20 TABLET ORAL DAILY
Qty: 5 TABLET | Refills: 0 | Status: SHIPPED | OUTPATIENT
Start: 2024-02-14 | End: 2024-02-20

## 2024-02-14 RX ORDER — METHYLPREDNISOLONE SODIUM SUCCINATE 125 MG/2ML
125 INJECTION, POWDER, LYOPHILIZED, FOR SOLUTION INTRAMUSCULAR; INTRAVENOUS ONCE
Status: COMPLETED | OUTPATIENT
Start: 2024-02-14 | End: 2024-02-14

## 2024-02-14 RX ADMIN — IPRATROPIUM BROMIDE AND ALBUTEROL SULFATE 3 ML: .5; 3 SOLUTION RESPIRATORY (INHALATION) at 20:52

## 2024-02-14 RX ADMIN — METHYLPREDNISOLONE SODIUM SUCCINATE 125 MG: 125 INJECTION, POWDER, FOR SOLUTION INTRAMUSCULAR; INTRAVENOUS at 23:08

## 2024-02-15 NOTE — ED PROVIDER NOTES
Subjective   History of Present Illness  This is a 83-year-old female with past medical history of obstructive sleep apnea and diabetes presenting to the emergency department with some wheezing.  The patient is accompanied by the daughter who helps provide history.  She states that she was going to sleep when she started making some high-pitched wheezing noises.  The daughter states that this is atypical for her.  She normally wears a CPAP at night, however she had it off at this time.  Appear to be worse when she laid flat.  Other than that she has not had any cough.  No hemoptysis .  Has been the leg swelling.  She denies any fevers or chills.  No headache or change in vision.  No focal weakness.  No chest pain or shortness of breath.    History provided by:  Patient and relative   used: No        Review of Systems   Constitutional:  Negative for chills and fever.   HENT:  Negative for congestion, ear pain and sore throat.    Eyes:  Negative for visual disturbance.   Respiratory:  Positive for wheezing. Negative for shortness of breath.    Cardiovascular:  Negative for chest pain.   Gastrointestinal:  Negative for abdominal pain.   Genitourinary:  Negative for difficulty urinating.   Musculoskeletal:  Negative for arthralgias.   Skin:  Negative for rash.   Neurological:  Negative for dizziness, weakness and numbness.   Psychiatric/Behavioral:  Negative for agitation.        No past medical history on file.    No Known Allergies    No past surgical history on file.    No family history on file.    Social History     Socioeconomic History    Marital status:            Objective   Physical Exam  Vitals and nursing note reviewed.   Constitutional:       General: She is not in acute distress.     Appearance: She is not ill-appearing or toxic-appearing.   HENT:      Mouth/Throat:      Pharynx: No posterior oropharyngeal erythema.   Eyes:      Conjunctiva/sclera: Conjunctivae normal.       Pupils: Pupils are equal, round, and reactive to light.   Cardiovascular:      Rate and Rhythm: Normal rate and regular rhythm.   Pulmonary:      Effort: Pulmonary effort is normal. No respiratory distress.      Breath sounds: Wheezing present.   Abdominal:      General: Abdomen is flat. There is no distension.      Palpations: There is no mass.      Tenderness: There is no abdominal tenderness. There is no guarding or rebound.   Musculoskeletal:         General: No deformity. Normal range of motion.   Skin:     General: Skin is warm.      Findings: No rash.   Neurological:      General: No focal deficit present.      Mental Status: She is alert and oriented to person, place, and time.      Motor: No weakness.         ECG 12 Lead ED Triage Standing Order; SOA      Date/Time: 2/14/2024 8:59 PM    Performed by: Salo Lee MD  Authorized by: Salo Lee MD  Interpreted by ED physician  Comparison: compared with previous ECG   Similar to previous ECG  Rhythm: sinus rhythm  Rate: normal  BPM: 61  QRS axis: normal  Conduction: conduction normal  ST Segments: ST segments normal  Other: no other findings  Clinical impression: normal ECG  Comments: EKG was directly visualized by myself, interpretations as documented in hospital course.               ED Course  ED Course as of 02/14/24 2304 Wed Feb 14, 2024 2100 BP: 132/63 [JK]   2100 Temp: 97.8 °F (36.6 °C) [JK]   2100 Temp src: Oral [JK]   2100 Heart Rate: 64 [JK]   2100 Resp: 16 [JK]   2100 SpO2: 91 % [JK]   2100 Device (Oxygen Therapy): room air  Patient's repeat vitals, telemetry tracing, and pulse oximetry tracing were directly viewed and interpreted by myself.  Normal sinus rhythm [JK]   2302 CBC & Differential(!) [JK]   2302 BNP [JK]   2302 Comprehensive Metabolic Panel(!) [JK]   2302 Single High Sensitivity Troponin T(!) [JK]   2302 COVID-19 and FLU A/B PCR, 1 HR TAT - Swab, Nasopharynx  Laboratory studies were reviewed and interpreted directly  by myself.  CBC showed some chronic anemia with a hemoglobin of 10.9, BNP was 376, CMP shows a mild acute kidney injury with a BUN of 25 creatinine 1.4, initial troponin was 32, repeat was 31 with a delta gap of -1, respiratory panel is normal. [JK]   2302 XR Chest 1 View  Imaging was directly visualized by myself, per my interpretations, chest x-ray showed some chronic cardiomegaly with some pulmonary vascular congestion [JK]   2303 On reevaluation, the patient is at the baseline.  There is no respiratory distress or increased oxygen demand.  Patient does not have any evidence of airway obstruction.  She will continue her CPAP and supplemental oxygen at home.  Patient follow-up with PCP in 48 hours.  Given strict return precautions.  Verbalized understanding. [JK]   2303 I had a discussion with the patient/family regarding diagnosis, diagnostic results, treatment plan, and medications. The patient/family indicated understanding of these instructions. I spent adequate time at the bedside prior to discharge necessary to discuss the aftercare instructions, giving patient education, providing explanations of the results of our evaluations/findings, and my decision making to assure that the patient/family understand the plan of care. Time was allotted to answer questions at that time and throughout the ED course. Patient is required to maintain timely follow up, as discussed. I also discussed the potential for the development of an acute emergent condition requiring further evaluation, return to the ER, admission, or even surgical intervention.  I encouraged the patient to return to the emergency department immediately for any concerns, worsening symptoms, new complaints, or if symptoms persist and they are unable to seek follow-up in a timely fashion. The patient/family expressed understanding and agreement with this plan    Shared decision making:   After full review of the patient's clinical presentation, review of  any work-up including but not limited to laboratory studies and radiology obtained, I had a discussion with the patient.  Treatment options were discussed as well as the risks, benefits and consequences.  I discussed all findings with the patient and family members if available.  During the discussion, treatment goals were understood by all as well as any misconceptions which were addressed with the patient.  Ample time was given for any questions they may have had.  They are in agreement with the treatment plan as well as final disposition. [JK]      ED Course User Index  [JK] Salo Lee MD                                             Medical Decision Making  This is a 83-year-old female with a history of obstructive sleep apnea and diabetes presented to the emergency department with some wheezing.  The patient does have some mild wheezing on examination.  Seems most consistent with small airway disease, possibly bronchitis.  Otherwise the patient is hemodynamically stable.  Nontoxic.  There is no evidence of respiratory distress at this time.  IV access will be established and the patient.  Placed on continuous telemetry monitoring.  Workup initiated.      Differential diagnosis: URI, bronchitis, COVID, influenza, sinusitis, viral syndrome, RSV, CHF, edema, small airway disease      Amount and/or Complexity of Data Reviewed  External Data Reviewed: labs, radiology, ECG and notes.     Details: External laboratories, imaging as well as notes were reviewed personally by myself.  All relevant studies were used to guide decision making.     Date of previous record: 6/19/2021    Source of note: Admission record    Summary: Patient was admitted for respiratory failure and pneumonia.  I did review multiple laboratory studies on file as well as a previous chest x-ray and EKG.  Records reviewed    Labs: ordered. Decision-making details documented in ED Course.  Radiology: ordered and independent interpretation  performed.  ECG/medicine tests: ordered and independent interpretation performed. Decision-making details documented in ED Course.    Risk  Prescription drug management.        Final diagnoses:   Chronic bronchitis, unspecified chronic bronchitis type       ED Disposition  ED Disposition       ED Disposition   Discharge    Condition   Stable    Comment   --               Elida Hicks DO  [address]  [address]  [place] [id number]  [phone]    Call in 1 day           Medication List        New Prescriptions      predniSONE 20 MG tablet  Commonly known as: DELTASONE  Take 1 tablet by mouth Daily for 5 days.               Where to Get Your Medications        These medications were sent to [facility] [place] [address]      Hours: Monday to Friday 8 AM to 6 PM Phone: [phone]   predniSONE 20 MG tablet            Salo Lee MD  02/14/24 2833

## 2024-03-04 ENCOUNTER — TELEPHONE (OUTPATIENT)
Dept: FAMILY MEDICINE CLINIC | Age: 84
End: 2024-03-04

## 2024-03-04 NOTE — TELEPHONE ENCOUNTER
Patient called the office asking if you could take her on as a new patient, her sister is already a patient with you. She will be moving Thanks.

## 2024-03-20 ENCOUNTER — TELEPHONE (OUTPATIENT)
Dept: FAMILY MEDICINE CLINIC | Age: 84
End: 2024-03-20

## 2024-03-20 NOTE — TELEPHONE ENCOUNTER
Received phone call from the pt asking you will accept pt as a new pt please advise sister is a pt of you ([phone] )

## 2024-03-20 NOTE — TELEPHONE ENCOUNTER
Received phone call from pt sister, Alessia asking if marck will accept her as a new pt. No information was provided to Alessia as she is not on her HIPAA form advised alessia to have the pt contact our office to see if she can est with [facility].

## 2024-03-22 ENCOUNTER — HOSPITAL ENCOUNTER (EMERGENCY)
Age: 84
Discharge: HOME | End: 2024-03-22
Payer: MEDICARE

## 2024-03-22 VITALS
HEART RATE: 82 BPM | OXYGEN SATURATION: 100 % | DIASTOLIC BLOOD PRESSURE: 95 MMHG | BODY MASS INDEX: 46.87 KG/M2 | WEIGHT: 240 LBS | RESPIRATION RATE: 20 BRPM | TEMPERATURE: 98.2 F | SYSTOLIC BLOOD PRESSURE: 167 MMHG

## 2024-03-22 DIAGNOSIS — G89.29 ACUTE EXACERBATION OF CHRONIC LOW BACK PAIN: Primary | ICD-10-CM

## 2024-03-22 DIAGNOSIS — M54.50 ACUTE EXACERBATION OF CHRONIC LOW BACK PAIN: Primary | ICD-10-CM

## 2024-03-22 LAB
BACTERIA URNS QL MICRO: ABNORMAL /HPF
BILIRUB UR QL STRIP.AUTO: NEGATIVE
CLARITY UR: CLEAR
COLOR UR: ABNORMAL
EPI CELLS #/AREA URNS HPF: ABNORMAL /HPF (ref 0–5)
GLUCOSE UR STRIP.AUTO-MCNC: NEGATIVE MG/DL
HGB UR QL STRIP.AUTO: NEGATIVE
KETONES UR STRIP.AUTO-MCNC: NEGATIVE MG/DL
LEUKOCYTE ESTERASE UR QL STRIP.AUTO: ABNORMAL
NITRITE UR QL STRIP.AUTO: NEGATIVE
PH UR STRIP.AUTO: 7 [PH] (ref 5–8)
PROT UR STRIP.AUTO-MCNC: NEGATIVE MG/DL
RBC #/AREA URNS HPF: ABNORMAL /HPF (ref 0–4)
SP GR UR STRIP.AUTO: 1.01 (ref 1–1.03)
UA COMPLETE W REFLEX CULTURE PNL UR: YES
UA DIPSTICK W REFLEX MICRO PNL UR: YES
URN SPEC COLLECT METH UR: ABNORMAL
UROBILINOGEN UR STRIP-ACNC: 0.2 E.U./DL
WBC #/AREA URNS HPF: ABNORMAL /HPF (ref 0–5)

## 2024-03-22 PROCEDURE — 81001 URINALYSIS AUTO W/SCOPE: CPT

## 2024-03-22 PROCEDURE — 6360000002 HC RX W HCPCS: Performed by: REGISTERED NURSE

## 2024-03-22 PROCEDURE — 99284 EMERGENCY DEPT VISIT MOD MDM: CPT

## 2024-03-22 PROCEDURE — 87086 URINE CULTURE/COLONY COUNT: CPT

## 2024-03-22 PROCEDURE — 96372 THER/PROPH/DIAG INJ SC/IM: CPT

## 2024-03-22 RX ORDER — KETOROLAC TROMETHAMINE 15 MG/ML
30 INJECTION, SOLUTION INTRAMUSCULAR; INTRAVENOUS ONCE
Status: COMPLETED | OUTPATIENT
Start: 2024-03-22 | End: 2024-03-22

## 2024-03-22 RX ORDER — LIDOCAINE 50 MG/G
1 PATCH TOPICAL DAILY
Qty: 10 PATCH | Refills: 0 | Status: SHIPPED | OUTPATIENT
Start: 2024-03-22 | End: 2024-03-22

## 2024-03-22 RX ORDER — METHYLPREDNISOLONE 4 MG/1
TABLET ORAL
Qty: 1 KIT | Refills: 0 | Status: SHIPPED | OUTPATIENT
Start: 2024-03-22 | End: 2024-04-04 | Stop reason: ALTCHOICE

## 2024-03-22 RX ORDER — NAPROXEN 500 MG/1
500 TABLET ORAL 2 TIMES DAILY
Qty: 20 TABLET | Refills: 0 | Status: SHIPPED | OUTPATIENT
Start: 2024-03-22 | End: 2024-04-01

## 2024-03-22 RX ORDER — METHYLPREDNISOLONE 4 MG/1
TABLET ORAL
Qty: 1 KIT | Refills: 0 | Status: SHIPPED | OUTPATIENT
Start: 2024-03-22 | End: 2024-03-22

## 2024-03-22 RX ORDER — LIDOCAINE 50 MG/G
1 PATCH TOPICAL DAILY
Qty: 10 PATCH | Refills: 0 | Status: SHIPPED | OUTPATIENT
Start: 2024-03-22 | End: 2024-04-01

## 2024-03-22 RX ORDER — NAPROXEN 500 MG/1
500 TABLET ORAL 2 TIMES DAILY
Qty: 20 TABLET | Refills: 0 | Status: SHIPPED | OUTPATIENT
Start: 2024-03-22 | End: 2024-03-22

## 2024-03-22 RX ADMIN — KETOROLAC TROMETHAMINE 30 MG: 15 INJECTION, SOLUTION INTRAMUSCULAR; INTRAVENOUS at 19:49

## 2024-03-22 ASSESSMENT — PAIN SCALES - GENERAL
PAINLEVEL_OUTOF10: 10
PAINLEVEL_OUTOF10: 9
PAINLEVEL_OUTOF10: 10

## 2024-03-22 ASSESSMENT — PAIN DESCRIPTION - ORIENTATION
ORIENTATION: LEFT;RIGHT
ORIENTATION: LOWER;RIGHT;LEFT
ORIENTATION: LEFT;RIGHT;LOWER

## 2024-03-22 ASSESSMENT — ENCOUNTER SYMPTOMS
GASTROINTESTINAL NEGATIVE: 1
RESPIRATORY NEGATIVE: 1
BACK PAIN: 1

## 2024-03-22 ASSESSMENT — PAIN DESCRIPTION - PAIN TYPE
TYPE: CHRONIC PAIN
TYPE: CHRONIC PAIN

## 2024-03-22 ASSESSMENT — PAIN - FUNCTIONAL ASSESSMENT
PAIN_FUNCTIONAL_ASSESSMENT: 0-10
PAIN_FUNCTIONAL_ASSESSMENT: PREVENTS OR INTERFERES SOME ACTIVE ACTIVITIES AND ADLS

## 2024-03-22 ASSESSMENT — PAIN DESCRIPTION - LOCATION
LOCATION: BACK;LEG
LOCATION: LEG
LOCATION: BACK;LEG

## 2024-03-22 ASSESSMENT — PAIN DESCRIPTION - DESCRIPTORS
DESCRIPTORS: ACHING

## 2024-03-22 NOTE — ED PROVIDER NOTES
[facility] ED  EMERGENCY DEPARTMENT ENCOUNTER        Pt Name: Courtney Taylor  MRN: [id number]  Birthdate 1940  Date of evaluation: 3/22/2024  Provider: NESSA Arriaga CNP  PCP: No primary care provider on file.  Note Started: 7:43 PM EDT 3/22/24      FELECIA. I have evaluated this patient.        CHIEF COMPLAINT       Chief Complaint   Patient presents with    Leg Pain     C/o chronic bilateral leg pain. Sees pain mgmt for same, had injections last Tuesday for pain.       HISTORY OF PRESENT ILLNESS: 1 or more Elements     History From: Patient    Limitations to history : None    Social Determinants Significantly Affecting Health : None    Chief Complaint: Chronic low back pain with bilateral sciatica    Courtney Taylor is a 83 y.o. female who presents to the emergency department today with chronic low back pain with bilateral sciatica.  She states that she has been experiencing chronic low back pain for the past 3 years that has been significantly worse over the past several days.  She states that she attempted to go to pain management after moving to this area from [place], however, was turned away for reasons unknown to her.  She states that she has been having trouble ambulating due to this pain and notes that she is having shooting pains that run down bilateral legs.  She states that she has been using her oxycodone 5-325 mg as previously prescribed from her pain management clinic which has been less than helpful.  She states she is also been using gabapentin which was recently increased and has a no appreciable differences in the alleviation of her pain.  She states that she is also been using some Tylenol which is also not been especially helpful.  She denies any fever, cough or congestion, chest pain or shortness of breath, loss of bowel or bladder control, saddle paresthesias, or any new or worsening numbness of the lower extremities.  She states that she is generally healthy  tobacco: Never   Substance Use Topics    Alcohol use: No    Drug use: No       SCREENINGS          Shyam Coma Scale  Eye Opening: Spontaneous  Best Verbal Response: Oriented  Best Motor Response: Obeys commands  Shyam Coma Scale Score: 15              CIWA Assessment  BP: (!) 167/95  Pulse: 82             PHYSICAL EXAM  1 or more Elements     ED Triage Vitals   BP Temp Temp Source Pulse Respirations SpO2 Height Weight - Scale   03/22/24 1621 03/22/24 1620 03/22/24 1620 03/22/24 1620 03/22/24 1620 03/22/24 1620 -- 03/22/24 1620   (!) 167/95 98.2 °F (36.8 °C) Tympanic 82 20 100 %  108.9 kg (240 lb)       Physical Exam  Vitals and nursing note reviewed.   Constitutional:       Appearance: Normal appearance. She is not ill-appearing.   HENT:      Head: Normocephalic and atraumatic.      Right Ear: External ear normal.      Left Ear: External ear normal.      Nose: Nose normal.   Eyes:      General:         Right eye: No discharge.         Left eye: No discharge.      Conjunctiva/sclera: Conjunctivae normal.   Cardiovascular:      Rate and Rhythm: Normal rate and regular rhythm.      Pulses: Normal pulses.      Heart sounds: Normal heart sounds. No murmur heard.  Pulmonary:      Effort: Pulmonary effort is normal. No respiratory distress.      Breath sounds: Normal breath sounds. No stridor. No wheezing, rhonchi or rales.   Abdominal:      General: Bowel sounds are normal. There is no distension.      Palpations: Abdomen is soft. There is no mass.      Tenderness: There is no abdominal tenderness. There is no right CVA tenderness, left CVA tenderness or guarding.   Musculoskeletal:      Cervical back: Normal and normal range of motion.      Thoracic back: Normal.      Lumbar back: Spasms and tenderness present. No swelling, signs of trauma, lacerations or bony tenderness. Decreased range of motion.   Skin:     General: Skin is warm and dry.   Neurological:      General: No focal deficit present.      Mental Status:

## 2024-03-23 NOTE — ED NOTES
--Patient and family provided with discharge instructions and any prescriptions.   --Instructions, dosing, and follow-up appointments reviewed with patient/family. No further questions or needs at this time.   --Vital signs and patient stable upon discharge.  --Patient ambulatory to lobby with family.

## 2024-03-23 NOTE — DISCHARGE INSTRUCTIONS
I would like for you to follow-up with the [facility] back and spine center with the provided referral on the front of this packet    Please begin taking the oral steroids as prescribed as well as the naproxen twice daily for the next 10 days    Continue taking your oxycodone as needed for acute pain of your low back    If you begin experiencing any fever, numbness or tingling around your genitals, or an inability to walk or ambulate, please present directly to the emergency department for evaluation

## 2024-03-24 LAB — BACTERIA UR CULT: NORMAL

## 2024-04-04 ENCOUNTER — OFFICE VISIT (OUTPATIENT)
Dept: FAMILY MEDICINE CLINIC | Age: 84
End: 2024-04-04
Payer: MEDICARE

## 2024-04-04 ENCOUNTER — TELEPHONE (OUTPATIENT)
Dept: FAMILY MEDICINE CLINIC | Age: 84
End: 2024-04-04

## 2024-04-04 VITALS
HEIGHT: 60 IN | BODY MASS INDEX: 47.51 KG/M2 | DIASTOLIC BLOOD PRESSURE: 70 MMHG | HEART RATE: 53 BPM | OXYGEN SATURATION: 98 % | SYSTOLIC BLOOD PRESSURE: 120 MMHG | WEIGHT: 242 LBS

## 2024-04-04 DIAGNOSIS — R09.89 BILATERAL CAROTID BRUITS: ICD-10-CM

## 2024-04-04 DIAGNOSIS — B35.1 ONYCHOMYCOSIS: ICD-10-CM

## 2024-04-04 DIAGNOSIS — Z76.89 ENCOUNTER TO ESTABLISH CARE: Primary | ICD-10-CM

## 2024-04-04 DIAGNOSIS — E78.5 HYPERLIPIDEMIA ASSOCIATED WITH TYPE 2 DIABETES MELLITUS (HCC): ICD-10-CM

## 2024-04-04 DIAGNOSIS — I10 DIABETES MELLITUS WITH COINCIDENT HYPERTENSION (HCC): ICD-10-CM

## 2024-04-04 DIAGNOSIS — E11.29 TYPE 2 DIABETES MELLITUS WITH DIABETIC MICROALBUMINURIA, WITHOUT LONG-TERM CURRENT USE OF INSULIN (HCC): ICD-10-CM

## 2024-04-04 DIAGNOSIS — E66.01 MORBID OBESITY WITH BMI OF 45.0-49.9, ADULT (HCC): ICD-10-CM

## 2024-04-04 DIAGNOSIS — R80.9 TYPE 2 DIABETES MELLITUS WITH DIABETIC MICROALBUMINURIA, WITHOUT LONG-TERM CURRENT USE OF INSULIN (HCC): ICD-10-CM

## 2024-04-04 DIAGNOSIS — E89.0 POSTOPERATIVE HYPOTHYROIDISM: ICD-10-CM

## 2024-04-04 DIAGNOSIS — Z78.0 POST-MENOPAUSAL: ICD-10-CM

## 2024-04-04 DIAGNOSIS — E11.9 DIABETES MELLITUS WITH COINCIDENT HYPERTENSION (HCC): ICD-10-CM

## 2024-04-04 DIAGNOSIS — F39 MOOD DISORDER (HCC): ICD-10-CM

## 2024-04-04 DIAGNOSIS — E11.69 HYPERLIPIDEMIA ASSOCIATED WITH TYPE 2 DIABETES MELLITUS (HCC): ICD-10-CM

## 2024-04-04 DIAGNOSIS — E83.42 HYPOMAGNESEMIA: ICD-10-CM

## 2024-04-04 DIAGNOSIS — F03.A0 MILD DEMENTIA WITHOUT BEHAVIORAL DISTURBANCE, PSYCHOTIC DISTURBANCE, MOOD DISTURBANCE, OR ANXIETY, UNSPECIFIED DEMENTIA TYPE (HCC): ICD-10-CM

## 2024-04-04 DIAGNOSIS — R06.09 DYSPNEA ON EXERTION: ICD-10-CM

## 2024-04-04 DIAGNOSIS — M79.89 BILATERAL SWELLING OF FEET: ICD-10-CM

## 2024-04-04 PROBLEM — S92.324A CLOSED NONDISPLACED FRACTURE OF SECOND METATARSAL BONE OF RIGHT FOOT: Status: RESOLVED | Noted: 2018-06-21 | Resolved: 2024-04-04

## 2024-04-04 LAB
CREATININE URINE POCT: 50
MICROALBUMIN/CREAT 24H UR: 30 MG/G{CREAT}
MICROALBUMIN/CREAT UR-RTO: ABNORMAL

## 2024-04-04 PROCEDURE — 1123F ACP DISCUSS/DSCN MKR DOCD: CPT | Performed by: PHYSICIAN ASSISTANT

## 2024-04-04 PROCEDURE — 3078F DIAST BP <80 MM HG: CPT | Performed by: PHYSICIAN ASSISTANT

## 2024-04-04 PROCEDURE — 3074F SYST BP LT 130 MM HG: CPT | Performed by: PHYSICIAN ASSISTANT

## 2024-04-04 PROCEDURE — 82044 UR ALBUMIN SEMIQUANTITATIVE: CPT | Performed by: PHYSICIAN ASSISTANT

## 2024-04-04 PROCEDURE — 99205 OFFICE O/P NEW HI 60 MIN: CPT | Performed by: PHYSICIAN ASSISTANT

## 2024-04-04 RX ORDER — VIT C/B6/B5/MAGNESIUM/HERB 173 50-5-6-5MG
CAPSULE ORAL DAILY
COMMUNITY

## 2024-04-04 RX ORDER — ASPIRIN 81 MG/1
81 TABLET ORAL DAILY
COMMUNITY

## 2024-04-04 RX ORDER — CICLOPIROX 80 MG/ML
SOLUTION TOPICAL
Qty: 1 EACH | Refills: 1 | Status: SHIPPED | OUTPATIENT
Start: 2024-04-04

## 2024-04-04 RX ORDER — TIRZEPATIDE 7.5 MG/.5ML
7.5 INJECTION, SOLUTION SUBCUTANEOUS WEEKLY
Qty: 6 ML | Refills: 1 | Status: SHIPPED | OUTPATIENT
Start: 2024-04-04 | End: 2024-10-01

## 2024-04-04 RX ORDER — FUROSEMIDE 20 MG/1
TABLET ORAL
Qty: 90 TABLET | Refills: 1 | Status: SHIPPED | OUTPATIENT
Start: 2024-04-04

## 2024-04-04 RX ORDER — ATORVASTATIN CALCIUM 10 MG/1
10 TABLET, FILM COATED ORAL DAILY
Qty: 90 TABLET | Refills: 1 | Status: SHIPPED | OUTPATIENT
Start: 2024-04-04

## 2024-04-04 RX ORDER — MEMANTINE HYDROCHLORIDE 10 MG/1
10 TABLET ORAL DAILY
COMMUNITY
End: 2024-04-04 | Stop reason: SDUPTHER

## 2024-04-04 RX ORDER — CITALOPRAM 20 MG/1
20 TABLET ORAL DAILY
Qty: 30 TABLET | Refills: 0 | Status: SHIPPED | OUTPATIENT
Start: 2024-04-04

## 2024-04-04 RX ORDER — LOSARTAN POTASSIUM 50 MG/1
50 TABLET ORAL DAILY
Qty: 90 TABLET | Refills: 1 | Status: SHIPPED | OUTPATIENT
Start: 2024-04-04

## 2024-04-04 RX ORDER — LEVOTHYROXINE SODIUM 0.15 MG/1
150 TABLET ORAL DAILY
Qty: 90 TABLET | Refills: 1 | Status: SHIPPED | OUTPATIENT
Start: 2024-04-04 | End: 2024-10-01

## 2024-04-04 RX ORDER — FUROSEMIDE 20 MG/1
TABLET ORAL
COMMUNITY
End: 2024-04-04 | Stop reason: SDUPTHER

## 2024-04-04 RX ORDER — POTASSIUM CHLORIDE 750 MG/1
10 CAPSULE, EXTENDED RELEASE ORAL 2 TIMES DAILY
Qty: 180 CAPSULE | Refills: 1 | Status: SHIPPED | OUTPATIENT
Start: 2024-04-04 | End: 2024-10-01

## 2024-04-04 RX ORDER — GABAPENTIN 800 MG/1
800 TABLET ORAL 3 TIMES DAILY
Qty: 90 TABLET | Refills: 0 | Status: SHIPPED | OUTPATIENT
Start: 2024-04-04 | End: 2024-05-04

## 2024-04-04 RX ORDER — OXYCODONE AND ACETAMINOPHEN 10; 325 MG/1; MG/1
TABLET ORAL
COMMUNITY
Start: 2024-02-15

## 2024-04-04 RX ORDER — DONEPEZIL HYDROCHLORIDE 10 MG/1
10 TABLET, FILM COATED ORAL NIGHTLY
COMMUNITY
End: 2024-04-04 | Stop reason: SDUPTHER

## 2024-04-04 RX ORDER — MEMANTINE HYDROCHLORIDE 10 MG/1
10 TABLET ORAL DAILY
Qty: 90 TABLET | Refills: 1 | Status: SHIPPED | OUTPATIENT
Start: 2024-04-04 | End: 2024-10-01

## 2024-04-04 RX ORDER — POTASSIUM CHLORIDE 750 MG/1
10 CAPSULE, EXTENDED RELEASE ORAL 2 TIMES DAILY
COMMUNITY
End: 2024-04-04 | Stop reason: SDUPTHER

## 2024-04-04 RX ORDER — DONEPEZIL HYDROCHLORIDE 10 MG/1
10 TABLET, FILM COATED ORAL NIGHTLY
Qty: 90 TABLET | Refills: 1 | Status: SHIPPED | OUTPATIENT
Start: 2024-04-04

## 2024-04-04 SDOH — ECONOMIC STABILITY: HOUSING INSECURITY
IN THE LAST 12 MONTHS, WAS THERE A TIME WHEN YOU DID NOT HAVE A STEADY PLACE TO SLEEP OR SLEPT IN A SHELTER (INCLUDING NOW)?: NO

## 2024-04-04 SDOH — ECONOMIC STABILITY: FOOD INSECURITY: WITHIN THE PAST 12 MONTHS, YOU WORRIED THAT YOUR FOOD WOULD RUN OUT BEFORE YOU GOT MONEY TO BUY MORE.: NEVER TRUE

## 2024-04-04 SDOH — ECONOMIC STABILITY: FOOD INSECURITY: WITHIN THE PAST 12 MONTHS, THE FOOD YOU BOUGHT JUST DIDN'T LAST AND YOU DIDN'T HAVE MONEY TO GET MORE.: NEVER TRUE

## 2024-04-04 SDOH — ECONOMIC STABILITY: INCOME INSECURITY: HOW HARD IS IT FOR YOU TO PAY FOR THE VERY BASICS LIKE FOOD, HOUSING, MEDICAL CARE, AND HEATING?: NOT HARD AT ALL

## 2024-04-04 ASSESSMENT — PATIENT HEALTH QUESTIONNAIRE - PHQ9
SUM OF ALL RESPONSES TO PHQ QUESTIONS 1-9: 0
SUM OF ALL RESPONSES TO PHQ QUESTIONS 1-9: 0
8. MOVING OR SPEAKING SO SLOWLY THAT OTHER PEOPLE COULD HAVE NOTICED. OR THE OPPOSITE, BEING SO FIGETY OR RESTLESS THAT YOU HAVE BEEN MOVING AROUND A LOT MORE THAN USUAL: NOT AT ALL
10. IF YOU CHECKED OFF ANY PROBLEMS, HOW DIFFICULT HAVE THESE PROBLEMS MADE IT FOR YOU TO DO YOUR WORK, TAKE CARE OF THINGS AT HOME, OR GET ALONG WITH OTHER PEOPLE: NOT DIFFICULT AT ALL
3. TROUBLE FALLING OR STAYING ASLEEP: NOT AT ALL
DEPRESSION UNABLE TO ASSESS: FUNCTIONAL CAPACITY MOTIVATION LIMITS ACCURACY
SUM OF ALL RESPONSES TO PHQ QUESTIONS 1-9: 0
6. FEELING BAD ABOUT YOURSELF - OR THAT YOU ARE A FAILURE OR HAVE LET YOURSELF OR YOUR FAMILY DOWN: NOT AT ALL
1. LITTLE INTEREST OR PLEASURE IN DOING THINGS: NOT AT ALL
9. THOUGHTS THAT YOU WOULD BE BETTER OFF DEAD, OR OF HURTING YOURSELF: NOT AT ALL
SUM OF ALL RESPONSES TO PHQ9 QUESTIONS 1 & 2: 0
SUM OF ALL RESPONSES TO PHQ QUESTIONS 1-9: 0
4. FEELING TIRED OR HAVING LITTLE ENERGY: NOT AT ALL
5. POOR APPETITE OR OVEREATING: NOT AT ALL
7. TROUBLE CONCENTRATING ON THINGS, SUCH AS READING THE NEWSPAPER OR WATCHING TELEVISION: NOT AT ALL
2. FEELING DOWN, DEPRESSED OR HOPELESS: NOT AT ALL

## 2024-04-04 ASSESSMENT — ENCOUNTER SYMPTOMS
WHEEZING: 0
CONSTIPATION: 0
ROS SKIN COMMENTS: NAIL DISCOLORATION
CHEST TIGHTNESS: 0
BLOOD IN STOOL: 0
ABDOMINAL PAIN: 0
SHORTNESS OF BREATH: 1

## 2024-04-04 NOTE — TELEPHONE ENCOUNTER
LM for pt to call back   Trying to figure out on her metoprolol 25 mg if she is taking it once a day or twice a day. If she calls back please let me know. Thank you

## 2024-04-04 NOTE — TELEPHONE ENCOUNTER
LM for pt to call back   Please see my previous message as well. Need to know the mg and if she's on XR or IR

## 2024-04-04 NOTE — PROGRESS NOTES
Courtney Taylor (:  1940) is a 83 y.o. female,Established patient, here for evaluation of the following chief complaint(s):  New Patient (Establish care )         ASSESSMENT/PLAN:  1. Encounter to establish care      -    reviewed history with the patient and her son, looked at records from outside agencies  2. Type 2 diabetes mellitus with diabetic microalbuminuria, without long-term current use of insulin (HCC)  -     Tirzepatide (MOUNJARO) 7.5 MG/0.5ML SOPN SC injection; Inject 0.5 mLs into the skin once a week, Disp-6 mL, R-1Normal  -     POCT microalbumin: abnormal, on lisinopril  -     Diabetic Foot Exam: abnormal, on gabapentin  -     Comprehensive Metabolic Panel; Future  -     Hemoglobin A1C; Future  -      pt will return for lab work, will continue with mounjaro for benefit of weight loss and blood sugar, last A1C was within normal range. Follow up with me in 6 months or sooner if needed  3. Hyperlipidemia associated with type 2 diabetes mellitus (HCC)  -     LIPID PANEL; Future  -     continue with lipitor, will adjust the dose after lab work is completed, follow up in 6 months  4. Diabetes mellitus with coincident hypertension (HCC)  -     Comprehensive Metabolic Panel; Future  -     well controlled, will continue with metoprolol, lasix and lisinopril, follow up in 6 months  5. Mild dementia without behavioral disturbance, psychotic disturbance, mood disturbance, or anxiety, unspecified dementia type (HCC)  -     Vitamin B12 & Folate; Future  -     donepezil (ARICEPT) 10 MG tablet; Take 1 tablet by mouth nightly, Disp-90 tablet, R-1Normal  -     memantine (NAMENDA) 10 MG tablet; Take 1 tablet by mouth daily, Disp-90 tablet, R-1Normal   -     offered referral to neurology but she declined, will continue with the above medications. Follow up in 6 months  6. Postoperative hypothyroidism  -     TSH; Future  -     T4, Free; Future  -      will adjust dose of her levothyroxine after lab work returns,

## 2024-04-04 NOTE — TELEPHONE ENCOUNTER
Patient was seen today, didn't realize she is almost out of medication, please send updated script to pharmacy, thanks.

## 2024-04-08 ENCOUNTER — TELEPHONE (OUTPATIENT)
Dept: FAMILY MEDICINE CLINIC | Age: 84
End: 2024-04-08

## 2024-04-08 DIAGNOSIS — I50.812 CHRONIC RIGHT-SIDED HEART FAILURE (HCC): Primary | ICD-10-CM

## 2024-04-08 NOTE — TELEPHONE ENCOUNTER
Received phone call from the pt in regards to she found the medication she stated that the bottle states,    Metoprolol tartrate 25 Mg she takes 1x daily

## 2024-04-08 NOTE — TELEPHONE ENCOUNTER
Pt is calling asking for a pain specialist referral. She stated that any Is ok, for her back and legs. She stated that she has moved and is needing a new referral placed. Please advise

## 2024-04-09 DIAGNOSIS — R09.89 BILATERAL CAROTID BRUITS: Primary | ICD-10-CM

## 2024-04-09 DIAGNOSIS — I50.812 CHRONIC RIGHT-SIDED HEART FAILURE (HCC): ICD-10-CM

## 2024-04-09 NOTE — TELEPHONE ENCOUNTER
Please call her son and see if they have heard back yet from Dr. Worley's office, if not, can we reach out to them to see if they need anything to process her referral

## 2024-04-09 NOTE — TELEPHONE ENCOUNTER
Left message for patient. If patient calls back please let her know the order was changed and she can call to get this scheduled.

## 2024-04-09 NOTE — TELEPHONE ENCOUNTER
Spoke to patient she said she needs a referral for Dr Worley   She said she never got a referral for him and they wont schedule without one  Please place referral and fax it and then call patient back with the number

## 2024-04-09 NOTE — TELEPHONE ENCOUNTER
Patient is no longer using [facility] in [place], she is now using [facility]. Please send updated script. Thanks.

## 2024-04-09 NOTE — TELEPHONE ENCOUNTER
Form completed, on Julita's desk, needs insurance info added, please fax with appropriate information listed on form and notify the patient when this has been done

## 2024-04-09 NOTE — TELEPHONE ENCOUNTER
Got a call from central scheduling regarding the US Carotid order from 4/4. Stated they need that order changed to VL instead of US. Please advise. Thanks [facility].

## 2024-04-29 DIAGNOSIS — G47.33 OSA (OBSTRUCTIVE SLEEP APNEA): Primary | ICD-10-CM

## 2024-04-29 RX ORDER — CILOSTAZOL 100 MG/1
100 TABLET ORAL 2 TIMES DAILY
Qty: 180 TABLET | Refills: 1 | Status: SHIPPED | OUTPATIENT
Start: 2024-04-29 | End: 2024-05-02 | Stop reason: SDUPTHER

## 2024-04-29 NOTE — TELEPHONE ENCOUNTER
Spoke to patient she said she was taking it for the pain in her legs   Referral information given as well

## 2024-04-29 NOTE — TELEPHONE ENCOUNTER
Was she taking that medication for pain in her legs related to a vascular issue?    Referral Details       Referred By   Referred To    Noa Cardoza PA   [address]   [address]   [facility] [address]   Phone: [phone]   Fax: [phone]    Diagnoses: MADI (obstructive sleep apnea)   Order: [facility]   Reason: Specialty Services Required    [facility]   [address]   [address]   [facility] [address]   Phone: [phone]   Fax: [phone]

## 2024-04-29 NOTE — TELEPHONE ENCOUNTER
Received phone call from the pt in regards to she found a bottle of medication called Cilostazol 100 Mg BID   Last was filled on 02/2024  She stated that the ordering provider was her previous PCP. Amor branham    She is asking if she should be taking this medication as she has left over medication of this.     Pt is also asking for a referral for a pulmonologist referral to continue using her Cpap and to get refill supplies for this.     Please advise

## 2024-05-02 RX ORDER — CILOSTAZOL 100 MG/1
100 TABLET ORAL 2 TIMES DAILY
Qty: 180 TABLET | Refills: 1 | Status: SHIPPED | OUTPATIENT
Start: 2024-05-02 | End: 2024-10-29

## 2024-05-02 NOTE — TELEPHONE ENCOUNTER
Patient states that she only pharmacy she uses is  [facility] in [facility]. I have updated her pharmacy and removed inactive ones.

## 2024-05-06 ENCOUNTER — TELEPHONE (OUTPATIENT)
Dept: FAMILY MEDICINE CLINIC | Age: 84
End: 2024-05-06

## 2024-05-06 NOTE — TELEPHONE ENCOUNTER
Every time I try to call Carolin back and putting in her extension it hangs up. Will await her return call

## 2024-05-06 NOTE — TELEPHONE ENCOUNTER
Carolin for [facility], calling and states patient is having an MRI and is unable to give them any information about a pain pump she has installed. Patient doesn't even know who installed the pump . MRI ordered by Dr Worley.   Carolin going to call Dr Worley office as well

## 2024-05-06 NOTE — TELEPHONE ENCOUNTER
It looks like possibly Dr. Bernard placed pump, please check with that office to see if they have any records of this.

## 2024-05-07 NOTE — TELEPHONE ENCOUNTER
Called and spoke with Polina from call center with Karen Cantu, Dr [facility]. She is putting a message back to see if they have any records of the patient getting the Pain Pump placement. Someone will return the call to the office once addressed. If they do have this information, please see if they can fax it over to the office so we can have this in our records, as the patient is new to [facility].

## 2024-05-08 ENCOUNTER — HOSPITAL ENCOUNTER (OUTPATIENT)
Dept: GENERAL RADIOLOGY | Age: 84
Discharge: HOME | End: 2024-05-08
Payer: MEDICARE

## 2024-05-08 ENCOUNTER — HOSPITAL ENCOUNTER (OUTPATIENT)
Age: 84
Discharge: HOME | End: 2024-05-10
Payer: MEDICARE

## 2024-05-08 DIAGNOSIS — Z78.0 POST-MENOPAUSAL: ICD-10-CM

## 2024-05-08 DIAGNOSIS — R09.89 BILATERAL CAROTID BRUITS: ICD-10-CM

## 2024-05-08 LAB
VAS LEFT ARM BP: 141 MMHG
VAS LEFT CCA DIST EDV: 13.7 CM/S
VAS LEFT CCA DIST PSV: 98.2 CM/S
VAS LEFT CCA MID EDV: 17.4 CM/S
VAS LEFT CCA MID PSV: 101 CM/S
VAS LEFT CCA PROX EDV: 11.8 CM/S
VAS LEFT CCA PROX PSV: 108 CM/S
VAS LEFT ECA PSV: 125 CM/S
VAS LEFT ICA DIST EDV: 16.8 CM/S
VAS LEFT ICA DIST PSV: 77.8 CM/S
VAS LEFT ICA MID EDV: 21 CM/S
VAS LEFT ICA MID PSV: 105 CM/S
VAS LEFT ICA PROX EDV: 11.2 CM/S
VAS LEFT ICA PROX PSV: 101 CM/S
VAS LEFT ICA/CCA PSV: 1.04
VAS LEFT SUBCLAVIAN PROX PSV: 422 CM/S
VAS LEFT VERTEBRAL EDV: 21.4 CM/S
VAS LEFT VERTEBRAL PSV: 101 CM/S
VAS RIGHT ARM BP: 158 MMHG
VAS RIGHT CCA DIST EDV: 18 CM/S
VAS RIGHT CCA DIST PSV: 96.3 CM/S
VAS RIGHT CCA MID EDV: 13.7 CM/S
VAS RIGHT CCA MID PSV: 98.8 CM/S
VAS RIGHT CCA PROX EDV: 10.6 CM/S
VAS RIGHT CCA PROX PSV: 103 CM/S
VAS RIGHT ECA PSV: 103 CM/S
VAS RIGHT ICA DIST EDV: 23 CM/S
VAS RIGHT ICA DIST PSV: 97.5 CM/S
VAS RIGHT ICA MID EDV: 22.4 CM/S
VAS RIGHT ICA MID PSV: 73.3 CM/S
VAS RIGHT ICA PROX EDV: 15.5 CM/S
VAS RIGHT ICA PROX PSV: 93.2 CM/S
VAS RIGHT ICA/CCA PSV: 0.99
VAS RIGHT SUBCLAVIAN PROX PSV: 200 CM/S
VAS RIGHT VERTEBRAL EDV: 11.2 CM/S
VAS RIGHT VERTEBRAL PSV: 73.9 CM/S

## 2024-05-08 PROCEDURE — 93880 EXTRACRANIAL BILAT STUDY: CPT | Performed by: SURGERY

## 2024-05-08 PROCEDURE — 77080 DXA BONE DENSITY AXIAL: CPT

## 2024-05-08 PROCEDURE — 93880 EXTRACRANIAL BILAT STUDY: CPT

## 2024-05-09 NOTE — TELEPHONE ENCOUNTER
Spoke with nurse at Dr[facility], she states that he does not do pain pumps and has not seen this patient.

## 2024-05-13 NOTE — TELEPHONE ENCOUNTER
This is an issue for Dr. Worley's office, they will need to find where she had the pain pump. May need to call her son to see if they have records from [place]

## 2024-05-14 ENCOUNTER — TELEMEDICINE (OUTPATIENT)
Age: 84
End: 2024-05-14
Payer: MEDICARE

## 2024-05-14 DIAGNOSIS — J42 CHRONIC BRONCHITIS WITH ACUTE EXACERBATION (HCC): Primary | ICD-10-CM

## 2024-05-14 DIAGNOSIS — J20.9 CHRONIC BRONCHITIS WITH ACUTE EXACERBATION (HCC): Primary | ICD-10-CM

## 2024-05-14 DIAGNOSIS — J06.9 URI WITH COUGH AND CONGESTION: ICD-10-CM

## 2024-05-14 DIAGNOSIS — J06.9 UPPER RESPIRATORY TRACT INFECTION, UNSPECIFIED TYPE: ICD-10-CM

## 2024-05-14 PROCEDURE — 99213 OFFICE O/P EST LOW 20 MIN: CPT | Performed by: NURSE PRACTITIONER

## 2024-05-14 PROCEDURE — 1123F ACP DISCUSS/DSCN MKR DOCD: CPT | Performed by: NURSE PRACTITIONER

## 2024-05-14 RX ORDER — DOXYCYCLINE HYCLATE 100 MG
100 TABLET ORAL 2 TIMES DAILY
Qty: 20 TABLET | Refills: 0 | Status: SHIPPED | OUTPATIENT
Start: 2024-05-14 | End: 2024-05-24

## 2024-05-14 RX ORDER — ALBUTEROL SULFATE 90 UG/1
2 AEROSOL, METERED RESPIRATORY (INHALATION) EVERY 4 HOURS PRN
Qty: 18 G | Refills: 3 | Status: SHIPPED | OUTPATIENT
Start: 2024-05-14

## 2024-05-14 RX ORDER — COVID-19 ANTIGEN TEST
1 KIT MISCELLANEOUS ONCE
Qty: 1 KIT | Refills: 0 | Status: SHIPPED | OUTPATIENT
Start: 2024-05-14 | End: 2024-05-14

## 2024-05-14 RX ORDER — BENZONATATE 100 MG/1
100-200 CAPSULE ORAL 3 TIMES DAILY PRN
Qty: 30 CAPSULE | Refills: 0 | Status: SHIPPED | OUTPATIENT
Start: 2024-05-14 | End: 2024-05-21

## 2024-05-14 RX ORDER — GUAIFENESIN 600 MG/1
600 TABLET, EXTENDED RELEASE ORAL 2 TIMES DAILY
Qty: 20 TABLET | Refills: 0 | Status: SHIPPED | OUTPATIENT
Start: 2024-05-14 | End: 2024-05-24

## 2024-05-14 ASSESSMENT — ENCOUNTER SYMPTOMS
SORE THROAT: 1
VOMITING: 0
CHEST TIGHTNESS: 0
WHEEZING: 1
NAUSEA: 0
DIARRHEA: 0
SINUS PRESSURE: 1
COUGH: 1
SINUS PAIN: 0
SHORTNESS OF BREATH: 1
TROUBLE SWALLOWING: 0

## 2024-05-14 NOTE — PATIENT INSTRUCTIONS
Notify office if you have no improvement or worsening of condition.   Will hold steroid for now but if not improving in 2-3 days can add to the regimen.  Will reorder inhalers and continue to use as prescribed.  Drink plenty of fluids and rest.  Practice good hand hygiene.  May sleep with humidifier as needed.  Gargle with warm salt water 3-4 times a day to help with sore throat.  You can use ice, warm chicken noodle soup, soft foods, popsicles and cough drops to help soothe your throat.  May take Tylenol 1000 mg 2 times a day as needed (limited due to Percocet 4 times a day) for fever/pain in addition to Percocet stagger.    Use mask, scarf or muffler when outside for long periods of time during cold temperatures and/or poor air quality to protect lungs.  Please review educational material.   Follow up with PCP in 3-4 days if no improvement or sooner if worsening.  Report to ED for the following: Please seek more urgent medical attention if you develop any of the following:  Chest pain  Shortness of breath  Bluish lips or face  Severe headache   Severe dizziness or passing out  New confusion  Inability to stay awake  Extreme weakness  If you have a pulse oximeter, check it at least daily. Seek urgent medical attention if it drops to 92% or below.

## 2024-05-14 NOTE — PROGRESS NOTES
[facility] Primary Care Virtualist Encounter Note       Chief Complaint   Patient presents with    Cough    Congestion    URI       HPI:    Courtney Taylor (:  1940) has requested an audio/video evaluation for the following concern(s):    This is an established patient of Noa Almanza. This is the first time I am seeing the patient today. She requested this visit for 4+ days of fatigue, headache, chills, cough, congestion, wheezing and SOBE. She has not tested at home for COVID. Per record review she is prone to chronic bronchitis. She is unsure if fever. She states she does cough up yellow sometimes. She does have frontal sinus pressure and headache. 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 She has only been using her Advair Disk (Fluticasone propionate/Salmeterol) Will order home COVID test and she will call with results. She would be candidate for  Molnupiravir  as she is on medications with drug interactions with Paxlovid. Denies CP. No N/V/D.     Review of Systems   Constitutional:  Positive for chills and fatigue. Negative for fever.   HENT:  Positive for congestion, postnasal drip, sinus pressure and sore throat. Negative for sinus pain and trouble swallowing.    Respiratory:  Positive for cough, shortness of breath (with exertion and cough intermittently) and wheezing. Negative for chest tightness.    Cardiovascular: Negative.    Gastrointestinal:  Negative for diarrhea, nausea and vomiting.   Genitourinary: Negative.    Musculoskeletal:  Positive for myalgias.   Neurological:  Positive for headaches. Negative for dizziness and weakness.       Prior to Visit Medications    Medication Sig Taking? Authorizing Provider   guaiFENesin (MUCINEX) 600 MG extended release tablet Take 1 tablet by mouth 2 times daily for 10 days Yes Agnes Hanson APRN - CNP   benzonatate (TESSALON) 100 MG capsule Take 1-2 capsules by mouth 3 times daily as needed for Cough Yes Agnes Hanson APRN - CNP

## 2024-05-17 ENCOUNTER — TELEPHONE (OUTPATIENT)
Dept: FAMILY MEDICINE CLINIC | Age: 84
End: 2024-05-17

## 2024-05-17 ENCOUNTER — OFFICE VISIT (OUTPATIENT)
Dept: FAMILY MEDICINE CLINIC | Age: 84
End: 2024-05-17

## 2024-05-17 ENCOUNTER — HOSPITAL ENCOUNTER (OUTPATIENT)
Dept: GENERAL RADIOLOGY | Age: 84
Discharge: HOME | End: 2024-05-17
Payer: MEDICARE

## 2024-05-17 VITALS
SYSTOLIC BLOOD PRESSURE: 130 MMHG | TEMPERATURE: 98.2 F | OXYGEN SATURATION: 96 % | DIASTOLIC BLOOD PRESSURE: 78 MMHG | WEIGHT: 226 LBS | BODY MASS INDEX: 44.14 KG/M2 | HEART RATE: 86 BPM

## 2024-05-17 DIAGNOSIS — J20.9 CHRONIC BRONCHITIS WITH ACUTE EXACERBATION (HCC): ICD-10-CM

## 2024-05-17 DIAGNOSIS — J22 LOWER RESPIRATORY INFECTION: Primary | ICD-10-CM

## 2024-05-17 DIAGNOSIS — J42 CHRONIC BRONCHITIS WITH ACUTE EXACERBATION (HCC): ICD-10-CM

## 2024-05-17 DIAGNOSIS — J22 LOWER RESPIRATORY INFECTION: ICD-10-CM

## 2024-05-17 LAB
INFLUENZA A ANTIBODY: NORMAL
INFLUENZA B ANTIBODY: NORMAL

## 2024-05-17 PROCEDURE — 71046 X-RAY EXAM CHEST 2 VIEWS: CPT

## 2024-05-17 RX ORDER — IPRATROPIUM BROMIDE AND ALBUTEROL SULFATE 2.5; .5 MG/3ML; MG/3ML
1 SOLUTION RESPIRATORY (INHALATION) ONCE
Status: COMPLETED | OUTPATIENT
Start: 2024-05-17 | End: 2024-05-17

## 2024-05-17 RX ORDER — ALBUTEROL SULFATE 2.5 MG/3ML
2.5 SOLUTION RESPIRATORY (INHALATION) EVERY 6 HOURS PRN
Qty: 120 EACH | Refills: 0 | Status: SHIPPED | OUTPATIENT
Start: 2024-05-17

## 2024-05-17 RX ORDER — LEVOFLOXACIN 500 MG/1
500 TABLET, FILM COATED ORAL DAILY
Qty: 7 TABLET | Refills: 0 | Status: SHIPPED | OUTPATIENT
Start: 2024-05-17 | End: 2024-05-24

## 2024-05-17 RX ADMIN — IPRATROPIUM BROMIDE AND ALBUTEROL SULFATE 1 DOSE: 2.5; .5 SOLUTION RESPIRATORY (INHALATION) at 16:07

## 2024-05-17 ASSESSMENT — ENCOUNTER SYMPTOMS
SHORTNESS OF BREATH: 1
DIARRHEA: 0
VOMITING: 0
WHEEZING: 0
SINUS PRESSURE: 0
NAUSEA: 0
SINUS PAIN: 0
SORE THROAT: 0

## 2024-05-17 NOTE — TELEPHONE ENCOUNTER
Patient called in stating she doesn't feel any better. She did a video visit with you on 5/14 for cough, congestion, URI. She is asking for another medication to help with symptoms. I let her know that she would most likely need to come in the office if symptoms aren't improving at all, she stated she has no way to get here. Please advise? Thanks Agnes!

## 2024-05-17 NOTE — PROGRESS NOTES
Courtney Taylor (:  1940) is a 84 y.o. female,Established patient, here for evaluation of the following chief complaint(s):  Cough (X 5 days), Congestion (X 5 days), and Chills      Assessment & Plan   ASSESSMENT/PLAN:  1. Lower respiratory infection  -     COVID-19  -     POCT Influenza A/B  -     ipratropium 0.5 mg-albuterol 2.5 mg (DUONEB) nebulizer solution 1 Dose; 1 Dose, Inhalation, ONCE, 1 dose, On 24 at 1630Initiate RT Bronchodilator Protocol: No  -     XR CHEST STANDARD (2 VW); Future  -     levoFLOXacin (LEVAQUIN) 500 MG tablet; Take 1 tablet by mouth daily for 7 days, Disp-7 tablet, R-0Normal  -     albuterol (PROVENTIL) (2.5 MG/3ML) 0.083% nebulizer solution; Take 3 mLs by nebulization every 6 hours as needed for Wheezing, Disp-120 each, R-0Normal  2. Chronic bronchitis with acute exacerbation (HCC)  -     XR CHEST STANDARD (2 VW); Future  -     levoFLOXacin (LEVAQUIN) 500 MG tablet; Take 1 tablet by mouth daily for 7 days, Disp-7 tablet, R-0Normal  -     albuterol (PROVENTIL) (2.5 MG/3ML) 0.083% nebulizer solution; Take 3 mLs by nebulization every 6 hours as needed for Wheezing, Disp-120 each, R-0Normal      --Rapid Flu negative, Covid pending  -CXR pending   -Patient with known CHF, bilateral non pitting edema noted, also diabetic, does not check sugars and has not had recently ordered bloodwork completed so unknown A1c. I hesitate to start oral steroids at this time.   -Tolerated in office breathing treatment well, given nebulizer machine for home use and educated along with her sister on it's use and directions for setting up. After breathing treatment sats improved to 96% with slight improvement in left lung fields.   -Will stop doxycycline and start levaquin. With unknown reaction to ceftriaxone would not want to start Augmentin.   -Covid test pending   -Incentive spirometer given and educated in use. Patient verbalized understanding   -Patient has labs pending from visit with PCP

## 2024-05-17 NOTE — TELEPHONE ENCOUNTER
Patient called the office, states that she never took a COVID test as one was not called into the pharmacy and she needs assistance on running it. Patient scheduled for an appt today.

## 2024-05-19 LAB — SARS-COV-2 N GENE RESP QL NAA+PROBE: NOT DETECTED

## 2024-05-20 ENCOUNTER — TELEPHONE (OUTPATIENT)
Dept: FAMILY MEDICINE CLINIC | Age: 84
End: 2024-05-20

## 2024-05-20 NOTE — TELEPHONE ENCOUNTER
Called the patient, and the patient stated that she was in on 5/17/24 and saw Angela DEGROOT, Patient stated that she was started on a new antibiotic Levofloxacin.  Patient does not recall ever taking the medication before.  Patient states that since she started the medication that she has started shakiness, trembling, unsteady on feet, headache, feels dizzy, feels like she is going crazy.  Patient denies fever, denies shortness of breath, denies chest pain.  Patient states that she has only voided one time yesterday after taking water pill she was able pee one time.  Patient states that she has only voided one time today as well after taking a water pill.  Patient denies chills, cold sweats.  Nurse informed the provider.  The provider advised the patient to stop taking the Levofloaxcin and to come in tomorrow 5/21/24 to have fasting labs drawn.  Patient advised that the labs have to be fasting but to make sure that she is drinking plenty of water.  Patient advised that if she develops any new or worsening symptoms to call the office or go to the ED for evaluation.  Patient verbalized understanding.

## 2024-05-20 NOTE — TELEPHONE ENCOUNTER
Received phone call from the pt, she stated that when she was in to see MIKAYLA Miller on 05/17/2024 an ABX was taken away and a new one was provided (Levaquin) pt stated that she is feeling better \"cold\" wise.     She stated that everytime she turns around she just wants to cry she feels unsafe with ambulating and walking she is very very jittery and wants to know what is causing this or what is going on. She stated that she has taken 4 of the pills she thinks.

## 2024-05-20 NOTE — TELEPHONE ENCOUNTER
Can we get more information? Did these symptoms start with the levaquin? Any other specific side effects?

## 2024-05-21 DIAGNOSIS — E89.0 POSTOPERATIVE HYPOTHYROIDISM: ICD-10-CM

## 2024-05-21 DIAGNOSIS — R80.9 TYPE 2 DIABETES MELLITUS WITH DIABETIC MICROALBUMINURIA, WITHOUT LONG-TERM CURRENT USE OF INSULIN (HCC): ICD-10-CM

## 2024-05-21 DIAGNOSIS — E78.5 HYPERLIPIDEMIA ASSOCIATED WITH TYPE 2 DIABETES MELLITUS (HCC): ICD-10-CM

## 2024-05-21 DIAGNOSIS — F03.A0 MILD DEMENTIA WITHOUT BEHAVIORAL DISTURBANCE, PSYCHOTIC DISTURBANCE, MOOD DISTURBANCE, OR ANXIETY, UNSPECIFIED DEMENTIA TYPE (HCC): ICD-10-CM

## 2024-05-21 DIAGNOSIS — M79.89 BILATERAL SWELLING OF FEET: ICD-10-CM

## 2024-05-21 DIAGNOSIS — E11.69 HYPERLIPIDEMIA ASSOCIATED WITH TYPE 2 DIABETES MELLITUS (HCC): ICD-10-CM

## 2024-05-21 DIAGNOSIS — E11.29 TYPE 2 DIABETES MELLITUS WITH DIABETIC MICROALBUMINURIA, WITHOUT LONG-TERM CURRENT USE OF INSULIN (HCC): ICD-10-CM

## 2024-05-21 DIAGNOSIS — E83.42 HYPOMAGNESEMIA: ICD-10-CM

## 2024-05-21 DIAGNOSIS — I10 DIABETES MELLITUS WITH COINCIDENT HYPERTENSION (HCC): ICD-10-CM

## 2024-05-21 DIAGNOSIS — R06.09 DYSPNEA ON EXERTION: ICD-10-CM

## 2024-05-21 DIAGNOSIS — E11.9 DIABETES MELLITUS WITH COINCIDENT HYPERTENSION (HCC): ICD-10-CM

## 2024-05-21 LAB
ALBUMIN SERPL-MCNC: 4 G/DL (ref 3.4–5)
ALBUMIN/GLOB SERPL: 1.7 {RATIO} (ref 1.1–2.2)
ALP SERPL-CCNC: 88 U/L (ref 40–129)
ALT SERPL-CCNC: 9 U/L (ref 10–40)
ANION GAP SERPL CALCULATED.3IONS-SCNC: 19 MMOL/L (ref 3–16)
AST SERPL-CCNC: 22 U/L (ref 15–37)
BILIRUB SERPL-MCNC: 0.3 MG/DL (ref 0–1)
BUN SERPL-MCNC: 29 MG/DL (ref 7–20)
CALCIUM SERPL-MCNC: 9.5 MG/DL (ref 8.3–10.6)
CHLORIDE SERPL-SCNC: 99 MMOL/L (ref 99–110)
CHOLEST SERPL-MCNC: 124 MG/DL (ref 0–199)
CO2 SERPL-SCNC: 21 MMOL/L (ref 21–32)
CREAT SERPL-MCNC: 1.5 MG/DL (ref 0.6–1.2)
FOLATE SERPL-MCNC: 11.84 NG/ML (ref 4.78–24.2)
GFR SERPLBLD CREATININE-BSD FMLA CKD-EPI: 34 ML/MIN/{1.73_M2}
GLUCOSE SERPL-MCNC: 84 MG/DL (ref 70–99)
HDLC SERPL-MCNC: 63 MG/DL (ref 40–60)
LDLC SERPL CALC-MCNC: 42 MG/DL
MAGNESIUM SERPL-MCNC: 1.8 MG/DL (ref 1.8–2.4)
NT-PROBNP SERPL-MCNC: 157 PG/ML (ref 0–449)
POTASSIUM SERPL-SCNC: 4.6 MMOL/L (ref 3.5–5.1)
PROT SERPL-MCNC: 6.4 G/DL (ref 6.4–8.2)
SODIUM SERPL-SCNC: 139 MMOL/L (ref 136–145)
T4 FREE SERPL-MCNC: 1.7 NG/DL (ref 0.9–1.8)
TRIGL SERPL-MCNC: 97 MG/DL (ref 0–150)
TSH SERPL DL<=0.005 MIU/L-ACNC: 1.31 UIU/ML (ref 0.27–4.2)
VIT B12 SERPL-MCNC: 1177 PG/ML (ref 211–911)
VLDLC SERPL CALC-MCNC: 19 MG/DL

## 2024-05-22 ENCOUNTER — NURSE ONLY (OUTPATIENT)
Dept: FAMILY MEDICINE CLINIC | Age: 84
End: 2024-05-22

## 2024-05-22 DIAGNOSIS — N28.9 ABNORMAL RENAL FUNCTION: Primary | ICD-10-CM

## 2024-05-22 DIAGNOSIS — R39.198 URINARY DYSFUNCTION: Primary | ICD-10-CM

## 2024-05-22 DIAGNOSIS — N18.32 STAGE 3B CHRONIC KIDNEY DISEASE (HCC): Primary | ICD-10-CM

## 2024-05-22 LAB
BILIRUBIN, POC: NORMAL
BLOOD URINE, POC: NORMAL
CLARITY, POC: CLEAR
COLOR, POC: YELLOW
EST. AVERAGE GLUCOSE BLD GHB EST-MCNC: 102.5 MG/DL
GLUCOSE URINE, POC: NORMAL
HBA1C MFR BLD: 5.2 %
KETONES, POC: NORMAL
LEUKOCYTE EST, POC: NORMAL
NITRITE, POC: NORMAL
PH, POC: 5.5
PROTEIN, POC: NORMAL
SPECIFIC GRAVITY, POC: >=1.03
UROBILINOGEN, POC: 0.2

## 2024-05-22 PROCEDURE — 81002 URINALYSIS NONAUTO W/O SCOPE: CPT | Performed by: PHYSICIAN ASSISTANT

## 2024-05-23 ENCOUNTER — TELEPHONE (OUTPATIENT)
Dept: FAMILY MEDICINE CLINIC | Age: 84
End: 2024-05-23

## 2024-05-23 DIAGNOSIS — Z59.9 FINANCIAL DIFFICULTY: Primary | ICD-10-CM

## 2024-05-23 DIAGNOSIS — N18.32 STAGE 3B CHRONIC KIDNEY DISEASE (HCC): ICD-10-CM

## 2024-05-23 LAB — BACTERIA UR CULT: NORMAL

## 2024-05-23 SDOH — ECONOMIC STABILITY - INCOME SECURITY: PROBLEM RELATED TO HOUSING AND ECONOMIC CIRCUMSTANCES, UNSPECIFIED: Z59.9

## 2024-05-23 NOTE — TELEPHONE ENCOUNTER
Please resend to correct pharmacy [facility] in [place]. Incorrect Pharmacy removed from patients chart. Thank you

## 2024-05-23 NOTE — TELEPHONE ENCOUNTER
Spoke to the patient's sister, Alessia, jardiance is $90. They are wondering if she qualifies for patient assistance. Referral placed for social work

## 2024-05-28 ENCOUNTER — TELEPHONE (OUTPATIENT)
Dept: FAMILY MEDICINE CLINIC | Age: 84
End: 2024-05-28

## 2024-05-28 DIAGNOSIS — G89.4 CHRONIC PAIN DISORDER: ICD-10-CM

## 2024-05-28 DIAGNOSIS — F03.A0 MILD DEMENTIA WITHOUT BEHAVIORAL DISTURBANCE, PSYCHOTIC DISTURBANCE, MOOD DISTURBANCE, OR ANXIETY, UNSPECIFIED DEMENTIA TYPE (HCC): Primary | ICD-10-CM

## 2024-05-28 DIAGNOSIS — M51.37 DEGENERATION OF LUMBAR OR LUMBOSACRAL INTERVERTEBRAL DISC: ICD-10-CM

## 2024-05-28 RX ORDER — GABAPENTIN 800 MG/1
800 TABLET ORAL 3 TIMES DAILY
Qty: 90 TABLET | Refills: 2 | Status: SHIPPED | OUTPATIENT
Start: 2024-05-28 | End: 2024-08-26

## 2024-05-28 NOTE — TELEPHONE ENCOUNTER
CHARLINE spoke with pt and expressed SW will be making referral to [facility] pharmacy and referral was also placed to [facility] for nursing and physical therapy.  SW informed pt how [facility] pharmacy will assist her in obtaining Jardiance.  Pt thanked CHARLINE for the assistance.  PLAN: CHARLINE plans to close out PCF referral but will assist pt as needed in the future.

## 2024-05-28 NOTE — TELEPHONE ENCOUNTER
SW spoke with pt and discussed pt having difficulty with cost of Jardiance.  SW inquired about pt's finances to see if pt would be eligible for BI C[employer] PAP.  SW reviewed pt's income and informed pt how her income is over 250% of FPL to be eligible for BI Cares PAP to obtain Jardiance.  Pt also related she is having difficulty knowing what medications to take when and she also has difficulty ambulating and with mobility.  Pt stated she would like to have in home nursing and physical therapy services through an agency.  PLAN: SW informed pt how would explore other options to see if pt can obtain assistance through [facility] and contact pt with any updates or information.

## 2024-05-28 NOTE — TELEPHONE ENCOUNTER
Refill Request - Controlled Substance    CONFIRM preferred pharmacy with the patient.    If Mail Order Rx - Pend for 90 day refill.        Last Seen Department: 5/17/2024  Last Seen by PCP: 4/4/2024    Last Written: 4/04/2024, #90, 0 refills    Last UDS: 6/06/2012    Med Agreement Signed On: none on file    If no future appointment scheduled:  Review the last OV with PCP and review information for follow-up visit,  Route STAFF MESSAGE with patient name to the  Pool for scheduling with the following information:            -  Timing of next visit           -  Visit type ie Physical, OV, etc           -  Diagnoses/Reason ie. COPD, HTN - Do not use MEDICATION, Follow-up or CHECK UP - Give reason for visit        Next Appointment:   Future Appointments   Date Time Provider Department Center   5/30/2024  2:30 PM Noa Cardoza PA [place]  [place] [facility] [place]   6/5/2024  2:00 PM Amelie Russo PA[facility]       Message sent to  to schedule appt with patient?  YES      Requested Prescriptions     Pending Prescriptions Disp Refills    gabapentin (NEURONTIN) 800 MG tablet [Pharmacy Med Name: GABAPENTIN 800 MG TABLET] 90 tablet 0     Sig: Take 1 tablet by mouth 3 times daily.

## 2024-05-28 NOTE — TELEPHONE ENCOUNTER
SW made 1st call attempt to reach patient to follow-up on [facility] referral from PCP.  SW was unable to reach pt and left message explaining reason for call.  SW left contact number asking for a return call.  PLAN: SW will attempt to reach pt another day if does not hear back from patient.

## 2024-05-29 DIAGNOSIS — N28.9 ABNORMAL RENAL FUNCTION: ICD-10-CM

## 2024-05-29 LAB
ALBUMIN SERPL-MCNC: 4 G/DL (ref 3.4–5)
ANION GAP SERPL CALCULATED.3IONS-SCNC: 9 MMOL/L (ref 3–16)
BUN SERPL-MCNC: 11 MG/DL (ref 7–20)
CALCIUM SERPL-MCNC: 9.2 MG/DL (ref 8.3–10.6)
CHLORIDE SERPL-SCNC: 106 MMOL/L (ref 99–110)
CO2 SERPL-SCNC: 29 MMOL/L (ref 21–32)
CREAT SERPL-MCNC: 0.6 MG/DL (ref 0.6–1.2)
GFR SERPLBLD CREATININE-BSD FMLA CKD-EPI: 89 ML/MIN/{1.73_M2}
GLUCOSE SERPL-MCNC: 82 MG/DL (ref 70–99)
PHOSPHATE SERPL-MCNC: 3.4 MG/DL (ref 2.5–4.9)
POTASSIUM SERPL-SCNC: 4.7 MMOL/L (ref 3.5–5.1)
SODIUM SERPL-SCNC: 144 MMOL/L (ref 136–145)

## 2024-06-03 ENCOUNTER — OFFICE VISIT (OUTPATIENT)
Dept: FAMILY MEDICINE CLINIC | Age: 84
End: 2024-06-03
Payer: MEDICARE

## 2024-06-03 VITALS
HEIGHT: 60 IN | SYSTOLIC BLOOD PRESSURE: 128 MMHG | DIASTOLIC BLOOD PRESSURE: 82 MMHG | HEART RATE: 86 BPM | BODY MASS INDEX: 44.76 KG/M2 | WEIGHT: 228 LBS | OXYGEN SATURATION: 93 %

## 2024-06-03 DIAGNOSIS — Z00.00 INITIAL MEDICARE ANNUAL WELLNESS VISIT: Primary | ICD-10-CM

## 2024-06-03 PROCEDURE — 1123F ACP DISCUSS/DSCN MKR DOCD: CPT | Performed by: PHYSICIAN ASSISTANT

## 2024-06-03 PROCEDURE — 3074F SYST BP LT 130 MM HG: CPT | Performed by: PHYSICIAN ASSISTANT

## 2024-06-03 PROCEDURE — 3079F DIAST BP 80-89 MM HG: CPT | Performed by: PHYSICIAN ASSISTANT

## 2024-06-03 PROCEDURE — G0438 PPPS, INITIAL VISIT: HCPCS | Performed by: PHYSICIAN ASSISTANT

## 2024-06-03 ASSESSMENT — PATIENT HEALTH QUESTIONNAIRE - PHQ9
SUM OF ALL RESPONSES TO PHQ QUESTIONS 1-9: 0
3. TROUBLE FALLING OR STAYING ASLEEP: NOT AT ALL
SUM OF ALL RESPONSES TO PHQ QUESTIONS 1-9: 0
2. FEELING DOWN, DEPRESSED OR HOPELESS: NOT AT ALL
DEPRESSION UNABLE TO ASSESS: FUNCTIONAL CAPACITY MOTIVATION LIMITS ACCURACY
1. LITTLE INTEREST OR PLEASURE IN DOING THINGS: NOT AT ALL
8. MOVING OR SPEAKING SO SLOWLY THAT OTHER PEOPLE COULD HAVE NOTICED. OR THE OPPOSITE, BEING SO FIGETY OR RESTLESS THAT YOU HAVE BEEN MOVING AROUND A LOT MORE THAN USUAL: NOT AT ALL
SUM OF ALL RESPONSES TO PHQ QUESTIONS 1-9: 0
SUM OF ALL RESPONSES TO PHQ QUESTIONS 1-9: 0
7. TROUBLE CONCENTRATING ON THINGS, SUCH AS READING THE NEWSPAPER OR WATCHING TELEVISION: NOT AT ALL
10. IF YOU CHECKED OFF ANY PROBLEMS, HOW DIFFICULT HAVE THESE PROBLEMS MADE IT FOR YOU TO DO YOUR WORK, TAKE CARE OF THINGS AT HOME, OR GET ALONG WITH OTHER PEOPLE: NOT DIFFICULT AT ALL
4. FEELING TIRED OR HAVING LITTLE ENERGY: NOT AT ALL
9. THOUGHTS THAT YOU WOULD BE BETTER OFF DEAD, OR OF HURTING YOURSELF: NOT AT ALL
SUM OF ALL RESPONSES TO PHQ9 QUESTIONS 1 & 2: 0
6. FEELING BAD ABOUT YOURSELF - OR THAT YOU ARE A FAILURE OR HAVE LET YOURSELF OR YOUR FAMILY DOWN: NOT AT ALL
5. POOR APPETITE OR OVEREATING: NOT AT ALL

## 2024-06-03 ASSESSMENT — LIFESTYLE VARIABLES
HOW OFTEN DO YOU HAVE A DRINK CONTAINING ALCOHOL: NEVER
HOW MANY STANDARD DRINKS CONTAINING ALCOHOL DO YOU HAVE ON A TYPICAL DAY: PATIENT DOES NOT DRINK

## 2024-06-03 NOTE — PATIENT INSTRUCTIONS
nuts, seeds, and soy products.     Limit drinks and foods with added sugar. These include candy, desserts, and soda pop.   Heart-healthy lifestyle    If your doctor recommends it, get more exercise. For many people, walking is a good choice. Or you may want to swim, bike, or do other activities. Bit by bit, increase the time you're active every day. Try for at least 30 minutes on most days of the week.     Try to quit or cut back on using tobacco and other nicotine products. This includes smoking and vaping. If you need help quitting, talk to your doctor about stop-smoking programs and medicines. These can increase your chances of quitting for good. Quitting is one of the most important things you can do to protect your heart. It is never too late to quit. Try to avoid secondhand smoke too.     Stay at a weight that's healthy for you. Talk to your doctor if you need help losing weight.     Try to get 7 to 9 hours of sleep each night.     Limit alcohol to 2 drinks a day for men and 1 drink a day for women. Too much alcohol can cause health problems.     Manage other health problems such as diabetes, high blood pressure, and high cholesterol. If you think you may have a problem with alcohol or drug use, talk to your doctor.   Medicines    Take your medicines exactly as prescribed. Call your doctor if you think you are having a problem with your medicine.     If your doctor recommends aspirin, take the amount directed each day. Make sure you take aspirin and not another kind of pain reliever, such as acetaminophen (Tylenol).   When should you call for help?   Call 911 if you have symptoms of a heart attack. These may include:    Chest pain or pressure, or a strange feeling in the chest.     Sweating.     Shortness of breath.     Pain, pressure, or a strange feeling in the back, neck, jaw, or upper belly or in one or both shoulders or arms.     Lightheadedness or sudden weakness.     A fast or irregular heartbeat.

## 2024-06-04 NOTE — TELEPHONE ENCOUNTER
Okay, glad to know that. She does have alzheimer's. She's new to me but she seems very lucid in appointments.

## 2024-06-04 NOTE — TELEPHONE ENCOUNTER
Pt contacted CHARLINE after SW had left message for pt this morning.  CHARLINE explained how CHARLINE and Hilaria from  [facility] had been trying to reach her about setting up home care services.  Pt apologized and stated she had been sick and not feeling well.  CHARLINE assisted in including Hilaria from [facility] in on the call.  Hilaria at [facility] was discussing setting up  a date to go out and see the patient for nursing and PT when pt related she already had [facility] involved providing PT and OT through her pain management doctor.  Hilaria at [facility] was able to call [facility] to verify these services and inform agency how pt also has a nursing order from PCP to provide nursing as well. PLAN: Pt aware home care services (Nursing, PT and OT)  will be provided and continue with [facility] agency. And PCP will be informed.

## 2024-06-04 NOTE — PROGRESS NOTES
She does have dementia so it is likely that is a contributing factor  
Rob Caban, APRN - CNP       CareTeam (Including outside providers/suppliers regularly involved in providing care):   Patient Care Team:  Noa Cardoza PA as PCP - General (Physician Assistant)  Noa Cardoza PA as PCP - Empaneled Provider  Savanah Oliveira MD as Anesthesiologist (Pain Management)  Jay Villa (Orthotics Supplier)  Heidy De Santiago LSW as  (Licensed Clinical )     Reviewed and updated this visit:  Tobacco  Allergies  Meds  Med Hx  Surg Hx  Soc Hx  Fam Hx

## 2024-06-04 NOTE — TELEPHONE ENCOUNTER
SW placed call to pt with Hilaria at [facility].  SW left message asking pt to return call to SW explaining how Hilaria had tried to call her three times last week. Hilaria from [facility] also asked pt to contact her back at [phone].  PLAN: Hilaria also related she would try and contact pt's son and sister to reach patient and inform SW once makes contact with the patient.

## 2024-06-17 ENCOUNTER — TELEPHONE (OUTPATIENT)
Age: 84
End: 2024-06-17

## 2024-06-17 NOTE — TELEPHONE ENCOUNTER
Pt called back.  She does not remember when or where she had testing.  The DME she used was [facility] in [place], [place] [phone].    I called [facility] and s/w Andreea.  Patient had a sleep study done I believe in October 2024.  Andreea said she would fax the report over.

## 2024-06-17 NOTE — TELEPHONE ENCOUNTER
Per appt note, pt had previous sleep study in [place].  Called pt and L/M asking for return call.     Approx when did she have the sleep study?  Does she know where it was done?  Who is the DME she got machine through?

## 2024-06-27 ENCOUNTER — OFFICE VISIT (OUTPATIENT)
Age: 84
End: 2024-06-27
Payer: MEDICARE

## 2024-06-27 ENCOUNTER — TELEPHONE (OUTPATIENT)
Dept: PULMONOLOGY | Age: 84
End: 2024-06-27

## 2024-06-27 VITALS
RESPIRATION RATE: 24 BRPM | HEIGHT: 60 IN | SYSTOLIC BLOOD PRESSURE: 122 MMHG | OXYGEN SATURATION: 95 % | WEIGHT: 225 LBS | HEART RATE: 72 BPM | DIASTOLIC BLOOD PRESSURE: 64 MMHG | BODY MASS INDEX: 44.17 KG/M2

## 2024-06-27 DIAGNOSIS — G89.4 CHRONIC PAIN DISORDER: Chronic | ICD-10-CM

## 2024-06-27 DIAGNOSIS — G47.31 CENTRAL SLEEP APNEA: ICD-10-CM

## 2024-06-27 DIAGNOSIS — G47.31 COMPLEX SLEEP APNEA SYNDROME: ICD-10-CM

## 2024-06-27 DIAGNOSIS — G47.33 SEVERE OBSTRUCTIVE SLEEP APNEA: Primary | ICD-10-CM

## 2024-06-27 PROBLEM — E11.69 HYPERLIPIDEMIA ASSOCIATED WITH TYPE 2 DIABETES MELLITUS (HCC): Chronic | Status: ACTIVE | Noted: 2024-04-04

## 2024-06-27 PROBLEM — E78.5 HYPERLIPIDEMIA ASSOCIATED WITH TYPE 2 DIABETES MELLITUS (HCC): Chronic | Status: ACTIVE | Noted: 2024-04-04

## 2024-06-27 PROCEDURE — 3078F DIAST BP <80 MM HG: CPT

## 2024-06-27 PROCEDURE — 99204 OFFICE O/P NEW MOD 45 MIN: CPT

## 2024-06-27 PROCEDURE — 3074F SYST BP LT 130 MM HG: CPT

## 2024-06-27 PROCEDURE — 1123F ACP DISCUSS/DSCN MKR DOCD: CPT

## 2024-06-27 ASSESSMENT — SLEEP AND FATIGUE QUESTIONNAIRES
HOW LIKELY ARE YOU TO NOD OFF OR FALL ASLEEP WHILE SITTING AND READING: HIGH CHANCE OF DOZING
ESS TOTAL SCORE: 12
HOW LIKELY ARE YOU TO NOD OFF OR FALL ASLEEP WHILE SITTING AND TALKING TO SOMEONE: SLIGHT CHANCE OF DOZING
HOW LIKELY ARE YOU TO NOD OFF OR FALL ASLEEP WHILE LYING DOWN TO REST IN THE AFTERNOON WHEN CIRCUMSTANCES PERMIT: MODERATE CHANCE OF DOZING
HOW LIKELY ARE YOU TO NOD OFF OR FALL ASLEEP WHILE WATCHING TV: HIGH CHANCE OF DOZING
NECK CIRCUMFERENCE (INCHES): 16
HOW LIKELY ARE YOU TO NOD OFF OR FALL ASLEEP WHILE SITTING INACTIVE IN A PUBLIC PLACE: WOULD NEVER DOZE
HOW LIKELY ARE YOU TO NOD OFF OR FALL ASLEEP WHEN YOU ARE A PASSENGER IN A CAR FOR AN HOUR WITHOUT A BREAK: WOULD NEVER DOZE
HOW LIKELY ARE YOU TO NOD OFF OR FALL ASLEEP IN A CAR, WHILE STOPPED FOR A FEW MINUTES IN TRAFFIC: WOULD NEVER DOZE
HOW LIKELY ARE YOU TO NOD OFF OR FALL ASLEEP WHILE SITTING QUIETLY AFTER LUNCH WITHOUT ALCOHOL: HIGH CHANCE OF DOZING

## 2024-06-27 NOTE — PATIENT INSTRUCTIONS
nap, sleep 30 minutes or less before 3 pm.  Have a fixed bedtime and awakening time. The human body thrives on routines. Only deviate from these set times by no more than 1 hour, including on weekends.  Avoid exposure to electronics/screens within 2 hours of your desired sleep time. Light from screens inhibits melatonin production which stops our brain from helping us get to sleep.   Go to bed only when sleepy; this reduces the time you are awake in bed (which can lead to frustration and negative thoughts about sleep). If you can't fall asleep within 15-30 minutes, get up and do something boring until you feel sleepy again. Absolutely no electronic screens during this time.    Only use your bed for sleeping & intimacy. Do not use your bed as an office, workroom or recreation room.    Develop sleep rituals that you go through the same way every night.  Stay away from stimulants such as caffeine and nicotine. Caffeine can remain in your system for well over 10 hours, so no caffeine after lunch. Caffeine is found in tea, cola, coffee, cocoa and chocolate.    Avoid alcohol 2-4 hours before bedtime. As alcohol is metabolized, the result is a stimulant or \"wake-up\" effect and will cause fragmented sleep.   Regular exercise is recommended to help you deepen your sleep (and MANY other reasons), but timing is important--aim to exercise in the morning or early afternoon, not within 4 hours of your bedtime.   Don’t take worries to bed. Leave worries about life, work, school etc. behind you when you go to bed.    Ensure your bedroom is quiet and comfortable. A cool, dark room is recommended. A white noise machine can help eliminate awakenings due to sounds.    ______________________________________________________________________      List of Medical Supply Companies / \"DME\" companies:    SUPPLIER TELEPHONE FAX           ADDRESS   [facility]  [phone] [address] [place], [place]

## 2024-06-27 NOTE — PROGRESS NOTES
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/2011    Fall     Fibromyalgia     GERD (gastroesophageal reflux disease)     Hearing loss     [place] (hard of hearing)     Hypertension     Infection     RIGHT ANKLE    Liver disease     fatty tissue on liver    MRSA (methicillin resistant staph aureus) culture positive 5/8/12; 5/2/12,9/19/12    Ankle    MRSA (methicillin resistant staph aureus) culture positive 10/8/13    ankle    Nausea & vomiting     PONV    Renal failure     acute renal failure on a previous admission    Septic shock(785.52)     Sleep apnea     does not use cpap    Tailbone injury FEB, 2013    Thyroid disease      Past Surgical History:   Procedure Laterality Date    ABDOMEN SURGERY  2004    Lap Band    ANKLE SURGERY  5-2-2012    Incision, irrigation and debridement right ankle    ANKLE SURGERY  5/4/12    incision and drainage of right ankle    ANKLE SURGERY  5/7/12    incision and drainage right foot.    ANKLE SURGERY Right 10/10/2013    INCISION AND DEBRIDEMENT WITH REMOVAL OF PROSTHESIS RIGHT ANKLE WITH INSERTION OF ANTIBIOTIC SPACER    ANKLE SURGERY Right 1/2/13    RIGHT ANKLE REMOVAL OF ANTIBIOTIC BEADS, TIBIOTALOCALCANEAL FUSION , PLATELET RICH PLASMA INJECTION AND FEMORAL HEAD ALLOGRAFT WITH MINI C ARM BIOMET    BACK SURGERY      Cervical sx    COLONOSCOPY      DEBRIDEMENT  12/04/12    incision and debridement right ankle with application of Acell graft and block for pain control    ENDOSCOPY, COLON, DIAGNOSTIC      JOINT REPLACEMENT      Bilat knees    OTHER SURGICAL HISTORY  4/5/2012    INCISION AND DEBRIDEMENT RIGHT ANKLE WITH APPLICATION OF WOUND VAC    OTHER SURGICAL HISTORY  05/10/2012    i & d right ankle    OTHER SURGICAL HISTORY  05/14/12    incsion and debridement right ankle with placement of wound vac    OTHER SURGICAL

## 2024-07-01 ENCOUNTER — TELEPHONE (OUTPATIENT)
Dept: FAMILY MEDICINE CLINIC | Age: 84
End: 2024-07-01

## 2024-07-01 DIAGNOSIS — F41.9 ANXIETY: Primary | ICD-10-CM

## 2024-07-01 RX ORDER — ALPRAZOLAM 0.5 MG/1
TABLET ORAL
Qty: 1 TABLET | Refills: 0 | Status: SHIPPED | OUTPATIENT
Start: 2024-07-01 | End: 2024-07-05

## 2024-07-01 NOTE — TELEPHONE ENCOUNTER
Pt called back and gave the name of a prosthetic company on [address] [phone].  Pt decided to choose a DME company off the list and will have them check with her insurance on coverage.  Orders with testing and visit note sent to [facility]. Notified PSS. Reminder message made to follow status of setup to make appropriate 31-90 day appt.

## 2024-07-01 NOTE — TELEPHONE ENCOUNTER
Patient called stating her pain doctor ordered a MRI for her scheduled for tomorrow, and needing something to help her relax due to being claustrophobic, she is unable to reach the doctor at this time, the office recommend her to reach out to her PCP and see if a medication can be prescribed.     Please advise.  Thanks  Kelsy ASHFORD LPN

## 2024-07-01 NOTE — TELEPHONE ENCOUNTER
I could send in a low dose of xanax for her to take. She may have some drowsiness with this medication. Has she taken this before?

## 2024-07-01 NOTE — TELEPHONE ENCOUNTER
Spoke with pt whom states that she has a list that her insurance gave her a list, but is on her phone and she will need to call back with the info.

## 2024-07-02 ENCOUNTER — HOSPITAL ENCOUNTER (OUTPATIENT)
Dept: MRI IMAGING | Age: 84
Discharge: HOME | End: 2024-07-02
Attending: ANESTHESIOLOGY
Payer: MEDICARE

## 2024-07-02 DIAGNOSIS — M96.1 POSTLAMINECTOMY SYNDROME, NOT ELSEWHERE CLASSIFIED: ICD-10-CM

## 2024-07-02 PROCEDURE — 72148 MRI LUMBAR SPINE W/O DYE: CPT

## 2024-07-06 DIAGNOSIS — F39 MOOD DISORDER (HCC): ICD-10-CM

## 2024-07-06 NOTE — TELEPHONE ENCOUNTER
Refill Request     CONFIRM preferred pharmacy with the patient.    If Mail Order Rx - Pend for 90 day refill.      Last Seen: Last Seen Department: 6/3/2024  Last Seen by PCP: 6/3/2024    Last Written: 4/4/2024    If no future appointment scheduled:  Review the last OV with PCP and review information for follow-up visit,  Route STAFF MESSAGE with patient name to the  Pool for scheduling with the following information:            -  Timing of next visit           -  Visit type ie Physical, OV, etc           -  Diagnoses/Reason ie. COPD, HTN - Do not use MEDICATION, Follow-up or CHECK UP - Give reason for visit      Next Appointment:   Future Appointments   Date Time Provider Department Center   12/3/2024 11:30 AM Noa Cardoza PA [place]  [place] - [place]       Message sent to  to schedule appt with patient?  NO      Requested Prescriptions     Pending Prescriptions Disp Refills    citalopram (CELEXA) 20 MG tablet [Pharmacy Med Name: CITALOPRAM HBR 20 MG TABLET] 30 tablet 0     Sig: TAKE 1 TABLET BY MOUTH DAILY

## 2024-07-08 RX ORDER — CITALOPRAM 20 MG/1
20 TABLET ORAL DAILY
Qty: 90 TABLET | Refills: 1 | Status: SHIPPED | OUTPATIENT
Start: 2024-07-08

## 2024-07-12 ENCOUNTER — TELEPHONE (OUTPATIENT)
Age: 84
End: 2024-07-12

## 2024-07-25 ENCOUNTER — TELEPHONE (OUTPATIENT)
Dept: PULMONOLOGY | Age: 84
End: 2024-07-25

## 2024-07-25 NOTE — TELEPHONE ENCOUNTER
Please notify patient of this.  I know she has completed several sleep studies over the past decade between several facilities.  If she is able to provide us with the name and information of another facility she's been to (or the records themselves) then maybe we could try again using a different study.     Otherwise, she could perform an HST to proceed

## 2024-07-25 NOTE — TELEPHONE ENCOUNTER
Received email from Sherri ([facility]) regarding her CPAP orders.  See below:    [place]  Patient:  Courtney Taylor   1940  Provider - Amelie Russo    We are Unable to obtain a signed diagnostic sleep study. The lab that she had this done does not have a signed copy.  In order for us to move forward we will have to have a signed diagnostic.  She will have to repeat an HST.    Questions please let me know,  Thank you   Sherri Mak  [facility]      Please advise next step.

## 2024-07-30 NOTE — TELEPHONE ENCOUNTER
I called and s/w patient.  She said she thinks she has a signed sleep study from 2022 that she can have her son bring up to the [facility] office some time this week.     She states she is also going to call the [facility] where she had her sleep study done in Oct 2023 and find out why they won't sign the sleep study.  If she can get that one signed, she will have them fax that to our office as well.    MA notified to keep a watch out for this.

## 2024-07-31 ENCOUNTER — OFFICE VISIT (OUTPATIENT)
Dept: FAMILY MEDICINE CLINIC | Age: 84
End: 2024-07-31
Payer: MEDICARE

## 2024-07-31 ENCOUNTER — HOSPITAL ENCOUNTER (OUTPATIENT)
Dept: CT IMAGING | Age: 84
Discharge: HOME | End: 2024-07-31
Payer: MEDICARE

## 2024-07-31 VITALS
BODY MASS INDEX: 42.38 KG/M2 | HEART RATE: 77 BPM | SYSTOLIC BLOOD PRESSURE: 118 MMHG | WEIGHT: 217 LBS | OXYGEN SATURATION: 94 % | DIASTOLIC BLOOD PRESSURE: 80 MMHG

## 2024-07-31 DIAGNOSIS — R29.898 WEAKNESS OF BOTH LOWER EXTREMITIES: ICD-10-CM

## 2024-07-31 DIAGNOSIS — M48.061 SPINAL STENOSIS OF LUMBAR REGION, UNSPECIFIED WHETHER NEUROGENIC CLAUDICATION PRESENT: ICD-10-CM

## 2024-07-31 DIAGNOSIS — R10.84 GENERALIZED ABDOMINAL PAIN: ICD-10-CM

## 2024-07-31 DIAGNOSIS — R30.0 BURNING WITH URINATION: Primary | ICD-10-CM

## 2024-07-31 DIAGNOSIS — B37.31 VAGINAL YEAST INFECTION: ICD-10-CM

## 2024-07-31 LAB
BILIRUBIN, POC: NORMAL
BLOOD URINE, POC: NORMAL
CLARITY, POC: CLEAR
COLOR, POC: YELLOW
GLUCOSE URINE, POC: 500
KETONES, POC: NORMAL
LEUKOCYTE EST, POC: NORMAL
NITRITE, POC: NORMAL
PERFORMED ON: NORMAL
PH, POC: 6
POC CREATININE: 0.9 MG/DL (ref 0.6–1.2)
POC SAMPLE TYPE: NORMAL
PROTEIN, POC: NORMAL
SPECIFIC GRAVITY, POC: 1.01
UROBILINOGEN, POC: 0.2

## 2024-07-31 PROCEDURE — 6360000004 HC RX CONTRAST MEDICATION: Performed by: PHYSICIAN ASSISTANT

## 2024-07-31 PROCEDURE — 81002 URINALYSIS NONAUTO W/O SCOPE: CPT | Performed by: PHYSICIAN ASSISTANT

## 2024-07-31 PROCEDURE — 1123F ACP DISCUSS/DSCN MKR DOCD: CPT | Performed by: PHYSICIAN ASSISTANT

## 2024-07-31 PROCEDURE — 74177 CT ABD & PELVIS W/CONTRAST: CPT

## 2024-07-31 PROCEDURE — 3079F DIAST BP 80-89 MM HG: CPT | Performed by: PHYSICIAN ASSISTANT

## 2024-07-31 PROCEDURE — 82565 ASSAY OF CREATININE: CPT

## 2024-07-31 PROCEDURE — 3074F SYST BP LT 130 MM HG: CPT | Performed by: PHYSICIAN ASSISTANT

## 2024-07-31 PROCEDURE — 99214 OFFICE O/P EST MOD 30 MIN: CPT | Performed by: PHYSICIAN ASSISTANT

## 2024-07-31 RX ORDER — FLUCONAZOLE 150 MG/1
150 TABLET ORAL ONCE
Qty: 1 TABLET | Refills: 0 | Status: SHIPPED | OUTPATIENT
Start: 2024-07-31 | End: 2024-07-31

## 2024-07-31 RX ORDER — DOCUSATE SODIUM 100 MG/1
100 CAPSULE, LIQUID FILLED ORAL 2 TIMES DAILY
COMMUNITY

## 2024-07-31 RX ADMIN — IOPAMIDOL 75 ML: 755 INJECTION, SOLUTION INTRAVENOUS at 14:41

## 2024-07-31 ASSESSMENT — ENCOUNTER SYMPTOMS
ABDOMINAL PAIN: 1
BACK PAIN: 1
VOMITING: 0
BLOOD IN STOOL: 0
ABDOMINAL DISTENTION: 0
COLOR CHANGE: 0
WHEEZING: 0
DIARRHEA: 0
SHORTNESS OF BREATH: 0
NAUSEA: 0

## 2024-07-31 NOTE — PROGRESS NOTES
motorized scooter is necessary due to the patient's unsteady gait, upper body weakness, and inability to  and ambulation device. These tasks cannot be completed with a lesser ambulation device such as a cane, crutch, or walker rollator.  The need for this equipment was discussed with the patient and she understands and is in agreement.       Review of Systems   Constitutional:  Positive for appetite change.   Respiratory:  Negative for shortness of breath and wheezing.    Cardiovascular:  Negative for chest pain, palpitations and leg swelling.   Gastrointestinal:  Positive for abdominal pain. Negative for abdominal distention, blood in stool, diarrhea, nausea and vomiting.   Genitourinary:  Positive for dysuria and vaginal pain. Negative for difficulty urinating, frequency, urgency, vaginal bleeding and vaginal discharge.   Musculoskeletal:  Positive for back pain and myalgias.   Skin:  Negative for color change.   Neurological:  Positive for weakness and numbness.          Objective   Physical Exam  Vitals reviewed.   Constitutional:       Appearance: Normal appearance.   Cardiovascular:      Rate and Rhythm: Normal rate and regular rhythm.      Heart sounds: Normal heart sounds.   Pulmonary:      Effort: Pulmonary effort is normal.      Breath sounds: Normal breath sounds.   Neurological:      Mental Status: She is alert.      Motor: Weakness present.      Gait: Gait abnormal.      Comments: Unable to complete the timed get up and go test as she needs to push up to stand due to leg weakness                An electronic signature was used to authenticate this note.    --ANA Randle

## 2024-08-01 LAB
ALBUMIN SERPL-MCNC: 3.8 G/DL (ref 3.4–5)
ALP SERPL-CCNC: 86 U/L (ref 40–129)
ALT SERPL-CCNC: 9 U/L (ref 10–40)
AST SERPL-CCNC: 19 U/L (ref 15–37)
BACTERIA UR CULT: NORMAL
BILIRUB DIRECT SERPL-MCNC: <0.2 MG/DL (ref 0–0.3)
BILIRUB INDIRECT SERPL-MCNC: ABNORMAL MG/DL (ref 0–1)
BILIRUB SERPL-MCNC: 0.3 MG/DL (ref 0–1)
PROT SERPL-MCNC: 6.1 G/DL (ref 6.4–8.2)

## 2024-08-01 NOTE — TELEPHONE ENCOUNTER
Pt dropped off a signed split night study from 4/15/22 at  [place] office-scanned in pt chart for record.  Faxed this result with pap orders, ov and insurance to [facility] via [employer] at [phone] to proceed with new PAP setup.  Notified [facility].    Reminder message made to follow up on set up status.     Of note, pt left a message with the sleep study result she brought in, indicating that if office faxes to Damaris Barber requesting sleep study to be signed, they should send results.  Office and [facility] attempted to get the signed sleep study.

## 2024-08-02 ENCOUNTER — TELEPHONE (OUTPATIENT)
Dept: FAMILY MEDICINE CLINIC | Age: 84
End: 2024-08-02

## 2024-08-02 ENCOUNTER — OFFICE VISIT (OUTPATIENT)
Dept: FAMILY MEDICINE CLINIC | Age: 84
End: 2024-08-02
Payer: MEDICARE

## 2024-08-02 VITALS
SYSTOLIC BLOOD PRESSURE: 102 MMHG | OXYGEN SATURATION: 91 % | HEART RATE: 85 BPM | DIASTOLIC BLOOD PRESSURE: 62 MMHG | WEIGHT: 219.4 LBS | BODY MASS INDEX: 42.85 KG/M2

## 2024-08-02 DIAGNOSIS — N94.9 VAGINAL BURNING: Primary | ICD-10-CM

## 2024-08-02 DIAGNOSIS — B37.9 YEAST INFECTION: ICD-10-CM

## 2024-08-02 LAB
BILIRUBIN, POC: NORMAL
BLOOD URINE, POC: NORMAL
CLARITY, POC: CLEAR
COLOR, POC: YELLOW
GLUCOSE URINE, POC: NORMAL
KETONES, POC: NORMAL
LEUKOCYTE EST, POC: NORMAL
NITRITE, POC: NORMAL
PH, POC: 5.5
PROTEIN, POC: NORMAL
SPECIFIC GRAVITY, POC: 1.01
UROBILINOGEN, POC: 0.2

## 2024-08-02 PROCEDURE — 99213 OFFICE O/P EST LOW 20 MIN: CPT | Performed by: NURSE PRACTITIONER

## 2024-08-02 PROCEDURE — 81002 URINALYSIS NONAUTO W/O SCOPE: CPT | Performed by: NURSE PRACTITIONER

## 2024-08-02 PROCEDURE — 1123F ACP DISCUSS/DSCN MKR DOCD: CPT | Performed by: NURSE PRACTITIONER

## 2024-08-02 PROCEDURE — 3078F DIAST BP <80 MM HG: CPT | Performed by: NURSE PRACTITIONER

## 2024-08-02 PROCEDURE — 3074F SYST BP LT 130 MM HG: CPT | Performed by: NURSE PRACTITIONER

## 2024-08-02 RX ORDER — FLUCONAZOLE 150 MG/1
150 TABLET ORAL ONCE
Qty: 2 TABLET | Refills: 0 | Status: SHIPPED | OUTPATIENT
Start: 2024-08-02 | End: 2024-08-02

## 2024-08-02 ASSESSMENT — ENCOUNTER SYMPTOMS
CONSTIPATION: 0
BLOOD IN STOOL: 0
NAUSEA: 0
RECTAL PAIN: 0
BACK PAIN: 0
DIARRHEA: 0
VOMITING: 0
ABDOMINAL PAIN: 1

## 2024-08-02 NOTE — TELEPHONE ENCOUNTER
Patient called the office stating that she can't get in to see the gynecologist until 10/17/24. Patient would be willing to go to [facility] or [facility] if they could see her sooner. Please advise, thanks.

## 2024-08-02 NOTE — PROGRESS NOTES
Courtney Taylor (:  1940) is a 84 y.o. female,Established patient, here for evaluation of the following chief complaint(s):  Burning sensation vaginal area \"all the time\"      Assessment & Plan   ASSESSMENT/PLAN:  1. Vaginal burning  -     Cecilia - Selena Ocampo, DO, Gynecology, [facility]  -     VAGINAL PATHOGENS PROBE *A  -     fluconazole (DIFLUCAN) 150 MG tablet; Take 1 tablet by mouth once for 1 dose Repeat in 72 hours if needed for one dose, Disp-2 tablet, R-0Normal  -     POCT Urinalysis no Micro  2. Yeast infection  -     VAGINAL PATHOGENS PROBE *A    -thick white discharge noted around vulva with redness and irritation of the skin. No white streaks or signs of lichens   -Will repeat diflucan with two doses while waiting on swab   -Reviewed allergies, discussed medication risks, benefits, side effects. Take medication with food.   -Reviewed symptom management   -Reviewed alarm symptoms    Patient Instructions   Wear loose cotton clothing. Don't wear nylon or other fabric that holds body heat and moisture close to the skin   Try sleeping without underwear   Don't scratch. Relieve itching with a cold pack or a cool bath  Don't wash your vulva more than once a day. Use plain water or a mild, unscented soap. Air-dry the vulva.  Change out of wet or damp clothes as soon as possible.  If you are using a vaginal medicine, don't have sex until you have finished your treatment. But if you do have sex, don't depend on a condom or diaphragm for birth control. The oil in some vaginal medicines weakens latex.  Don't douche or use powders, sprays, or perfumes in your vagina or on your vulva. These items can change the normal balance of organisms in your vagina.  Don't use tampons while using a vaginal cream or suppository. The tampons can absorb the medicine. Use pads instead.      Return if symptoms worsen or fail to improve.         Subjective   SUBJECTIVE/OBJECTIVE:  Reports vaginal burning \"for a long time\" 
Statement Selected

## 2024-08-02 NOTE — PATIENT INSTRUCTIONS
Wear loose cotton clothing. Don't wear nylon or other fabric that holds body heat and moisture close to the skin   Try sleeping without underwear   Don't scratch. Relieve itching with a cold pack or a cool bath  Don't wash your vulva more than once a day. Use plain water or a mild, unscented soap. Air-dry the vulva.  Change out of wet or damp clothes as soon as possible.  If you are using a vaginal medicine, don't have sex until you have finished your treatment. But if you do have sex, don't depend on a condom or diaphragm for birth control. The oil in some vaginal medicines weakens latex.  Don't douche or use powders, sprays, or perfumes in your vagina or on your vulva. These items can change the normal balance of organisms in your vagina.  Don't use tampons while using a vaginal cream or suppository. The tampons can absorb the medicine. Use pads instead.

## 2024-08-03 LAB
CANDIDA DNA VAG QL NAA+PROBE: NORMAL
G VAGINALIS DNA SPEC QL NAA+PROBE: NORMAL
T VAGINALIS DNA VAG QL NAA+PROBE: NORMAL

## 2024-08-05 ENCOUNTER — TELEPHONE (OUTPATIENT)
Dept: PULMONOLOGY | Age: 84
End: 2024-08-05

## 2024-08-05 DIAGNOSIS — G47.33 OSA (OBSTRUCTIVE SLEEP APNEA): Primary | ICD-10-CM

## 2024-08-05 NOTE — TELEPHONE ENCOUNTER
I received this email from Sherri ([facility]).    Hello  Patient: Courtney Taylor  [birth year]  [place]    We got a new order for this PT to get BIPAP ST. However, the notes from 6/27/24 say she was non-compliant/returned machine, so Medicare is going to require that she has a new study done that shows at least 50% of the events are central events.     Let me know if you have any questions.     Thank you   Sherri Mak  [facility]

## 2024-08-05 NOTE — TELEPHONE ENCOUNTER
Called pt, line picked up but she could not hear me announce myself after 3 attempts.  Will try back at another time.

## 2024-08-07 NOTE — TELEPHONE ENCOUNTER
Refill Request     CONFIRM preferred pharmacy with the patient.    If Mail Order Rx - Pend for 90 day refill.      Last Seen: Last Seen Department: 8/2/2024  Last Seen by PCP: 7/31/2024    Last Written: Alendronate n/a    If no future appointment scheduled:  Review the last OV with PCP and review information for follow-up visit,  Route STAFF MESSAGE with patient name to the  Pool for scheduling with the following information:            -  Timing of next visit           -  Visit type ie Physical, OV, etc           -  Diagnoses/Reason ie. COPD, HTN - Do not use MEDICATION, Follow-up or CHECK UP - Give reason for visit      Next Appointment:   Future Appointments   Date Time Provider Department Center   10/17/2024 10:40 AM Selena Ocampo DO [facility] OBGYN [facility]   12/3/2024 11:30 AM Noa Cardoza PA [facility] DEP       Message sent to  to schedule appt with patient?  NO      Requested Prescriptions     Pending Prescriptions Disp Refills    alendronate (FOSAMAX) 70 MG tablet 12 tablet 0     Sig: Take 1 tablet by mouth every 7 days

## 2024-08-08 RX ORDER — ALENDRONATE SODIUM 70 MG/1
70 TABLET ORAL
Qty: 12 TABLET | Refills: 1 | Status: SHIPPED | OUTPATIENT
Start: 2024-08-08

## 2024-08-20 ENCOUNTER — TELEPHONE (OUTPATIENT)
Dept: FAMILY MEDICINE CLINIC | Age: 84
End: 2024-08-20

## 2024-08-20 NOTE — TELEPHONE ENCOUNTER
Left message for patient to return phone call regarding open OBGYN referral.   Please see if patient has completed this, plans to proceed or no longer wishes to proceed with this.    Please CLOSE this referral if no longer wishing to proceed.

## 2024-08-23 ENCOUNTER — HOSPITAL ENCOUNTER (OUTPATIENT)
Dept: GENERAL RADIOLOGY | Age: 84
Discharge: HOME | End: 2024-08-23
Payer: MEDICARE

## 2024-08-23 ENCOUNTER — OFFICE VISIT (OUTPATIENT)
Dept: FAMILY MEDICINE CLINIC | Age: 84
End: 2024-08-23
Payer: MEDICARE

## 2024-08-23 VITALS
DIASTOLIC BLOOD PRESSURE: 80 MMHG | SYSTOLIC BLOOD PRESSURE: 140 MMHG | BODY MASS INDEX: 42.01 KG/M2 | TEMPERATURE: 97.6 F | HEIGHT: 60 IN | HEART RATE: 60 BPM | WEIGHT: 214 LBS | OXYGEN SATURATION: 93 %

## 2024-08-23 DIAGNOSIS — K21.9 GASTROESOPHAGEAL REFLUX DISEASE WITHOUT ESOPHAGITIS: ICD-10-CM

## 2024-08-23 DIAGNOSIS — M54.2 NECK PAIN: ICD-10-CM

## 2024-08-23 DIAGNOSIS — R52 WHOLE BODY PAIN: ICD-10-CM

## 2024-08-23 DIAGNOSIS — M48.062 SPINAL STENOSIS OF LUMBAR REGION WITH NEUROGENIC CLAUDICATION: ICD-10-CM

## 2024-08-23 DIAGNOSIS — E03.9 HYPOTHYROIDISM, UNSPECIFIED TYPE: Primary | Chronic | ICD-10-CM

## 2024-08-23 DIAGNOSIS — R20.8 BURNING SENSATION: ICD-10-CM

## 2024-08-23 DIAGNOSIS — G89.4 CHRONIC PAIN DISORDER: Chronic | ICD-10-CM

## 2024-08-23 DIAGNOSIS — R52 WHOLE BODY PAIN: Primary | ICD-10-CM

## 2024-08-23 LAB
ALBUMIN SERPL-MCNC: 4.3 G/DL (ref 3.4–5)
ALBUMIN/GLOB SERPL: 1.8 {RATIO} (ref 1.1–2.2)
ALP SERPL-CCNC: 94 U/L (ref 40–129)
ALT SERPL-CCNC: 13 U/L (ref 10–40)
ANION GAP SERPL CALCULATED.3IONS-SCNC: 10 MMOL/L (ref 3–16)
AST SERPL-CCNC: 27 U/L (ref 15–37)
BASOPHILS # BLD: 0 K/UL (ref 0–0.2)
BASOPHILS NFR BLD: 0.2 %
BILIRUB SERPL-MCNC: 0.4 MG/DL (ref 0–1)
BUN SERPL-MCNC: 14 MG/DL (ref 7–20)
CALCIUM SERPL-MCNC: 9.3 MG/DL (ref 8.3–10.6)
CHLORIDE SERPL-SCNC: 103 MMOL/L (ref 99–110)
CO2 SERPL-SCNC: 28 MMOL/L (ref 21–32)
CREAT SERPL-MCNC: 0.8 MG/DL (ref 0.6–1.2)
DEPRECATED RDW RBC AUTO: 14.2 % (ref 12.4–15.4)
EOSINOPHIL # BLD: 0.3 K/UL (ref 0–0.6)
EOSINOPHIL NFR BLD: 3.5 %
FOLATE SERPL-MCNC: 8.28 NG/ML (ref 4.78–24.2)
GFR SERPLBLD CREATININE-BSD FMLA CKD-EPI: 73 ML/MIN/{1.73_M2}
GLUCOSE SERPL-MCNC: 96 MG/DL (ref 70–99)
HCT VFR BLD AUTO: 36.6 % (ref 36–48)
HGB BLD-MCNC: 12.3 G/DL (ref 12–16)
LYMPHOCYTES # BLD: 1.9 K/UL (ref 1–5.1)
LYMPHOCYTES NFR BLD: 25.7 %
MCH RBC QN AUTO: 31 PG (ref 26–34)
MCHC RBC AUTO-ENTMCNC: 33.7 G/DL (ref 31–36)
MCV RBC AUTO: 92 FL (ref 80–100)
MONOCYTES # BLD: 0.9 K/UL (ref 0–1.3)
MONOCYTES NFR BLD: 13 %
NEUTROPHILS # BLD: 4.2 K/UL (ref 1.7–7.7)
NEUTROPHILS NFR BLD: 57.6 %
PLATELET # BLD AUTO: 243 K/UL (ref 135–450)
PMV BLD AUTO: 8.6 FL (ref 5–10.5)
POTASSIUM SERPL-SCNC: 4.1 MMOL/L (ref 3.5–5.1)
PROT SERPL-MCNC: 6.7 G/DL (ref 6.4–8.2)
RBC # BLD AUTO: 3.98 M/UL (ref 4–5.2)
SODIUM SERPL-SCNC: 141 MMOL/L (ref 136–145)
T4 FREE SERPL-MCNC: 2 NG/DL (ref 0.9–1.8)
TSH SERPL DL<=0.005 MIU/L-ACNC: 0.21 UIU/ML (ref 0.27–4.2)
VIT B12 SERPL-MCNC: 588 PG/ML (ref 211–911)
WBC # BLD AUTO: 7.3 K/UL (ref 4–11)

## 2024-08-23 PROCEDURE — 3077F SYST BP >= 140 MM HG: CPT | Performed by: NURSE PRACTITIONER

## 2024-08-23 PROCEDURE — 3079F DIAST BP 80-89 MM HG: CPT | Performed by: NURSE PRACTITIONER

## 2024-08-23 PROCEDURE — 99213 OFFICE O/P EST LOW 20 MIN: CPT | Performed by: NURSE PRACTITIONER

## 2024-08-23 PROCEDURE — 72040 X-RAY EXAM NECK SPINE 2-3 VW: CPT

## 2024-08-23 PROCEDURE — 1123F ACP DISCUSS/DSCN MKR DOCD: CPT | Performed by: NURSE PRACTITIONER

## 2024-08-23 RX ORDER — OMEPRAZOLE 40 MG/1
40 CAPSULE, DELAYED RELEASE ORAL
Qty: 30 CAPSULE | Refills: 5 | Status: SHIPPED | OUTPATIENT
Start: 2024-08-23

## 2024-08-23 ASSESSMENT — ENCOUNTER SYMPTOMS
COUGH: 0
WHEEZING: 0
BACK PAIN: 0
SHORTNESS OF BREATH: 0

## 2024-08-23 NOTE — PROGRESS NOTES
normal.      Breath sounds: Normal breath sounds. No wheezing, rhonchi or rales.   Musculoskeletal:      Right lower leg: No edema.      Left lower leg: No edema.   Lymphadenopathy:      Cervical: No cervical adenopathy.   Skin:     General: Skin is warm and dry.      Capillary Refill: Capillary refill takes less than 2 seconds.   Neurological:      General: No focal deficit present.      Mental Status: She is alert and oriented to person, place, and time.      Cranial Nerves: No cranial nerve deficit.      Sensory: No sensory deficit.      Gait: Gait normal.                This note was generated completely or in part utilizing Dragon dictation speech recognition software.  Occasionally, words are mistranscribed and despite editing, the text may contain inaccuracies due to incorrect word recognition.  If further clarification is needed please contact the office at [phone].     An electronic signature was used to authenticate this note.    --NESSA De Anda - CNP

## 2024-08-26 RX ORDER — LEVOTHYROXINE SODIUM 125 UG/1
125 TABLET ORAL DAILY
Qty: 90 TABLET | Refills: 1 | Status: SHIPPED | OUTPATIENT
Start: 2024-08-26

## 2024-09-02 DIAGNOSIS — I50.812 CHRONIC RIGHT-SIDED HEART FAILURE (HCC): ICD-10-CM

## 2024-09-02 NOTE — TELEPHONE ENCOUNTER
Refill Request     CONFIRM preferred pharmacy with the patient.    If Mail Order Rx - Pend for 90 day refill.      Last Seen: Last Seen Department: 8/23/2024  Last Seen by PCP: Visit date not found    Last Written: 4/8/2024    If no future appointment scheduled:  Review the last OV with PCP and review information for follow-up visit,  Route STAFF MESSAGE with patient name to the  Pool for scheduling with the following information:            -  Timing of next visit           -  Visit type ie Physical, OV, etc           -  Diagnoses/Reason ie. COPD, HTN - Do not use MEDICATION, Follow-up or CHECK UP - Give reason for visit      Next Appointment:   Future Appointments   Date Time Provider Department Center   9/5/2024  2:20 PM Aram Esteban MD AND ORTHO [facility]   10/17/2024 10:40 AM Selena Ocampo DO EG OBGYN [facility]   12/3/2024 11:30 AM Noa Cardoza PA [facility] DEP       Message sent to  to schedule appt with patient?  NO      Requested Prescriptions     Pending Prescriptions Disp Refills    metoprolol tartrate (LOPRESSOR) 25 MG tablet [Pharmacy Med Name: METOPROLOL TARTRATE 25 MG TAB] 90 tablet 0     Sig: TAKE 1 TABLET BY MOUTH DAILY

## 2024-09-03 RX ORDER — METOPROLOL TARTRATE 25 MG/1
25 TABLET, FILM COATED ORAL DAILY
Qty: 90 TABLET | Refills: 0 | Status: SHIPPED | OUTPATIENT
Start: 2024-09-03

## 2024-09-05 ENCOUNTER — OFFICE VISIT (OUTPATIENT)
Dept: ORTHOPEDIC SURGERY | Age: 84
End: 2024-09-05
Payer: MEDICARE

## 2024-09-05 VITALS — WEIGHT: 214 LBS | BODY MASS INDEX: 42.01 KG/M2 | HEIGHT: 60 IN

## 2024-09-05 DIAGNOSIS — M51.16 LUMBAR DISC HERNIATION WITH RADICULOPATHY: Primary | ICD-10-CM

## 2024-09-05 PROCEDURE — 1123F ACP DISCUSS/DSCN MKR DOCD: CPT | Performed by: ORTHOPAEDIC SURGERY

## 2024-09-05 PROCEDURE — 99204 OFFICE O/P NEW MOD 45 MIN: CPT | Performed by: ORTHOPAEDIC SURGERY

## 2024-09-05 RX ORDER — NIFEDIPINE 30 MG
TABLET, EXTENDED RELEASE ORAL
COMMUNITY

## 2024-09-05 NOTE — PROGRESS NOTES
mouth 2 times daily, Disp: , Rfl:     diclofenac sodium (VOLTAREN) 1 % GEL, Apply 2 g topically 4 times daily, Disp: 150 g, Rfl: 2    citalopram (CELEXA) 20 MG tablet, TAKE 1 TABLET BY MOUTH DAILY, Disp: 90 tablet, Rfl: 1    gabapentin (NEURONTIN) 800 MG tablet, Take 1 tablet by mouth 3 times daily for 90 days., Disp: 90 tablet, Rfl: 2    empagliflozin (JARDIANCE) 10 MG tablet, Take 1 tablet by mouth daily, Disp: 90 tablet, Rfl: 1    albuterol (PROVENTIL) (2.5 MG/3ML) 0.083% nebulizer solution, Take 3 mLs by nebulization every 6 hours as needed for Wheezing, Disp: 120 each, Rfl: 0    albuterol sulfate HFA (PROVENTIL HFA) 108 (90 Base) MCG/ACT inhaler, Inhale 2 puffs into the lungs every 4 hours as needed for Wheezing or Shortness of Breath, Disp: 18 g, Rfl: 3    cilostazol (PLETAL) 100 MG tablet, Take 1 tablet by mouth 2 times daily, Disp: 180 tablet, Rfl: 1    ciclopirox (PENLAC) 8 % solution, Apply topically to toenails nightly., Disp: 1 each, Rfl: 1    aspirin 81 MG EC tablet, Take 1 tablet by mouth daily, Disp: , Rfl:     oxyCODONE-acetaminophen (PERCOCET)  MG per tablet, TAKE ONE TABLET BY MOUTH FOUR TIMES DAILY, Disp: , Rfl:     turmeric 500 MG CAPS, Take by mouth daily, Disp: , Rfl:     atorvastatin (LIPITOR) 10 MG tablet, Take 1 tablet by mouth daily, Disp: 90 tablet, Rfl: 1    furosemide (LASIX) 20 MG tablet, Take 1 tablet every day by oral route for 90 days., Disp: 90 tablet, Rfl: 1    losartan (COZAAR) 50 MG tablet, Take 1 tablet by mouth daily, Disp: 90 tablet, Rfl: 1    potassium chloride (MICRO-K) 10 MEQ extended release capsule, Take 1 capsule by mouth 2 times daily, Disp: 180 capsule, Rfl: 1    donepezil (ARICEPT) 10 MG tablet, Take 1 tablet by mouth nightly, Disp: 90 tablet, Rfl: 1    memantine (NAMENDA) 10 MG tablet, Take 1 tablet by mouth daily, Disp: 90 tablet, Rfl: 1    naproxen (NAPROSYN) 500 MG tablet, Take 1 tablet by mouth 2 times daily for 10 days, Disp: 20 tablet, Rfl: 0    vitamin

## 2024-10-03 RX ORDER — GABAPENTIN 800 MG/1
800 TABLET ORAL 3 TIMES DAILY
Qty: 90 TABLET | Refills: 0 | OUTPATIENT
Start: 2024-10-03 | End: 2024-11-02

## 2024-10-03 NOTE — TELEPHONE ENCOUNTER
.Refill Request     CONFIRM preferred pharmacy with the patient.    If Mail Order Rx - Pend for 90 day refill.      Last Seen: Last Seen Department: 8/23/2024  Last Seen by PCP: 7/31/2024    Last Written: 5/28/24 90 with 2     If no future appointment scheduled:  Review the last OV with PCP and review information for follow-up visit,  Route STAFF MESSAGE with patient name to the  Pool for scheduling with the following information:            -  Timing of next visit           -  Visit type ie Physical, OV, etc           -  Diagnoses/Reason ie. COPD, HTN - Do not use MEDICATION, Follow-up or CHECK UP - Give reason for visit      Next Appointment:   Future Appointments   Date Time Provider Department Center   12/3/2024 11:30 AM Noa Cardoza PA [facility] DEP       Message sent to  to schedule appt with patient?  NO      Requested Prescriptions     Pending Prescriptions Disp Refills    gabapentin (NEURONTIN) 800 MG tablet [Pharmacy Med Name: GABAPENTIN 800 MG TABLET] 90 tablet 2     Sig: Take 1 tablet by mouth 3 times daily.

## 2024-10-04 NOTE — TELEPHONE ENCOUNTER
Patient states she is taking the Gabapention, not taking the Lyrica. Lyrica was making her feel sedated.

## 2024-10-11 DIAGNOSIS — E11.29 TYPE 2 DIABETES MELLITUS WITH DIABETIC MICROALBUMINURIA, WITHOUT LONG-TERM CURRENT USE OF INSULIN (HCC): ICD-10-CM

## 2024-10-11 DIAGNOSIS — R80.9 TYPE 2 DIABETES MELLITUS WITH DIABETIC MICROALBUMINURIA, WITHOUT LONG-TERM CURRENT USE OF INSULIN (HCC): ICD-10-CM

## 2024-10-11 NOTE — TELEPHONE ENCOUNTER
I didn't see on patients medication list, please verify if she is taking when she calls back. Thank you

## 2024-10-14 RX ORDER — ATORVASTATIN CALCIUM 10 MG/1
10 TABLET, FILM COATED ORAL DAILY
Qty: 90 TABLET | Refills: 1 | Status: SHIPPED | OUTPATIENT
Start: 2024-10-14

## 2024-10-14 RX ORDER — TIRZEPATIDE 7.5 MG/.5ML
INJECTION, SOLUTION SUBCUTANEOUS
Qty: 6 ML | Refills: 1 | Status: SHIPPED | OUTPATIENT
Start: 2024-10-14

## 2024-10-14 NOTE — TELEPHONE ENCOUNTER
Refill Request     CONFIRM preferred pharmacy with the patient.    If Mail Order Rx - Pend for 90 day refill.      Last Seen: Last Seen Department: 8/23/2024  Last Seen by PCP: 7/31/2024    Last Written: 4/4/24 90 tab 1 refills    If no future appointment scheduled:  Review the last OV with PCP and review information for follow-up visit,  Route STAFF MESSAGE with patient name to the  Pool for scheduling with the following information:            -  Timing of next visit           -  Visit type ie Physical, OV, etc           -  Diagnoses/Reason ie. COPD, HTN - Do not use MEDICATION, Follow-up or CHECK UP - Give reason for visit      Next Appointment:   Future Appointments   Date Time Provider Department Center   12/3/2024 11:30 AM Noa Cardoza PA [facility] DEP       Message sent to  to schedule appt with patient?  NO      Requested Prescriptions     Pending Prescriptions Disp Refills    atorvastatin (LIPITOR) 10 MG tablet [Pharmacy Med Name: ATORVASTATIN 10 MG TABLET] 90 tablet 1     Sig: TAKE 1 TABLET BY MOUTH DAILY

## 2024-10-21 ENCOUNTER — TELEPHONE (OUTPATIENT)
Dept: PULMONOLOGY | Age: 84
End: 2024-10-21

## 2024-10-21 NOTE — TELEPHONE ENCOUNTER
Spoke with pt asking if she plans to schedule the titration. Pt states that she does plan to complete the titration, but she is scheduled to see the spine doctor this week for her legs.  Pt plans to schedule soon after this.

## 2024-11-07 DIAGNOSIS — N18.32 STAGE 3B CHRONIC KIDNEY DISEASE (HCC): ICD-10-CM

## 2024-11-07 DIAGNOSIS — F03.A0 MILD DEMENTIA WITHOUT BEHAVIORAL DISTURBANCE, PSYCHOTIC DISTURBANCE, MOOD DISTURBANCE, OR ANXIETY, UNSPECIFIED DEMENTIA TYPE (HCC): ICD-10-CM

## 2024-11-07 RX ORDER — DONEPEZIL HYDROCHLORIDE 10 MG/1
10 TABLET, FILM COATED ORAL NIGHTLY
Qty: 90 TABLET | Refills: 1 | Status: SHIPPED | OUTPATIENT
Start: 2024-11-07

## 2024-11-07 RX ORDER — EMPAGLIFLOZIN 10 MG/1
10 TABLET, FILM COATED ORAL DAILY
Qty: 90 TABLET | Refills: 1 | Status: SHIPPED | OUTPATIENT
Start: 2024-11-07

## 2024-11-07 RX ORDER — POTASSIUM CHLORIDE 750 MG/1
10 CAPSULE, EXTENDED RELEASE ORAL 2 TIMES DAILY
Qty: 180 CAPSULE | Refills: 1 | Status: SHIPPED | OUTPATIENT
Start: 2024-11-07

## 2024-11-07 NOTE — TELEPHONE ENCOUNTER
.Refill Request     CONFIRM preferred pharmacy with the patient.    If Mail Order Rx - Pend for 90 day refill.      Last Seen: Last Seen Department: 8/23/2024  Last Seen by PCP: 7/31/2024    Last Written: Aricept - 4-4-24 90 with 1   Jardiance 5/23/24 90 with 1   Potassium 4/4/24 180 with 1     If no future appointment scheduled:  Review the last OV with PCP and review information for follow-up visit,  Route STAFF MESSAGE with patient name to the  Pool for scheduling with the following information:            -  Timing of next visit           -  Visit type ie Physical, OV, etc           -  Diagnoses/Reason ie. COPD, HTN - Do not use MEDICATION, Follow-up or CHECK UP - Give reason for visit      Next Appointment:   Future Appointments   Date Time Provider Department Center   12/3/2024 11:30 AM Noa Cardoza PA [facility] DEP       Message sent to  to schedule appt with patient?  NO      Requested Prescriptions     Pending Prescriptions Disp Refills    donepezil (ARICEPT) 10 MG tablet [Pharmacy Med Name: DONEPEZIL HCL 10 MG TABLET] 90 tablet 1     Sig: TAKE ONE TABLET BY MOUTH ONCE NIGHTLY    JARDIANCE 10 MG tablet [Pharmacy Med Name: JARDIANCE 10 MG TABLET] 90 tablet 1     Sig: TAKE 1 TABLET BY MOUTH DAILY    potassium chloride (MICRO-K) 10 MEQ extended release capsule [Pharmacy Med Name: POTASSIUM CL ER 10 MEQ CAPSULE] 180 capsule 1     Sig: TAKE 1 CAPSULE BY MOUTH 2 TIMES A DAY

## 2024-11-11 NOTE — TELEPHONE ENCOUNTER
Refill Request     CONFIRM preferred pharmacy with the patient.    If Mail Order Rx - Pend for 90 day refill.      Last Seen: Last Seen Department: 8/23/2024  Last Seen by PCP: 7/31/2024    Last Written: 7/31/24 150 g with 2 refills     If no future appointment scheduled:  Review the last OV with PCP and review information for follow-up visit,  Route STAFF MESSAGE with patient name to the  Pool for scheduling with the following information:            -  Timing of next visit           -  Visit type ie Physical, OV, etc           -  Diagnoses/Reason ie. COPD, HTN - Do not use MEDICATION, Follow-up or CHECK UP - Give reason for visit      Next Appointment:   Future Appointments   Date Time Provider Department Center   12/3/2024 11:30 AM Noa Cardoza PA [facility] DEP       Message sent to  to schedule appt with patient?  NO      Requested Prescriptions     Pending Prescriptions Disp Refills    diclofenac sodium (VOLTAREN) 1 % GEL [Pharmacy Med Name: DICLOFENAC SODIUM 1% GEL] 100 g      Sig: APPLY TWO GRAM TOPICALLY FOUR TIMES A DAY

## 2024-11-17 NOTE — TELEPHONE ENCOUNTER
Refill Request     CONFIRM preferred pharmacy with the patient.    If Mail Order Rx - Pend for 90 day refill.      Last Seen: Last Seen Department: 8/23/2024  Last Seen by PCP: 7/31/2024    Last Written: 4/4/2024 90 tab 1 refills    If no future appointment scheduled:  Review the last OV with PCP and review information for follow-up visit,  Route STAFF MESSAGE with patient name to the  Pool for scheduling with the following information:            -  Timing of next visit           -  Visit type ie Physical, OV, etc           -  Diagnoses/Reason ie. COPD, HTN - Do not use MEDICATION, Follow-up or CHECK UP - Give reason for visit      Next Appointment:   Future Appointments   Date Time Provider Department Center   12/3/2024 11:30 AM Noa Cardoza PA [facility] DEP       Message sent to  to schedule appt with patient?  NO      Requested Prescriptions     Pending Prescriptions Disp Refills    losartan (COZAAR) 50 MG tablet [Pharmacy Med Name: LOSARTAN POTASSIUM 50 MG TAB] 90 tablet 1     Sig: TAKE 1 TABLET BY MOUTH DAILY

## 2024-11-18 RX ORDER — LOSARTAN POTASSIUM 50 MG/1
50 TABLET ORAL DAILY
Qty: 90 TABLET | Refills: 1 | Status: SHIPPED | OUTPATIENT
Start: 2024-11-18

## 2024-12-01 DIAGNOSIS — F03.A0 MILD DEMENTIA WITHOUT BEHAVIORAL DISTURBANCE, PSYCHOTIC DISTURBANCE, MOOD DISTURBANCE, OR ANXIETY, UNSPECIFIED DEMENTIA TYPE (HCC): ICD-10-CM

## 2024-12-01 NOTE — TELEPHONE ENCOUNTER
Refill Request     CONFIRM preferred pharmacy with the patient.    If Mail Order Rx - Pend for 90 day refill.      Last Seen: Last Seen Department: 8/23/2024  Last Seen by PCP: 7/31/2024    Last Written: 4/4/2024 90 tab 1 refills    If no future appointment scheduled:  Review the last OV with PCP and review information for follow-up visit,  Route STAFF MESSAGE with patient name to the  Pool for scheduling with the following information:            -  Timing of next visit           -  Visit type ie Physical, OV, etc           -  Diagnoses/Reason ie. COPD, HTN - Do not use MEDICATION, Follow-up or CHECK UP - Give reason for visit      Next Appointment:   Future Appointments   Date Time Provider Department Center   12/3/2024 11:30 AM Noa Cardoza PA [facility] DEP       Message sent to  to schedule appt with patient?  NO      Requested Prescriptions     Pending Prescriptions Disp Refills    memantine (NAMENDA) 10 MG tablet [Pharmacy Med Name: MEMANTINE HCL 10 MG TABLET] 90 tablet 1     Sig: TAKE 1 TABLET BY MOUTH DAILY

## 2024-12-02 RX ORDER — MEMANTINE HYDROCHLORIDE 10 MG/1
10 TABLET ORAL DAILY
Qty: 90 TABLET | Refills: 1 | Status: SHIPPED | OUTPATIENT
Start: 2024-12-02 | End: 2025-05-31

## 2024-12-03 ENCOUNTER — OFFICE VISIT (OUTPATIENT)
Dept: FAMILY MEDICINE CLINIC | Age: 84
End: 2024-12-03

## 2024-12-03 VITALS
SYSTOLIC BLOOD PRESSURE: 112 MMHG | OXYGEN SATURATION: 94 % | BODY MASS INDEX: 41.68 KG/M2 | HEART RATE: 75 BPM | WEIGHT: 213.4 LBS | DIASTOLIC BLOOD PRESSURE: 60 MMHG

## 2024-12-03 DIAGNOSIS — E11.9 DIABETES MELLITUS WITH COINCIDENT HYPERTENSION (HCC): Chronic | ICD-10-CM

## 2024-12-03 DIAGNOSIS — G89.4 CHRONIC PAIN DISORDER: Chronic | ICD-10-CM

## 2024-12-03 DIAGNOSIS — E03.9 HYPOTHYROIDISM, UNSPECIFIED TYPE: Chronic | ICD-10-CM

## 2024-12-03 DIAGNOSIS — M51.379 DEGENERATION OF INTERVERTEBRAL DISC OF LUMBOSACRAL REGION, UNSPECIFIED WHETHER PAIN PRESENT: Chronic | ICD-10-CM

## 2024-12-03 DIAGNOSIS — R80.9 TYPE 2 DIABETES MELLITUS WITH DIABETIC MICROALBUMINURIA, WITHOUT LONG-TERM CURRENT USE OF INSULIN (HCC): Primary | ICD-10-CM

## 2024-12-03 DIAGNOSIS — I10 DIABETES MELLITUS WITH COINCIDENT HYPERTENSION (HCC): Chronic | ICD-10-CM

## 2024-12-03 DIAGNOSIS — I11.0 HYPERTENSIVE HEART DISEASE WITH HEART FAILURE (HCC): ICD-10-CM

## 2024-12-03 DIAGNOSIS — E11.29 TYPE 2 DIABETES MELLITUS WITH DIABETIC MICROALBUMINURIA, WITHOUT LONG-TERM CURRENT USE OF INSULIN (HCC): Primary | ICD-10-CM

## 2024-12-03 DIAGNOSIS — E78.5 HYPERLIPIDEMIA ASSOCIATED WITH TYPE 2 DIABETES MELLITUS (HCC): Chronic | ICD-10-CM

## 2024-12-03 DIAGNOSIS — F03.A0 MILD DEMENTIA WITHOUT BEHAVIORAL DISTURBANCE, PSYCHOTIC DISTURBANCE, MOOD DISTURBANCE, OR ANXIETY, UNSPECIFIED DEMENTIA TYPE (HCC): ICD-10-CM

## 2024-12-03 DIAGNOSIS — E11.69 HYPERLIPIDEMIA ASSOCIATED WITH TYPE 2 DIABETES MELLITUS (HCC): Chronic | ICD-10-CM

## 2024-12-03 DIAGNOSIS — F39 MOOD DISORDER (HCC): ICD-10-CM

## 2024-12-03 DIAGNOSIS — I50.812 CHRONIC RIGHT-SIDED HEART FAILURE (HCC): ICD-10-CM

## 2024-12-03 LAB
ALBUMIN SERPL-MCNC: 4.1 G/DL (ref 3.4–5)
ANION GAP SERPL CALCULATED.3IONS-SCNC: 8 MMOL/L (ref 3–16)
BUN SERPL-MCNC: 18 MG/DL (ref 7–20)
CALCIUM SERPL-MCNC: 9.6 MG/DL (ref 8.3–10.6)
CHLORIDE SERPL-SCNC: 104 MMOL/L (ref 99–110)
CO2 SERPL-SCNC: 28 MMOL/L (ref 21–32)
CREAT SERPL-MCNC: 0.9 MG/DL (ref 0.6–1.2)
GFR SERPLBLD CREATININE-BSD FMLA CKD-EPI: 63 ML/MIN/{1.73_M2}
GLUCOSE SERPL-MCNC: 77 MG/DL (ref 70–99)
HBA1C MFR BLD: 5.2 %
NT-PROBNP SERPL-MCNC: 276 PG/ML (ref 0–449)
PHOSPHATE SERPL-MCNC: 3.3 MG/DL (ref 2.5–4.9)
POTASSIUM SERPL-SCNC: 4.8 MMOL/L (ref 3.5–5.1)
SODIUM SERPL-SCNC: 140 MMOL/L (ref 136–145)
T4 FREE SERPL-MCNC: 1.3 NG/DL (ref 0.9–1.8)
TSH SERPL DL<=0.005 MIU/L-ACNC: 1.26 UIU/ML (ref 0.27–4.2)

## 2024-12-03 RX ORDER — TIRZEPATIDE 5 MG/.5ML
5 INJECTION, SOLUTION SUBCUTANEOUS
Qty: 2 ML | Refills: 0 | Status: SHIPPED | OUTPATIENT
Start: 2024-12-03

## 2024-12-03 ASSESSMENT — ENCOUNTER SYMPTOMS
WHEEZING: 0
DIARRHEA: 0
SHORTNESS OF BREATH: 0
BACK PAIN: 1
ABDOMINAL PAIN: 0
VOMITING: 0
CONSTIPATION: 0
BLOOD IN STOOL: 0
NAUSEA: 0

## 2024-12-03 NOTE — ASSESSMENT & PLAN NOTE
Chronic, at goal, will reduce her dose of mounjaro. I am concerned that she is not eating enough. May consider taking her off of this medication at her next appointment. Continue with jardiance.     Orders:    POCT glycosylated hemoglobin (Hb A1C)

## 2024-12-03 NOTE — ASSESSMENT & PLAN NOTE
Chronic, stable, will continue with namenda and aricept. No changes to memory per her sister. The patient continues to decline follow up with neurology

## 2024-12-03 NOTE — ASSESSMENT & PLAN NOTE
Chronic, uncontrolled, pt would like a second opinion. She has met with a surgeon who told her that surgery would be a hard recovery and he would not recommend it at her age    Orders:    DANYA - Pk Deleon MD, Pain Management, [facility]

## 2024-12-03 NOTE — ASSESSMENT & PLAN NOTE
Chronic, not at goal (unstable), pt is feeling very hopeless about her pain. I offered to switch her celexa to cymbalta but she declines at this time. Will continue with celexa and follow up in 6 months

## 2024-12-03 NOTE — ASSESSMENT & PLAN NOTE
Pt feels that swelling is improved. Will check a BNP today    Orders:    Brain Natriuretic Peptide; Future

## 2024-12-03 NOTE — ASSESSMENT & PLAN NOTE
Chronic, uncontrolled, pt would like a second opinion.     Orders:    DANYA - Pk Deleon MD, Pain Management, [facility]

## 2024-12-03 NOTE — PROGRESS NOTES
Psychiatric:         Attention and Perception: Attention and perception normal.         Mood and Affect: Mood is depressed. Affect is tearful.         Speech: Speech normal.         Behavior: Behavior normal. Behavior is cooperative.         Thought Content: Thought content normal.         Cognition and Memory: Cognition normal. Memory is impaired.         Judgment: Judgment normal.                  An electronic signature was used to authenticate this note.    --ANA Randle

## 2024-12-03 NOTE — ASSESSMENT & PLAN NOTE
Chronic, at goal (stable), continue with nifedepine, metoprolol and losartan, follow up in 6 months    Orders:    Tirzepatide (MOUNJARO) 5 MG/0.5ML SOAJ; Inject 5 mg into the skin every 7 days    Renal Function Panel; Future    Brain Natriuretic Peptide; Future

## 2024-12-03 NOTE — ASSESSMENT & PLAN NOTE
Chronic, not at goal (unstable), last lab work was abnormal, will review labs and adjust dosage of medication    Orders:    TSH; Future    T4, Free; Future

## 2024-12-11 DIAGNOSIS — I50.812 CHRONIC RIGHT-SIDED HEART FAILURE (HCC): ICD-10-CM

## 2024-12-11 RX ORDER — METOPROLOL TARTRATE 25 MG/1
25 TABLET, FILM COATED ORAL DAILY
Qty: 90 TABLET | Refills: 1 | Status: SHIPPED | OUTPATIENT
Start: 2024-12-11

## 2024-12-11 NOTE — TELEPHONE ENCOUNTER
Refill Request     CONFIRM preferred pharmacy with the patient.    If Mail Order Rx - Pend for 90 day refill.      Last Seen: Last Seen Department: 12/3/2024  Last Seen by PCP: 12/3/2024    Last Written: 09/03/2024 90 tab 0 refills     If no future appointment scheduled:  Review the last OV with PCP and review information for follow-up visit,  Route STAFF MESSAGE with patient name to the  Pool for scheduling with the following information:            -  Timing of next visit           -  Visit type ie Physical, OV, etc           -  Diagnoses/Reason ie. COPD, HTN - Do not use MEDICATION, Follow-up or CHECK UP - Give reason for visit      Next Appointment:   Future Appointments   Date Time Provider Department Center   6/3/2025 10:30 AM Noa Cardoza PA [facility] DEP       Message sent to  to schedule appt with patient?  NO      Requested Prescriptions     Pending Prescriptions Disp Refills    metoprolol tartrate (LOPRESSOR) 25 MG tablet 90 tablet 0     Sig: Take 1 tablet by mouth daily

## 2024-12-30 ENCOUNTER — TELEPHONE (OUTPATIENT)
Dept: FAMILY MEDICINE CLINIC | Age: 84
End: 2024-12-30

## 2024-12-30 RX ORDER — AMOXICILLIN 500 MG/1
2000 CAPSULE ORAL ONCE
Qty: 4 CAPSULE | Refills: 0 | Status: SHIPPED | OUTPATIENT
Start: 2024-12-30 | End: 2024-12-30

## 2024-12-30 RX ORDER — CILOSTAZOL 100 MG/1
100 TABLET ORAL 2 TIMES DAILY
Qty: 180 TABLET | Refills: 1 | Status: SHIPPED | OUTPATIENT
Start: 2024-12-30 | End: 2025-06-28

## 2024-12-30 NOTE — TELEPHONE ENCOUNTER
LM for pt to call back     Please let her know we received a medical release form from [facility] stating she needs an abx before dental work.     Noa did send in Amoxicillin to the pharmacy for her, she has a lot of allergies but it looks like amoxicillin was prescribed not long ago for her so hopefully that medication is ok for her

## 2024-12-30 NOTE — TELEPHONE ENCOUNTER
Refill Request     CONFIRM preferred pharmacy with the patient.    If Mail Order Rx - Pend for 90 day refill.      Last Seen: Last Seen Department: 12/3/2024  Last Seen by PCP: 12/3/2024    Last Written: 5/2/24 180 tabs 1 refill     If no future appointment scheduled:  Review the last OV with PCP and review information for follow-up visit,  Route STAFF MESSAGE with patient name to the  Pool for scheduling with the following information:            -  Timing of next visit           -  Visit type ie Physical, OV, etc           -  Diagnoses/Reason ie. COPD, HTN - Do not use MEDICATION, Follow-up or CHECK UP - Give reason for visit      Next Appointment:   Future Appointments   Date Time Provider Department Center   6/3/2025 10:30 AM Noa Cardoza PA [facility] DEP       Message sent to  to schedule appt with patient?  NO      Requested Prescriptions     Pending Prescriptions Disp Refills    cilostazol (PLETAL) 100 MG tablet 180 tablet 1     Sig: Take 1 tablet by mouth 2 times daily

## 2024-12-31 NOTE — TELEPHONE ENCOUNTER
Spoke with the patient, states that she isn't sure if she is getting dental work done with [facility], she saw them back in July 2024. She will call our office back if she changes her mind. Will cancel RX for Amox.       Left message notifying the pharmacy.

## 2025-01-03 DIAGNOSIS — F39 MOOD DISORDER (HCC): ICD-10-CM

## 2025-01-03 RX ORDER — CITALOPRAM HYDROBROMIDE 20 MG/1
20 TABLET ORAL DAILY
Qty: 90 TABLET | Refills: 1 | Status: SHIPPED | OUTPATIENT
Start: 2025-01-03

## 2025-01-03 NOTE — TELEPHONE ENCOUNTER
.Refill Request     CONFIRM preferred pharmacy with the patient.    If Mail Order Rx - Pend for 90 day refill.      Last Seen: Last Seen Department: 12/3/2024  Last Seen by PCP: 12/3/2024    Last Written: 7-8-24 90 with 1     If no future appointment scheduled:  Review the last OV with PCP and review information for follow-up visit,  Route STAFF MESSAGE with patient name to the  Pool for scheduling with the following information:            -  Timing of next visit           -  Visit type ie Physical, OV, etc           -  Diagnoses/Reason ie. COPD, HTN - Do not use MEDICATION, Follow-up or CHECK UP - Give reason for visit      Next Appointment:   Future Appointments   Date Time Provider Department Center   6/3/2025 10:30 AM Noa Cardoza PA [facility] DEP       Message sent to  to schedule appt with patient?  NO      Requested Prescriptions     Pending Prescriptions Disp Refills    citalopram (CELEXA) 20 MG tablet [Pharmacy Med Name: CITALOPRAM HBR 20 MG TABLET] 90 tablet 1     Sig: TAKE 1 TABLET BY MOUTH DAILY

## 2025-01-22 ENCOUNTER — TELEPHONE (OUTPATIENT)
Dept: FAMILY MEDICINE CLINIC | Age: 85
End: 2025-01-22

## 2025-01-22 RX ORDER — NYSTATIN 100000 [USP'U]/G
POWDER TOPICAL
Qty: 60 G | Refills: 1 | Status: SHIPPED | OUTPATIENT
Start: 2025-01-22

## 2025-01-22 NOTE — TELEPHONE ENCOUNTER
Received phone call from the patient in regards to asking f0or Nystatin powder to be called in for redness and rash under her tummy and breasts    Please send to cassie in [facility]

## 2025-02-03 RX ORDER — GABAPENTIN 800 MG/1
800 TABLET ORAL 3 TIMES DAILY
Qty: 90 TABLET | Refills: 3 | Status: SHIPPED | OUTPATIENT
Start: 2025-02-03 | End: 2025-06-03

## 2025-02-19 ENCOUNTER — TELEPHONE (OUTPATIENT)
Dept: FAMILY MEDICINE CLINIC | Age: 85
End: 2025-02-19

## 2025-02-19 NOTE — TELEPHONE ENCOUNTER
Spoke with the patient, she seems very confused on what she needs to get done for Dr. Moseley. She does not want surgery, but would like relief from her back pain. Patient repeated herself multiple times and would speak off subject throughout our conversation.     I asked if she had any new pains or UTI symptoms. She mention that she has consistent burning in the vaginal area, but it will travel down to her feet and up to her chin.   I could not get much information out of Courtney.     Spoke with Dr. Moseley, at his visit yesterday, orders were placed:   Endo referral- [facility]  MRI Thoracic and Lumbar   CT Thoracic and Lumabr     Called the patient back, attempt to schedule an acute visit for potential UTI, she declined until Friday 2/21/25, she is having eye surgery at [facility] on 2/20/25.     Informed her of the conversation I had with Dr. Moseley's office. Endo number given.

## 2025-02-19 NOTE — TELEPHONE ENCOUNTER
The pt has osteoporosis, which is the cause of this. She was restarted on fosamax this year. This medication will help treat the osteoporosis. The only reason she would need to see a bone specialist would be if she has been on fosamax when she lived in [place]. If that was the case, they may have alternative treatment options but if she wasn't on it before she saw me, then this medication should be correcting the issue

## 2025-02-19 NOTE — TELEPHONE ENCOUNTER
She is asking for a referral to be placed for endocrinology to view the making of the bones and see why she is getting the fractures.     She stated that [facility] provider recommended her to see an endocrinologist.     [facility]  Kip moseley  Phone: [phone]  Azo-[phone]    Attempted to contact Dr. Moseley office they are out of the office.

## 2025-02-21 ENCOUNTER — OFFICE VISIT (OUTPATIENT)
Dept: FAMILY MEDICINE CLINIC | Age: 85
End: 2025-02-21

## 2025-02-21 VITALS
HEART RATE: 71 BPM | BODY MASS INDEX: 43.19 KG/M2 | SYSTOLIC BLOOD PRESSURE: 120 MMHG | HEIGHT: 60 IN | DIASTOLIC BLOOD PRESSURE: 62 MMHG | WEIGHT: 220 LBS | OXYGEN SATURATION: 95 %

## 2025-02-21 DIAGNOSIS — B96.89 ACUTE BACTERIAL SINUSITIS: ICD-10-CM

## 2025-02-21 DIAGNOSIS — G89.4 PAIN SYNDROME, CHRONIC: ICD-10-CM

## 2025-02-21 DIAGNOSIS — J01.90 ACUTE BACTERIAL SINUSITIS: ICD-10-CM

## 2025-02-21 DIAGNOSIS — M81.0 AGE-RELATED OSTEOPOROSIS WITHOUT CURRENT PATHOLOGICAL FRACTURE: Primary | ICD-10-CM

## 2025-02-21 DIAGNOSIS — R30.0 DYSURIA: ICD-10-CM

## 2025-02-21 DIAGNOSIS — Z00.00 MEDICARE ANNUAL WELLNESS VISIT, SUBSEQUENT: ICD-10-CM

## 2025-02-21 DIAGNOSIS — H61.23 BILATERAL IMPACTED CERUMEN: ICD-10-CM

## 2025-02-21 LAB
BILIRUBIN, POC: NORMAL
BLOOD URINE, POC: NORMAL
CLARITY, POC: CLEAR
COLOR, POC: YELLOW
GLUCOSE URINE, POC: NORMAL MG/DL
KETONES, POC: NORMAL MG/DL
LEUKOCYTE EST, POC: NORMAL
NITRITE, POC: NORMAL
PH, POC: 5.5
PROTEIN, POC: NORMAL MG/DL
SPECIFIC GRAVITY, POC: 1.02
UROBILINOGEN, POC: 0.2 MG/DL

## 2025-02-21 RX ORDER — AMOXICILLIN 500 MG/1
500 CAPSULE ORAL 2 TIMES DAILY
Qty: 20 CAPSULE | Refills: 0 | Status: SHIPPED | OUTPATIENT
Start: 2025-02-21 | End: 2025-03-03

## 2025-02-21 RX ORDER — METHOCARBAMOL 750 MG/1
TABLET, FILM COATED ORAL
COMMUNITY
Start: 2025-02-18

## 2025-02-21 RX ORDER — KETOROLAC TROMETHAMINE 10 MG/1
TABLET, FILM COATED ORAL
COMMUNITY
Start: 2025-02-18

## 2025-02-21 SDOH — ECONOMIC STABILITY: FOOD INSECURITY: WITHIN THE PAST 12 MONTHS, THE FOOD YOU BOUGHT JUST DIDN'T LAST AND YOU DIDN'T HAVE MONEY TO GET MORE.: NEVER TRUE

## 2025-02-21 SDOH — ECONOMIC STABILITY: FOOD INSECURITY: WITHIN THE PAST 12 MONTHS, YOU WORRIED THAT YOUR FOOD WOULD RUN OUT BEFORE YOU GOT MONEY TO BUY MORE.: NEVER TRUE

## 2025-02-21 ASSESSMENT — PATIENT HEALTH QUESTIONNAIRE - PHQ9
10. IF YOU CHECKED OFF ANY PROBLEMS, HOW DIFFICULT HAVE THESE PROBLEMS MADE IT FOR YOU TO DO YOUR WORK, TAKE CARE OF THINGS AT HOME, OR GET ALONG WITH OTHER PEOPLE: NOT DIFFICULT AT ALL
8. MOVING OR SPEAKING SO SLOWLY THAT OTHER PEOPLE COULD HAVE NOTICED. OR THE OPPOSITE, BEING SO FIGETY OR RESTLESS THAT YOU HAVE BEEN MOVING AROUND A LOT MORE THAN USUAL: NOT AT ALL
SUM OF ALL RESPONSES TO PHQ QUESTIONS 1-9: 0
4. FEELING TIRED OR HAVING LITTLE ENERGY: NOT AT ALL
6. FEELING BAD ABOUT YOURSELF - OR THAT YOU ARE A FAILURE OR HAVE LET YOURSELF OR YOUR FAMILY DOWN: NOT AT ALL
SUM OF ALL RESPONSES TO PHQ QUESTIONS 1-9: 0
5. POOR APPETITE OR OVEREATING: NOT AT ALL
2. FEELING DOWN, DEPRESSED OR HOPELESS: NOT AT ALL
1. LITTLE INTEREST OR PLEASURE IN DOING THINGS: NOT AT ALL
SUM OF ALL RESPONSES TO PHQ QUESTIONS 1-9: 0
SUM OF ALL RESPONSES TO PHQ9 QUESTIONS 1 & 2: 0
9. THOUGHTS THAT YOU WOULD BE BETTER OFF DEAD, OR OF HURTING YOURSELF: NOT AT ALL
7. TROUBLE CONCENTRATING ON THINGS, SUCH AS READING THE NEWSPAPER OR WATCHING TELEVISION: NOT AT ALL
SUM OF ALL RESPONSES TO PHQ QUESTIONS 1-9: 0
3. TROUBLE FALLING OR STAYING ASLEEP: NOT AT ALL
DEPRESSION UNABLE TO ASSESS: FUNCTIONAL CAPACITY MOTIVATION LIMITS ACCURACY

## 2025-02-21 ASSESSMENT — ENCOUNTER SYMPTOMS
WHEEZING: 1
RHINORRHEA: 1
COUGH: 0
SORE THROAT: 0
SINUS PAIN: 0
CHEST TIGHTNESS: 0
SINUS PRESSURE: 1

## 2025-02-21 NOTE — PROGRESS NOTES
Courtney Taylor (:  1940) is a 84 y.o. female,Established patient, here for evaluation of the following chief complaint(s):  Other (Burning with urination and vaginal burning, states its been going on for 6 months ), Sinusitis (Nasal congestion and blowing her nose a lot, x's 1 week ), and Medicare AWV         Assessment & Plan  Age-related osteoporosis without current pathological fracture    Per pt, her ortho wants her to see a specialist for osteoporosis. We discussed her most recent imaging. Pt was started on fosamax a year ago. Last DEXA 2024    Orders:    Matti Garcia MD, Endocrinology, [facility]    Dysuria    Negative for signs of infection    Orders:    POCT Urinalysis no Micro    Acute bacterial sinusitis    Start amoxicillin for sinusitis symptoms, may use a nasal saline spray as well    Orders:    amoxicillin (AMOXIL) 500 MG capsule; Take 1 capsule by mouth 2 times daily for 10 days    Bilateral impacted cerumen    Start debrox drops for improvement of ear wax    Orders:    carbamide peroxide (DEBROX) 6.5 % otic solution; Place 5 drops in ear(s) 2 times daily    Pain syndrome, chronic   Monitored by specialist- no acute findings meriting change in the plan           No follow-ups on file.       Subjective   HPI  Dysuria  Timing: ongoing  Associated symptoms: pelvic pressure  Denies: hematuria  Pt reports that this burning sensation is located around her entire body but does seem to be worse in her pelvis with radiation to her hip  She is on several pain medications and feels that none of them give her relief of these symptoms    URI  Timing:one week  One week  Please see ROS  Sick contacts: none  Tx: nasal spray      Review of Systems   Constitutional:  Negative for chills, diaphoresis and fever.   HENT:  Positive for congestion, postnasal drip, rhinorrhea and sinus pressure. Negative for ear pain, sinus pain, sneezing and sore throat.    Respiratory:  Positive for wheezing.

## 2025-02-21 NOTE — ASSESSMENT & PLAN NOTE
Per pt, her ortho wants her to see a specialist for osteoporosis. We discussed her most recent imaging. Pt was started on fosamax a year ago. Last DEXA 05/2024    Or[facility]

## 2025-02-21 NOTE — PATIENT INSTRUCTIONS
This lifestyle includes eating healthy, being active, staying at a weight that's healthy for you, and not smoking or using tobacco. It also includes taking medicines as directed, managing other health conditions, and trying to get a healthy amount of sleep.  Follow-up care is a key part of your treatment and safety. Be sure to make and go to all appointments, and call your doctor if you are having problems. It's also a good idea to know your test results and keep a list of the medicines you take.  How can you care for yourself at home?  Diet    Use less salt when you cook and eat. This helps lower your blood pressure. Taste food before salting. Add only a little salt when you think you need it. With time, your taste buds will adjust to less salt.     Eat fewer snack items, fast foods, canned soups, and other high-salt, high-fat, processed foods.     Read food labels and try to avoid saturated and trans fats. They increase your risk of heart disease by raising cholesterol levels.     Limit the amount of solid fat--butter, margarine, and shortening--you eat. Use olive, peanut, or canola oil when you cook. Bake, broil, and steam foods instead of frying them.     Eat a variety of fruit and vegetables every day. Dark green, deep orange, red, or yellow fruits and vegetables are especially good for you. Examples include spinach, carrots, peaches, and berries.     Foods high in fiber can reduce your cholesterol and provide important vitamins and minerals. High-fiber foods include whole-grain cereals and breads, oatmeal, beans, brown rice, citrus fruits, and apples.     Eat lean proteins. Heart-healthy proteins include seafood, lean meats and poultry, eggs, beans, peas, nuts, seeds, and soy products.     Limit drinks and foods with added sugar. These include candy, desserts, and soda pop.   Heart-healthy lifestyle    If your doctor recommends it, get more exercise. For many people, walking is a good choice. Or you may want

## 2025-02-21 NOTE — PROGRESS NOTES
Medicare Annual Wellness Visit    Courtney Taylor is here for Other (Burning with urination and vaginal burning, states its been going on for 6 months ), Sinusitis (Nasal congestion and blowing her nose a lot, x's 1 week ), and Medicare AWV    Assessment & Plan     Medicare annual wellness visit, subsequent  -  reviewed age related cancer screenings and immunizations     No follow-ups on file.     Subjective       Patient's complete Health Risk Assessment and screening values have been reviewed and are found in Flowsheets. The following problems were reviewed today and where indicated follow up appointments were made and/or referrals ordered.    Positive Risk Factor Screenings with Interventions:      Depression:  Depression Unable to Assess: Functional capacity motivation limits accuracy  PHQ-2 Score: 0  PHQ-9 Total Score: 0          Controlled Medication Review:    Today's Pain Level: No data recorded   Opioid Risk: (Low risk score <55) Opioid risk score: 39    Patient is low risk for opioid use disorder or overdose.    Last PDMP Isaac as Reviewed:  Review User Review Instant Review Result   EDVIN ROLLE 2/3/2025  7:29 AM     Reviewed PDMP [1]      Self-assessment of health:  In general, how would you say your health is?: (!) Poor    Interventions:  Pt is seeing specialists for specific health issues     Inactivity:  On average, how many days per week do you engage in moderate to strenuous exercise (like a brisk walk)?: 0 days (!) Abnormal  On average, how many minutes do you engage in exercise at this level?: 0 min  Interventions:  Limited ability for exercise due to chronic pain syndrome    Poor Eating Habits/Diet:  Do you eat balanced/healthy meals regularly?: (!) No  Interventions:  Increase intake of vegetables at each meal    Abnormal BMI (obese):  Body mass index is 42.97 kg/m². (!) Abnormal  Interventions:  Patient declines any further evaluation or treatment         Hearing Screen:  Do you or your

## 2025-02-24 DIAGNOSIS — E03.9 HYPOTHYROIDISM, UNSPECIFIED TYPE: Chronic | ICD-10-CM

## 2025-02-24 RX ORDER — LEVOTHYROXINE SODIUM 125 UG/1
125 TABLET ORAL DAILY
Qty: 90 TABLET | Refills: 1 | Status: SHIPPED | OUTPATIENT
Start: 2025-02-24

## 2025-02-24 NOTE — TELEPHONE ENCOUNTER
Refill Request     CONFIRM preferred pharmacy with the patient.    If Mail Order Rx - Pend for 90 day refill.      Last Seen: Last Seen Department: 2/21/2025  Last Seen by PCP: 2/21/2025    Last Written: 8/26/24 90 tabs 1 refill     If no future appointment scheduled:  Review the last OV with PCP and review information for follow-up visit,  Route STAFF MESSAGE with patient name to the  Pool for scheduling with the following information:            -  Timing of next visit           -  Visit type ie Physical, OV, etc           -  Diagnoses/Reason ie. COPD, HTN - Do not use MEDICATION, Follow-up or CHECK UP - Give reason for visit      Next Appointment:   Future Appointments   Date Time Provider Department Center   6/3/2025 10:30 AM Noa Cardoza PA [facility] DEP       Message sent to  to schedule appt with patient?  NO      Requested Prescriptions     Pending Prescriptions Disp Refills    levothyroxine (SYNTHROID) 125 MCG tablet 90 tablet 1     Sig: Take 1 tablet by mouth daily

## 2025-03-03 RX ORDER — ALENDRONATE SODIUM 70 MG/1
TABLET ORAL
Qty: 12 TABLET | Refills: 1 | Status: SHIPPED | OUTPATIENT
Start: 2025-03-03

## 2025-03-03 NOTE — TELEPHONE ENCOUNTER
Refill Request     CONFIRM preferred pharmacy with the patient.    If Mail Order Rx - Pend for 90 day refill.      Last Seen: Last Seen Department: 2/21/2025  Last Seen by PCP: 2/21/2025    Last Written: 8/8/2024 12 tab 1 refills    If no future appointment scheduled:  Review the last OV with PCP and review information for follow-up visit,  Route STAFF MESSAGE with patient name to the  Pool for scheduling with the following information:            -  Timing of next visit           -  Visit type ie Physical, OV, etc           -  Diagnoses/Reason ie. COPD, HTN - Do not use MEDICATION, Follow-up or CHECK UP - Give reason for visit      Next Appointment:   Future Appointments   Date Time Provider Department Center   6/3/2025 10:30 AM Noa Cardoza PA [facility] DEP       Message sent to  to schedule appt with patient?  NO      Requested Prescriptions     Pending Prescriptions Disp Refills    alendronate (FOSAMAX) 70 MG tablet [Pharmacy Med Name: ALENDRONATE SODIUM 70 MG TAB] 12 tablet 1     Sig: TAKE 1 TABLET BY MOUTH ONCE WEEKLY ON AN EMPTY STOMACH BEFORE BREAKFAST. REMAIN UPRIGHT FOR 30 MINUTES AND TAKE WITH 8 OUNCES OF WATER

## 2025-03-31 ENCOUNTER — HOSPITAL ENCOUNTER (OUTPATIENT)
Dept: CT IMAGING | Age: 85
Discharge: HOME | End: 2025-03-31
Attending: STUDENT IN AN ORGANIZED HEALTH CARE EDUCATION/TRAINING PROGRAM
Payer: MEDICARE

## 2025-03-31 ENCOUNTER — HOSPITAL ENCOUNTER (OUTPATIENT)
Dept: MRI IMAGING | Age: 85
Discharge: HOME | End: 2025-03-31
Attending: STUDENT IN AN ORGANIZED HEALTH CARE EDUCATION/TRAINING PROGRAM
Payer: MEDICARE

## 2025-03-31 ENCOUNTER — TELEPHONE (OUTPATIENT)
Dept: ENDOCRINOLOGY | Age: 85
End: 2025-03-31

## 2025-03-31 DIAGNOSIS — M54.10 RADICULOPATHY, UNSPECIFIED SPINAL REGION: ICD-10-CM

## 2025-03-31 DIAGNOSIS — M46.90 INFLAMMATORY SPONDYLOPATHY, UNSPECIFIED SPINAL REGION: ICD-10-CM

## 2025-03-31 DIAGNOSIS — M54.50 LOW BACK PAIN, UNSPECIFIED BACK PAIN LATERALITY, UNSPECIFIED CHRONICITY, UNSPECIFIED WHETHER SCIATICA PRESENT: ICD-10-CM

## 2025-03-31 PROCEDURE — 72128 CT CHEST SPINE W/O DYE: CPT

## 2025-03-31 PROCEDURE — 72131 CT LUMBAR SPINE W/O DYE: CPT

## 2025-04-06 ENCOUNTER — HOSPITAL ENCOUNTER (OUTPATIENT)
Dept: MRI IMAGING | Age: 85
Discharge: HOME | End: 2025-04-06
Attending: STUDENT IN AN ORGANIZED HEALTH CARE EDUCATION/TRAINING PROGRAM
Payer: MEDICARE

## 2025-04-06 PROCEDURE — 72146 MRI CHEST SPINE W/O DYE: CPT

## 2025-04-06 PROCEDURE — 72148 MRI LUMBAR SPINE W/O DYE: CPT

## 2025-04-07 ENCOUNTER — TELEPHONE (OUTPATIENT)
Dept: FAMILY MEDICINE CLINIC | Age: 85
End: 2025-04-07

## 2025-04-07 DIAGNOSIS — L30.4 INTERTRIGO: Primary | ICD-10-CM

## 2025-04-07 RX ORDER — KETOCONAZOLE 20 MG/G
CREAM TOPICAL
Qty: 60 G | Refills: 1 | Status: SHIPPED | OUTPATIENT
Start: 2025-04-07

## 2025-04-07 NOTE — TELEPHONE ENCOUNTER
Patient states that she has a rash under her breast, typically the Nystatin Powder works but this time it is not. The rash is sore if touched and red.   Patient is asking if Diflucan can be sent into the pharmacy to see if it would help.       Pharmcy- Jeannette Frank

## 2025-04-07 NOTE — TELEPHONE ENCOUNTER
Let's try a different topical called ketoconazole. This is a great. I have sent this over to her pharmacy for her. Stop using the nystatin. Keep area dry. Let me know if there is no improvement in symptoms in the next 7 days

## 2025-04-16 ENCOUNTER — TRANSCRIBE ORDERS (OUTPATIENT)
Dept: ADMINISTRATIVE | Age: 85
End: 2025-04-16

## 2025-04-16 DIAGNOSIS — M25.552 PAIN IN LEFT HIP: Primary | ICD-10-CM

## 2025-04-16 DIAGNOSIS — I50.812 CHRONIC RIGHT-SIDED HEART FAILURE (HCC): ICD-10-CM

## 2025-04-16 RX ORDER — METOPROLOL TARTRATE 25 MG/1
25 TABLET, FILM COATED ORAL DAILY
Qty: 90 TABLET | Refills: 1 | Status: SHIPPED | OUTPATIENT
Start: 2025-04-16

## 2025-04-16 NOTE — TELEPHONE ENCOUNTER
.Refill Request     CONFIRM preferred pharmacy with the patient.    If Mail Order Rx - Pend for 90 day refill.      Last Seen: Last Seen Department: 2/21/2025  Last Seen by PCP: 2/21/2025    Last Written: 12-11-24 90 with 1     If no future appointment scheduled:  Review the last OV with PCP and review information for follow-up visit,  Route STAFF MESSAGE with patient name to the  Pool for scheduling with the following information:            -  Timing of next visit           -  Visit type ie Physical, OV, etc           -  Diagnoses/Reason ie. COPD, HTN - Do not use MEDICATION, Follow-up or CHECK UP - Give reason for visit      Next Appointment:   Future Appointments   Date Time Provider Department Center   4/23/2025  1:00 PM [facility]   6/3/2025 10:30 AM Noa Cardoza PA [facility] DEP       Message sent to  to schedule appt with patient?  NO      Requested Prescriptions     Pending Prescriptions Disp Refills    metoprolol tartrate (LOPRESSOR) 25 MG tablet [Pharmacy Med Name: METOPROLOL TARTRATE 25 MG TAB] 90 tablet 1     Sig: TAKE 1 TABLET BY MOUTH DAILY

## 2025-04-17 RX ORDER — ATORVASTATIN CALCIUM 10 MG/1
10 TABLET, FILM COATED ORAL DAILY
Qty: 90 TABLET | Refills: 1 | Status: SHIPPED | OUTPATIENT
Start: 2025-04-17

## 2025-04-17 NOTE — TELEPHONE ENCOUNTER
.Refill Request     CONFIRM preferred pharmacy with the patient.    If Mail Order Rx - Pend for 90 day refill.      Last Seen: Last Seen Department: 2/21/2025  Last Seen by PCP: 2/21/2025    Last Written: 10-14-24 90 with 1     If no future appointment scheduled:  Review the last OV with PCP and review information for follow-up visit,  Route STAFF MESSAGE with patient name to the  Pool for scheduling with the following information:            -  Timing of next visit           -  Visit type ie Physical, OV, etc           -  Diagnoses/Reason ie. COPD, HTN - Do not use MEDICATION, Follow-up or CHECK UP - Give reason for visit      Next Appointment:   Future Appointments   Date Time Provider Department Center   4/23/2025  1:00 PM [facility]   6/3/2025 10:30 AM [facility]a Cardoza PA [facility] DEP       Message sent to  to schedule appt with patient?  NO      Requested Prescriptions     Pending Prescriptions Disp Refills    atorvastatin (LIPITOR) 10 MG tablet [Pharmacy Med Name: ATORVASTATIN 10 MG TABLET] 90 tablet 1     Sig: TAKE 1 TABLET BY MOUTH DAILY

## 2025-04-18 ENCOUNTER — TELEPHONE (OUTPATIENT)
Dept: ENDOCRINOLOGY | Age: 85
End: 2025-04-18

## 2025-04-18 NOTE — TELEPHONE ENCOUNTER
New patient scheduled 1 pm April 23 (no DXA).  Please apologize to her.  I need to be away that afternoon so I need her to be rescheduled. Please block that slot. Thanks.

## 2025-04-22 ENCOUNTER — TRANSCRIBE ORDERS (OUTPATIENT)
Dept: ADMINISTRATIVE | Age: 85
End: 2025-04-22

## 2025-04-22 DIAGNOSIS — M54.12 RADICULOPATHY, CERVICAL: Primary | ICD-10-CM

## 2025-05-01 NOTE — TELEPHONE ENCOUNTER
Refill Request     CONFIRM preferred pharmacy with the patient.    If Mail Order Rx - Pend for 90 day refill.      Last Seen: Last Seen Department: 2/21/2025  Last Seen by PCP: 2/21/2025    Last Written: 11/11/2024 100 g 3 refills     If no future appointment scheduled:  Review the last OV with PCP and review information for follow-up visit,  Route STAFF MESSAGE with patient name to the  Pool for scheduling with the following information:            -  Timing of next visit           -  Visit type ie Physical, OV, etc           -  Diagnoses/Reason ie. COPD, HTN - Do not use MEDICATION, Follow-up or CHECK UP - Give reason for visit      Next Appointment:   Future Appointments   Date Time Provider Department Center   5/9/2025 10:00 AM [facility] MRI RM 1 [facility] MRI [place] [facility]   5/10/2025 10:00 AM [facility] [place] [facility]   5/14/2025  1:00 PM [facility]   6/3/2025 10:30 AM Noa Cardoza PA [facility] DEP       Message sent to  to schedule appt with patient?  NO      Requested Prescriptions     Pending Prescriptions Disp Refills    diclofenac sodium (VOLTAREN) 1 %  g 3     Sig: Apply 2 g topically 4 times daily as needed for Pain

## 2025-05-06 ENCOUNTER — TELEPHONE (OUTPATIENT)
Dept: FAMILY MEDICINE CLINIC | Age: 85
End: 2025-05-06

## 2025-05-06 NOTE — TELEPHONE ENCOUNTER
Pt states the Ketoconazole is not helping the area under her breast. She would like to know what you recommend. Please advise.     Pharmacy pended.

## 2025-05-07 RX ORDER — FLUCONAZOLE 150 MG/1
150 TABLET ORAL
Qty: 3 TABLET | Refills: 0 | Status: ON HOLD | OUTPATIENT
Start: 2025-05-07 | End: 2025-05-13 | Stop reason: HOSPADM

## 2025-05-07 NOTE — TELEPHONE ENCOUNTER
Diflucan tablet sent to her pharmacy. Take one tablet once weekly for three weeks. Make sure the area is dry and that she is cleaning it with a mild soap daily. If there is no improvement with this treatment she will need an appointment

## 2025-05-09 ENCOUNTER — HOSPITAL ENCOUNTER (OUTPATIENT)
Dept: CT IMAGING | Age: 85
Discharge: HOME | End: 2025-05-09
Attending: STUDENT IN AN ORGANIZED HEALTH CARE EDUCATION/TRAINING PROGRAM
Payer: MEDICARE

## 2025-05-09 ENCOUNTER — HOSPITAL ENCOUNTER (OUTPATIENT)
Dept: MRI IMAGING | Age: 85
Discharge: HOME | End: 2025-05-09
Attending: STUDENT IN AN ORGANIZED HEALTH CARE EDUCATION/TRAINING PROGRAM
Payer: MEDICARE

## 2025-05-09 DIAGNOSIS — M25.552 PAIN IN LEFT HIP: ICD-10-CM

## 2025-05-09 PROCEDURE — 73721 MRI JNT OF LWR EXTRE W/O DYE: CPT

## 2025-05-09 PROCEDURE — 72125 CT NECK SPINE W/O DYE: CPT

## 2025-05-10 ENCOUNTER — APPOINTMENT (OUTPATIENT)
Dept: CT IMAGING | Age: 85
DRG: 552 | End: 2025-05-10
Payer: MEDICARE

## 2025-05-10 ENCOUNTER — HOSPITAL ENCOUNTER (OUTPATIENT)
Dept: CT IMAGING | Age: 85
Discharge: HOME | End: 2025-05-10
Attending: STUDENT IN AN ORGANIZED HEALTH CARE EDUCATION/TRAINING PROGRAM

## 2025-05-10 ENCOUNTER — HOSPITAL ENCOUNTER (INPATIENT)
Age: 85
LOS: 1 days | Discharge: NURSING, REHAB OR LONG-TERM CARE | DRG: 552 | End: 2025-05-13
Attending: EMERGENCY MEDICINE | Admitting: INTERNAL MEDICINE
Payer: MEDICARE

## 2025-05-10 DIAGNOSIS — M54.12 RADICULOPATHY, CERVICAL: ICD-10-CM

## 2025-05-10 DIAGNOSIS — I50.812 CHRONIC RIGHT-SIDED HEART FAILURE (HCC): ICD-10-CM

## 2025-05-10 DIAGNOSIS — M54.59 INTRACTABLE LOW BACK PAIN: Primary | ICD-10-CM

## 2025-05-10 PROBLEM — M54.32 SCIATIC PAIN, LEFT: Status: ACTIVE | Noted: 2025-05-10

## 2025-05-10 LAB
ALBUMIN SERPL-MCNC: 3.6 G/DL (ref 3.4–5)
ALBUMIN/GLOB SERPL: 1.5 {RATIO} (ref 1.1–2.2)
ALP SERPL-CCNC: 90 U/L (ref 40–129)
ALT SERPL-CCNC: 12 U/L (ref 10–40)
ANION GAP SERPL CALCULATED.3IONS-SCNC: 7 MMOL/L (ref 3–16)
AST SERPL-CCNC: 21 U/L (ref 15–37)
BASOPHILS # BLD: 0 K/UL (ref 0–0.2)
BASOPHILS NFR BLD: 0.3 %
BILIRUB SERPL-MCNC: 0.4 MG/DL (ref 0–1)
BILIRUB UR QL STRIP.AUTO: NEGATIVE
BUN SERPL-MCNC: 17 MG/DL (ref 7–20)
CALCIUM SERPL-MCNC: 9.5 MG/DL (ref 8.3–10.6)
CHLORIDE SERPL-SCNC: 101 MMOL/L (ref 99–110)
CLARITY UR: CLEAR
CO2 SERPL-SCNC: 28 MMOL/L (ref 21–32)
COLOR UR: YELLOW
CREAT SERPL-MCNC: 0.7 MG/DL (ref 0.6–1.2)
DEPRECATED RDW RBC AUTO: 14.7 % (ref 12.4–15.4)
EOSINOPHIL # BLD: 0.1 K/UL (ref 0–0.6)
EOSINOPHIL NFR BLD: 1.1 %
GFR SERPLBLD CREATININE-BSD FMLA CKD-EPI: 85 ML/MIN/{1.73_M2}
GLUCOSE SERPL-MCNC: 125 MG/DL (ref 70–99)
GLUCOSE UR STRIP.AUTO-MCNC: >=1000 MG/DL
HCT VFR BLD AUTO: 36.2 % (ref 36–48)
HGB BLD-MCNC: 11.9 G/DL (ref 12–16)
HGB UR QL STRIP.AUTO: NEGATIVE
KETONES UR STRIP.AUTO-MCNC: NEGATIVE MG/DL
LEUKOCYTE ESTERASE UR QL STRIP.AUTO: NEGATIVE
LYMPHOCYTES # BLD: 1.3 K/UL (ref 1–5.1)
LYMPHOCYTES NFR BLD: 25.2 %
MCH RBC QN AUTO: 31 PG (ref 26–34)
MCHC RBC AUTO-ENTMCNC: 32.9 G/DL (ref 31–36)
MCV RBC AUTO: 94 FL (ref 80–100)
MONOCYTES # BLD: 0.8 K/UL (ref 0–1.3)
MONOCYTES NFR BLD: 16 %
NEUTROPHILS # BLD: 2.9 K/UL (ref 1.7–7.7)
NEUTROPHILS NFR BLD: 57.4 %
NITRITE UR QL STRIP.AUTO: NEGATIVE
PH UR STRIP.AUTO: 6 [PH] (ref 5–8)
PLATELET # BLD AUTO: 198 K/UL (ref 135–450)
PMV BLD AUTO: 7.8 FL (ref 5–10.5)
POTASSIUM SERPL-SCNC: 4 MMOL/L (ref 3.5–5.1)
PROT SERPL-MCNC: 6 G/DL (ref 6.4–8.2)
PROT UR STRIP.AUTO-MCNC: NEGATIVE MG/DL
RBC # BLD AUTO: 3.85 M/UL (ref 4–5.2)
SODIUM SERPL-SCNC: 136 MMOL/L (ref 136–145)
SP GR UR STRIP.AUTO: <=1.005 (ref 1–1.03)
TROPONIN, HIGH SENSITIVITY: 32 NG/L (ref 0–14)
UA COMPLETE W REFLEX CULTURE PNL UR: ABNORMAL
UA DIPSTICK W REFLEX MICRO PNL UR: ABNORMAL
URN SPEC COLLECT METH UR: ABNORMAL
UROBILINOGEN UR STRIP-ACNC: 0.2 E.U./DL
WBC # BLD AUTO: 5 K/UL (ref 4–11)

## 2025-05-10 PROCEDURE — 74176 CT ABD & PELVIS W/O CONTRAST: CPT

## 2025-05-10 PROCEDURE — 6360000002 HC RX W HCPCS: Performed by: NURSE PRACTITIONER

## 2025-05-10 PROCEDURE — 85025 COMPLETE CBC W/AUTO DIFF WBC: CPT

## 2025-05-10 PROCEDURE — 6370000000 HC RX 637 (ALT 250 FOR IP): Performed by: NURSE PRACTITIONER

## 2025-05-10 PROCEDURE — 80053 COMPREHEN METABOLIC PANEL: CPT

## 2025-05-10 PROCEDURE — 96374 THER/PROPH/DIAG INJ IV PUSH: CPT

## 2025-05-10 PROCEDURE — 84484 ASSAY OF TROPONIN QUANT: CPT

## 2025-05-10 PROCEDURE — 96375 TX/PRO/DX INJ NEW DRUG ADDON: CPT

## 2025-05-10 PROCEDURE — 96376 TX/PRO/DX INJ SAME DRUG ADON: CPT

## 2025-05-10 PROCEDURE — 99285 EMERGENCY DEPT VISIT HI MDM: CPT

## 2025-05-10 PROCEDURE — 81003 URINALYSIS AUTO W/O SCOPE: CPT

## 2025-05-10 PROCEDURE — G0378 HOSPITAL OBSERVATION PER HR: HCPCS

## 2025-05-10 RX ORDER — LEVOTHYROXINE SODIUM 125 UG/1
125 TABLET ORAL DAILY
Status: DISCONTINUED | OUTPATIENT
Start: 2025-05-11 | End: 2025-05-13 | Stop reason: HOSPADM

## 2025-05-10 RX ORDER — LIDOCAINE 4 G/G
1 PATCH TOPICAL ONCE
Status: COMPLETED | OUTPATIENT
Start: 2025-05-10 | End: 2025-05-11

## 2025-05-10 RX ORDER — SODIUM CHLORIDE 0.9 % (FLUSH) 0.9 %
10 SYRINGE (ML) INJECTION PRN
Status: DISCONTINUED | OUTPATIENT
Start: 2025-05-10 | End: 2025-05-13 | Stop reason: HOSPADM

## 2025-05-10 RX ORDER — CITALOPRAM HYDROBROMIDE 20 MG/1
20 TABLET ORAL DAILY
Status: DISCONTINUED | OUTPATIENT
Start: 2025-05-11 | End: 2025-05-13 | Stop reason: HOSPADM

## 2025-05-10 RX ORDER — CILOSTAZOL 100 MG/1
100 TABLET ORAL 2 TIMES DAILY
Status: DISCONTINUED | OUTPATIENT
Start: 2025-05-10 | End: 2025-05-13 | Stop reason: HOSPADM

## 2025-05-10 RX ORDER — ONDANSETRON 2 MG/ML
4 INJECTION INTRAMUSCULAR; INTRAVENOUS EVERY 6 HOURS PRN
Status: DISCONTINUED | OUTPATIENT
Start: 2025-05-10 | End: 2025-05-13 | Stop reason: HOSPADM

## 2025-05-10 RX ORDER — ATORVASTATIN CALCIUM 10 MG/1
10 TABLET, FILM COATED ORAL DAILY
Status: DISCONTINUED | OUTPATIENT
Start: 2025-05-11 | End: 2025-05-13 | Stop reason: HOSPADM

## 2025-05-10 RX ORDER — ASPIRIN 81 MG/1
81 TABLET ORAL DAILY
Status: DISCONTINUED | OUTPATIENT
Start: 2025-05-11 | End: 2025-05-13 | Stop reason: HOSPADM

## 2025-05-10 RX ORDER — DONEPEZIL HYDROCHLORIDE 5 MG/1
10 TABLET, FILM COATED ORAL NIGHTLY
Status: DISCONTINUED | OUTPATIENT
Start: 2025-05-10 | End: 2025-05-13 | Stop reason: HOSPADM

## 2025-05-10 RX ORDER — LOSARTAN POTASSIUM 50 MG/1
50 TABLET ORAL DAILY
Status: DISCONTINUED | OUTPATIENT
Start: 2025-05-11 | End: 2025-05-13 | Stop reason: HOSPADM

## 2025-05-10 RX ORDER — SODIUM CHLORIDE 0.9 % (FLUSH) 0.9 %
10 SYRINGE (ML) INJECTION EVERY 12 HOURS SCHEDULED
Status: DISCONTINUED | OUTPATIENT
Start: 2025-05-10 | End: 2025-05-13 | Stop reason: HOSPADM

## 2025-05-10 RX ORDER — ACETAMINOPHEN 325 MG/1
650 TABLET ORAL EVERY 6 HOURS PRN
Status: DISCONTINUED | OUTPATIENT
Start: 2025-05-10 | End: 2025-05-13 | Stop reason: HOSPADM

## 2025-05-10 RX ORDER — KETOROLAC TROMETHAMINE 15 MG/ML
15 INJECTION, SOLUTION INTRAMUSCULAR; INTRAVENOUS ONCE
Status: COMPLETED | OUTPATIENT
Start: 2025-05-10 | End: 2025-05-10

## 2025-05-10 RX ORDER — MEMANTINE HYDROCHLORIDE 5 MG/1
10 TABLET ORAL DAILY
Status: DISCONTINUED | OUTPATIENT
Start: 2025-05-11 | End: 2025-05-13 | Stop reason: HOSPADM

## 2025-05-10 RX ORDER — GABAPENTIN 300 MG/1
300 CAPSULE ORAL ONCE
Status: DISCONTINUED | OUTPATIENT
Start: 2025-05-10 | End: 2025-05-10

## 2025-05-10 RX ORDER — PROMETHAZINE HYDROCHLORIDE 25 MG/1
12.5 TABLET ORAL EVERY 6 HOURS PRN
Status: DISCONTINUED | OUTPATIENT
Start: 2025-05-10 | End: 2025-05-13 | Stop reason: HOSPADM

## 2025-05-10 RX ORDER — OXYCODONE AND ACETAMINOPHEN 10; 325 MG/1; MG/1
1 TABLET ORAL EVERY 6 HOURS PRN
Refills: 0 | Status: DISCONTINUED | OUTPATIENT
Start: 2025-05-10 | End: 2025-05-13 | Stop reason: HOSPADM

## 2025-05-10 RX ORDER — ACETAMINOPHEN 650 MG/1
650 SUPPOSITORY RECTAL EVERY 6 HOURS PRN
Status: DISCONTINUED | OUTPATIENT
Start: 2025-05-10 | End: 2025-05-13 | Stop reason: HOSPADM

## 2025-05-10 RX ORDER — SODIUM CHLORIDE 9 MG/ML
INJECTION, SOLUTION INTRAVENOUS PRN
Status: DISCONTINUED | OUTPATIENT
Start: 2025-05-10 | End: 2025-05-13 | Stop reason: HOSPADM

## 2025-05-10 RX ORDER — NIFEDIPINE 30 MG/1
30 TABLET, EXTENDED RELEASE ORAL DAILY
Status: DISCONTINUED | OUTPATIENT
Start: 2025-05-11 | End: 2025-05-13 | Stop reason: HOSPADM

## 2025-05-10 RX ORDER — ENOXAPARIN SODIUM 100 MG/ML
40 INJECTION SUBCUTANEOUS DAILY
Status: DISCONTINUED | OUTPATIENT
Start: 2025-05-11 | End: 2025-05-13 | Stop reason: HOSPADM

## 2025-05-10 RX ORDER — PANTOPRAZOLE SODIUM 40 MG/1
40 TABLET, DELAYED RELEASE ORAL
Status: DISCONTINUED | OUTPATIENT
Start: 2025-05-11 | End: 2025-05-13 | Stop reason: HOSPADM

## 2025-05-10 RX ORDER — HYDROCODONE BITARTRATE AND ACETAMINOPHEN 5; 325 MG/1; MG/1
1 TABLET ORAL ONCE
Status: COMPLETED | OUTPATIENT
Start: 2025-05-10 | End: 2025-05-10

## 2025-05-10 RX ORDER — GABAPENTIN 400 MG/1
800 CAPSULE ORAL 3 TIMES DAILY
Status: DISCONTINUED | OUTPATIENT
Start: 2025-05-10 | End: 2025-05-13 | Stop reason: HOSPADM

## 2025-05-10 RX ORDER — NICOTINE 21 MG/24HR
1 PATCH, TRANSDERMAL 24 HOURS TRANSDERMAL DAILY PRN
Status: DISCONTINUED | OUTPATIENT
Start: 2025-05-10 | End: 2025-05-13 | Stop reason: HOSPADM

## 2025-05-10 RX ADMIN — KETOROLAC TROMETHAMINE 15 MG: 15 INJECTION, SOLUTION INTRAMUSCULAR; INTRAVENOUS at 16:58

## 2025-05-10 RX ADMIN — HYDROCODONE BITARTRATE AND ACETAMINOPHEN 1 TABLET: 5; 325 TABLET ORAL at 16:57

## 2025-05-10 RX ADMIN — HYDROMORPHONE HYDROCHLORIDE 0.5 MG: 1 INJECTION, SOLUTION INTRAMUSCULAR; INTRAVENOUS; SUBCUTANEOUS at 20:12

## 2025-05-10 RX ADMIN — KETOROLAC TROMETHAMINE 15 MG: 15 INJECTION, SOLUTION INTRAMUSCULAR; INTRAVENOUS at 18:08

## 2025-05-10 ASSESSMENT — PAIN SCALES - GENERAL
PAINLEVEL_OUTOF10: 10
PAINLEVEL_OUTOF10: 6
PAINLEVEL_OUTOF10: 8
PAINLEVEL_OUTOF10: 10
PAINLEVEL_OUTOF10: 8
PAINLEVEL_OUTOF10: 8

## 2025-05-10 ASSESSMENT — PAIN DESCRIPTION - LOCATION
LOCATION: BACK

## 2025-05-10 ASSESSMENT — PAIN DESCRIPTION - DESCRIPTORS
DESCRIPTORS: DISCOMFORT

## 2025-05-10 ASSESSMENT — PAIN - FUNCTIONAL ASSESSMENT: PAIN_FUNCTIONAL_ASSESSMENT: 0-10

## 2025-05-10 ASSESSMENT — PAIN DESCRIPTION - ORIENTATION
ORIENTATION: LOWER

## 2025-05-11 PROBLEM — E11.65 TYPE 2 DIABETES MELLITUS WITH HYPERGLYCEMIA, WITHOUT LONG-TERM CURRENT USE OF INSULIN (HCC): Status: ACTIVE | Noted: 2025-05-11

## 2025-05-11 PROBLEM — K59.00 CONSTIPATION: Status: ACTIVE | Noted: 2025-05-11

## 2025-05-11 PROBLEM — M54.59 INTRACTABLE LOW BACK PAIN: Status: ACTIVE | Noted: 2025-05-11

## 2025-05-11 LAB
ALBUMIN SERPL-MCNC: 3.1 G/DL (ref 3.4–5)
ALBUMIN/GLOB SERPL: 1.1 {RATIO} (ref 1.1–2.2)
ALP SERPL-CCNC: 78 U/L (ref 40–129)
ALT SERPL-CCNC: 10 U/L (ref 10–40)
ANION GAP SERPL CALCULATED.3IONS-SCNC: 10 MMOL/L (ref 3–16)
AST SERPL-CCNC: 21 U/L (ref 15–37)
BASOPHILS # BLD: 0 K/UL (ref 0–0.2)
BASOPHILS NFR BLD: 0.6 %
BILIRUB SERPL-MCNC: 0.4 MG/DL (ref 0–1)
BUN SERPL-MCNC: 19 MG/DL (ref 7–20)
CALCIUM SERPL-MCNC: 9.1 MG/DL (ref 8.3–10.6)
CHLORIDE SERPL-SCNC: 105 MMOL/L (ref 99–110)
CO2 SERPL-SCNC: 23 MMOL/L (ref 21–32)
CREAT SERPL-MCNC: 0.6 MG/DL (ref 0.6–1.2)
DEPRECATED RDW RBC AUTO: 14.4 % (ref 12.4–15.4)
EOSINOPHIL # BLD: 0.1 K/UL (ref 0–0.6)
EOSINOPHIL NFR BLD: 2.5 %
GFR SERPLBLD CREATININE-BSD FMLA CKD-EPI: 88 ML/MIN/{1.73_M2}
GLUCOSE BLD-MCNC: 112 MG/DL (ref 70–99)
GLUCOSE BLD-MCNC: 115 MG/DL (ref 70–99)
GLUCOSE BLD-MCNC: 148 MG/DL (ref 70–99)
GLUCOSE BLD-MCNC: 83 MG/DL (ref 70–99)
GLUCOSE BLD-MCNC: 86 MG/DL (ref 70–99)
GLUCOSE SERPL-MCNC: 88 MG/DL (ref 70–99)
HCT VFR BLD AUTO: 34.2 % (ref 36–48)
HGB BLD-MCNC: 11.2 G/DL (ref 12–16)
LYMPHOCYTES # BLD: 1.9 K/UL (ref 1–5.1)
LYMPHOCYTES NFR BLD: 39.1 %
MCH RBC QN AUTO: 30.8 PG (ref 26–34)
MCHC RBC AUTO-ENTMCNC: 32.9 G/DL (ref 31–36)
MCV RBC AUTO: 93.8 FL (ref 80–100)
MONOCYTES # BLD: 0.6 K/UL (ref 0–1.3)
MONOCYTES NFR BLD: 12.4 %
NEUTROPHILS # BLD: 2.2 K/UL (ref 1.7–7.7)
NEUTROPHILS NFR BLD: 45.4 %
PERFORMED ON: ABNORMAL
PERFORMED ON: NORMAL
PERFORMED ON: NORMAL
PLATELET # BLD AUTO: 202 K/UL (ref 135–450)
PMV BLD AUTO: 7.8 FL (ref 5–10.5)
POTASSIUM SERPL-SCNC: 4 MMOL/L (ref 3.5–5.1)
PROT SERPL-MCNC: 5.8 G/DL (ref 6.4–8.2)
RBC # BLD AUTO: 3.64 M/UL (ref 4–5.2)
SODIUM SERPL-SCNC: 138 MMOL/L (ref 136–145)
TROPONIN, HIGH SENSITIVITY: 29 NG/L (ref 0–14)
WBC # BLD AUTO: 4.9 K/UL (ref 4–11)

## 2025-05-11 PROCEDURE — G0378 HOSPITAL OBSERVATION PER HR: HCPCS

## 2025-05-11 PROCEDURE — 96376 TX/PRO/DX INJ SAME DRUG ADON: CPT

## 2025-05-11 PROCEDURE — 6360000002 HC RX W HCPCS: Performed by: INTERNAL MEDICINE

## 2025-05-11 PROCEDURE — 80053 COMPREHEN METABOLIC PANEL: CPT

## 2025-05-11 PROCEDURE — 2500000003 HC RX 250 WO HCPCS: Performed by: INTERNAL MEDICINE

## 2025-05-11 PROCEDURE — 96372 THER/PROPH/DIAG INJ SC/IM: CPT

## 2025-05-11 PROCEDURE — 97162 PT EVAL MOD COMPLEX 30 MIN: CPT

## 2025-05-11 PROCEDURE — 6370000000 HC RX 637 (ALT 250 FOR IP): Performed by: INTERNAL MEDICINE

## 2025-05-11 PROCEDURE — 97530 THERAPEUTIC ACTIVITIES: CPT

## 2025-05-11 PROCEDURE — 85025 COMPLETE CBC W/AUTO DIFF WBC: CPT

## 2025-05-11 PROCEDURE — 99233 SBSQ HOSP IP/OBS HIGH 50: CPT | Performed by: INTERNAL MEDICINE

## 2025-05-11 PROCEDURE — 84484 ASSAY OF TROPONIN QUANT: CPT

## 2025-05-11 PROCEDURE — 36415 COLL VENOUS BLD VENIPUNCTURE: CPT

## 2025-05-11 RX ORDER — ALBUTEROL SULFATE 90 UG/1
2 INHALANT RESPIRATORY (INHALATION) EVERY 4 HOURS PRN
Status: DISCONTINUED | OUTPATIENT
Start: 2025-05-11 | End: 2025-05-13 | Stop reason: HOSPADM

## 2025-05-11 RX ORDER — DOCUSATE SODIUM 100 MG/1
100 CAPSULE, LIQUID FILLED ORAL 2 TIMES DAILY
Status: DISCONTINUED | OUTPATIENT
Start: 2025-05-11 | End: 2025-05-13 | Stop reason: HOSPADM

## 2025-05-11 RX ORDER — INSULIN LISPRO 100 [IU]/ML
0-4 INJECTION, SOLUTION INTRAVENOUS; SUBCUTANEOUS ONCE
Status: DISCONTINUED | OUTPATIENT
Start: 2025-05-11 | End: 2025-05-13 | Stop reason: HOSPADM

## 2025-05-11 RX ORDER — GLUCAGON 1 MG/ML
1 KIT INJECTION PRN
Status: DISCONTINUED | OUTPATIENT
Start: 2025-05-11 | End: 2025-05-13 | Stop reason: HOSPADM

## 2025-05-11 RX ORDER — INSULIN LISPRO 100 [IU]/ML
0-4 INJECTION, SOLUTION INTRAVENOUS; SUBCUTANEOUS
Status: DISCONTINUED | OUTPATIENT
Start: 2025-05-11 | End: 2025-05-13 | Stop reason: HOSPADM

## 2025-05-11 RX ORDER — METHOCARBAMOL 500 MG/1
750 TABLET, FILM COATED ORAL 3 TIMES DAILY
Status: DISCONTINUED | OUTPATIENT
Start: 2025-05-11 | End: 2025-05-11

## 2025-05-11 RX ORDER — PREDNISONE 20 MG/1
40 TABLET ORAL DAILY
Status: DISCONTINUED | OUTPATIENT
Start: 2025-05-11 | End: 2025-05-13 | Stop reason: HOSPADM

## 2025-05-11 RX ORDER — DEXTROSE MONOHYDRATE 100 MG/ML
INJECTION, SOLUTION INTRAVENOUS CONTINUOUS PRN
Status: DISCONTINUED | OUTPATIENT
Start: 2025-05-11 | End: 2025-05-13 | Stop reason: HOSPADM

## 2025-05-11 RX ORDER — METHOCARBAMOL 500 MG/1
1000 TABLET, FILM COATED ORAL EVERY 6 HOURS
Status: DISCONTINUED | OUTPATIENT
Start: 2025-05-11 | End: 2025-05-13 | Stop reason: HOSPADM

## 2025-05-11 RX ORDER — POLYETHYLENE GLYCOL 3350 17 G/17G
17 POWDER, FOR SOLUTION ORAL 2 TIMES DAILY
Status: DISCONTINUED | OUTPATIENT
Start: 2025-05-11 | End: 2025-05-13 | Stop reason: HOSPADM

## 2025-05-11 RX ORDER — ALBUTEROL SULFATE 0.83 MG/ML
2.5 SOLUTION RESPIRATORY (INHALATION) EVERY 6 HOURS PRN
Status: DISCONTINUED | OUTPATIENT
Start: 2025-05-11 | End: 2025-05-13 | Stop reason: HOSPADM

## 2025-05-11 RX ORDER — LIDOCAINE 4 G/G
1 PATCH TOPICAL DAILY
Status: DISCONTINUED | OUTPATIENT
Start: 2025-05-11 | End: 2025-05-13 | Stop reason: HOSPADM

## 2025-05-11 RX ADMIN — CILOSTAZOL 100 MG: 100 TABLET ORAL at 22:07

## 2025-05-11 RX ADMIN — HYDROMORPHONE HYDROCHLORIDE 1 MG: 1 INJECTION, SOLUTION INTRAMUSCULAR; INTRAVENOUS; SUBCUTANEOUS at 10:49

## 2025-05-11 RX ADMIN — PREDNISONE 40 MG: 20 TABLET ORAL at 17:57

## 2025-05-11 RX ADMIN — DONEPEZIL HYDROCHLORIDE 10 MG: 5 TABLET, FILM COATED ORAL at 21:53

## 2025-05-11 RX ADMIN — ATORVASTATIN CALCIUM 10 MG: 10 TABLET, FILM COATED ORAL at 10:50

## 2025-05-11 RX ADMIN — Medication 10 ML: at 01:08

## 2025-05-11 RX ADMIN — OXYCODONE HYDROCHLORIDE AND ACETAMINOPHEN 1 TABLET: 10; 325 TABLET ORAL at 21:52

## 2025-05-11 RX ADMIN — Medication 3 MG: at 21:53

## 2025-05-11 RX ADMIN — GABAPENTIN 800 MG: 400 CAPSULE ORAL at 21:52

## 2025-05-11 RX ADMIN — POLYETHYLENE GLYCOL (3350) 17 G: 17 POWDER, FOR SOLUTION ORAL at 21:54

## 2025-05-11 RX ADMIN — ENOXAPARIN SODIUM 40 MG: 100 INJECTION SUBCUTANEOUS at 10:52

## 2025-05-11 RX ADMIN — GABAPENTIN 800 MG: 400 CAPSULE ORAL at 15:03

## 2025-05-11 RX ADMIN — Medication 10 ML: at 21:54

## 2025-05-11 RX ADMIN — DONEPEZIL HYDROCHLORIDE 10 MG: 5 TABLET, FILM COATED ORAL at 00:59

## 2025-05-11 RX ADMIN — GABAPENTIN 800 MG: 400 CAPSULE ORAL at 10:50

## 2025-05-11 RX ADMIN — CHOLECALCIFEROL TAB 10 MCG (400 UNIT) 400 UNITS: 10 TAB at 17:57

## 2025-05-11 RX ADMIN — Medication 3 MG: at 00:59

## 2025-05-11 RX ADMIN — OXYCODONE HYDROCHLORIDE AND ACETAMINOPHEN 1 TABLET: 10; 325 TABLET ORAL at 07:50

## 2025-05-11 RX ADMIN — CILOSTAZOL 100 MG: 100 TABLET ORAL at 00:59

## 2025-05-11 RX ADMIN — ASPIRIN 81 MG: 81 TABLET, COATED ORAL at 10:50

## 2025-05-11 RX ADMIN — GABAPENTIN 800 MG: 400 CAPSULE ORAL at 00:59

## 2025-05-11 RX ADMIN — CITALOPRAM HYDROBROMIDE 20 MG: 20 TABLET ORAL at 10:50

## 2025-05-11 RX ADMIN — DICLOFENAC SODIUM 2 G: 10 GEL TOPICAL at 21:55

## 2025-05-11 RX ADMIN — LEVOTHYROXINE SODIUM 125 MCG: 0.12 TABLET ORAL at 06:19

## 2025-05-11 RX ADMIN — HYDROMORPHONE HYDROCHLORIDE 1 MG: 1 INJECTION, SOLUTION INTRAMUSCULAR; INTRAVENOUS; SUBCUTANEOUS at 02:18

## 2025-05-11 RX ADMIN — CILOSTAZOL 100 MG: 100 TABLET ORAL at 11:59

## 2025-05-11 RX ADMIN — METHOCARBAMOL TABLETS 1000 MG: 500 TABLET, COATED ORAL at 17:57

## 2025-05-11 RX ADMIN — HYDROMORPHONE HYDROCHLORIDE 1 MG: 1 INJECTION, SOLUTION INTRAMUSCULAR; INTRAVENOUS; SUBCUTANEOUS at 16:14

## 2025-05-11 RX ADMIN — OXYCODONE HYDROCHLORIDE AND ACETAMINOPHEN 1 TABLET: 10; 325 TABLET ORAL at 00:59

## 2025-05-11 RX ADMIN — MEMANTINE HYDROCHLORIDE 10 MG: 5 TABLET ORAL at 10:51

## 2025-05-11 RX ADMIN — PANTOPRAZOLE SODIUM 40 MG: 40 TABLET, DELAYED RELEASE ORAL at 06:19

## 2025-05-11 RX ADMIN — OXYCODONE HYDROCHLORIDE AND ACETAMINOPHEN 1 TABLET: 10; 325 TABLET ORAL at 15:03

## 2025-05-11 RX ADMIN — DOCUSATE SODIUM 100 MG: 100 CAPSULE, LIQUID FILLED ORAL at 21:52

## 2025-05-11 RX ADMIN — HYDROMORPHONE HYDROCHLORIDE 1 MG: 1 INJECTION, SOLUTION INTRAMUSCULAR; INTRAVENOUS; SUBCUTANEOUS at 06:17

## 2025-05-11 RX ADMIN — Medication 10 ML: at 10:51

## 2025-05-11 ASSESSMENT — PAIN DESCRIPTION - PAIN TYPE
TYPE: ACUTE PAIN;CHRONIC PAIN
TYPE: ACUTE PAIN;CHRONIC PAIN
TYPE: ACUTE PAIN
TYPE: CHRONIC PAIN;ACUTE PAIN
TYPE: ACUTE PAIN;CHRONIC PAIN

## 2025-05-11 ASSESSMENT — PAIN DESCRIPTION - ORIENTATION
ORIENTATION: LOWER;LEFT
ORIENTATION: LEFT;LOWER
ORIENTATION: LEFT;UPPER;OUTER
ORIENTATION: LEFT
ORIENTATION: LEFT;LOWER

## 2025-05-11 ASSESSMENT — PAIN SCALES - GENERAL
PAINLEVEL_OUTOF10: 4
PAINLEVEL_OUTOF10: 7
PAINLEVEL_OUTOF10: 7
PAINLEVEL_OUTOF10: 5
PAINLEVEL_OUTOF10: 7
PAINLEVEL_OUTOF10: 5
PAINLEVEL_OUTOF10: 8
PAINLEVEL_OUTOF10: 6
PAINLEVEL_OUTOF10: 10
PAINLEVEL_OUTOF10: 7
PAINLEVEL_OUTOF10: 10
PAINLEVEL_OUTOF10: 4
PAINLEVEL_OUTOF10: 6
PAINLEVEL_OUTOF10: 9

## 2025-05-11 ASSESSMENT — PAIN DESCRIPTION - LOCATION
LOCATION: BACK
LOCATION: LEG
LOCATION: BACK
LOCATION: BACK;HIP;LEG
LOCATION: BACK

## 2025-05-11 ASSESSMENT — PAIN DESCRIPTION - ONSET
ONSET: ON-GOING
ONSET: SUDDEN

## 2025-05-11 ASSESSMENT — PAIN DESCRIPTION - FREQUENCY
FREQUENCY: CONTINUOUS
FREQUENCY: INTERMITTENT
FREQUENCY: CONTINUOUS

## 2025-05-11 ASSESSMENT — PAIN DESCRIPTION - DESCRIPTORS
DESCRIPTORS: ACHING;DISCOMFORT;SHARP;SHOOTING;SORE
DESCRIPTORS: ACHING;DISCOMFORT;SHARP;SORE;SHOOTING
DESCRIPTORS: PATIENT UNABLE TO DESCRIBE
DESCRIPTORS: ACHING;DISCOMFORT;SORE;TENDER;PRESSURE
DESCRIPTORS: BURNING;THROBBING;OTHER (COMMENT)
DESCRIPTORS: ACHING;DISCOMFORT;SORE;TENDER;PRESSURE

## 2025-05-11 ASSESSMENT — PAIN - FUNCTIONAL ASSESSMENT
PAIN_FUNCTIONAL_ASSESSMENT: PREVENTS OR INTERFERES SOME ACTIVE ACTIVITIES AND ADLS

## 2025-05-11 ASSESSMENT — PAIN DESCRIPTION - DIRECTION
RADIATING_TOWARDS: LEG
RADIATING_TOWARDS: LEG

## 2025-05-11 NOTE — ED NOTES
2032 Placed an ADT20 order to consult [facility] neurosurgery, waiting for a call back

## 2025-05-11 NOTE — FLOWSHEET NOTE
Pt A/Ox4. VSS. Pain 8/10, see assessment below. +facial grimacing. Pt unlabored; respirations even, regular, effortless. RA. No distress noted. Shift assessment complete. See flowsheet. AM med's given. See MAR. Repositioned patient to right side. Denies needs at this time. Bed alarm on. Side rails 2/4. Bed in low position. Call light within reach.     Bedside Mobility Assessment Tool (BMAT):     Assessment Level 1- Sit and Shake    1. From a semi-reclined position, ask patient to sit up and rotate to a seated position at the side of the bed. Can use the bedrail.    2. Ask patient to reach out and grab your hand and shake making sure patient reaches across his/her midline.   Fail- Patient is unable to perform tasks, patient is MOBILITY LEVEL 1.  RAKEL-In too much pain to assess       05/11/25 1047   Vital Signs   Temp 97.5 °F (36.4 °C)   Temp Source Oral   Pulse 61   Heart Rate Source Monitor   Respirations 18   /71   MAP (Calculated) 90   BP Location Left upper arm   BP Method Automatic   Patient Position Semi wlers   Pain Assessment   Pain Assessment 0-10   Pain Level 8   Patient's Stated Pain Goal 0 - No pain   Pain Location Back   Pain Orientation Left;Lower   Pain Descriptors Burning;Throbbing;Other (Comment)  (twisting)   Pain Type Chronic pain;Acute pain   Pain Frequency Continuous   Pain Onset On-going   Non-Pharmaceutical Pain Intervention(s) Repositioned   Care Plan - Pain Goals   Verbalizes/displays adequate comfort level or baseline comfort level Encourage patient to monitor pain and request assistance;Assess pain using appropriate pain scale;Administer analgesics based on type and severity of pain and evaluate response;Implement non-pharmacological measures as appropriate and evaluate response   Opioid-Induced Sedation   POSS Score 1   Oxygen Therapy   SpO2 93 %   Pulse Oximetry Type Intermittent   O2 Device None (Room air)

## 2025-05-11 NOTE — H&P
Hospital Medicine History & Physical      PCP: Noa Cardoza PA    Date of Admission: 5/10/2025    Date of Service: Pt seen/examined on 5/10/2025    Pt seen/examined face to face on and admitted as inpatient with expected LOS to be two days but can change depending on diagnostic work up and treatment response.     Chief Complaint:    Chief Complaint   Patient presents with    Back Pain     Pt in by EMS from [facility]. Pt has hx of chronic back pain. Pt has had worse lower back pain the last 3 days and can't get out of bed. Pt has some burning with urination.             ASSESSMENT AND PLAN:    Active Hospital Problems    Diagnosis Date Noted    Sciatic pain, left [M54.32] 05/10/2025     Left sciatic pain:  Gabapentin ordered  Neurosurgery consulted to be transferred for further evaluation recommendation was to stay in the [facility] as patient is not a surgical candidate  Pain control  PT OT      Hypothyroidism: Continue home medication  Hyperlipidemia: Controlled on home Statin. Outpatient PCP follow up post-discharge  GERD: Continue home medication  Essential Hypertension: Reviewed patient's medications and plan is to continue home medication  Class II obesity: Complicating assessment and treatment. Placing patient at risk for multiple co-morbidities as well as early death and contributing to the patient's presentation. Education, and counseling     .Due to the above diagnosis makes the patient higher risk for morbidity and mortality requiring testing and treatment      Discussion with the primary ER physician in regards to symptoms, history, physical exam, diagnosis and treatment, collaborative decision was to admit the patient.    Diet: regular diet    DVT Prophylaxis: lovenox    Dispo:   Expected LOS of two days      History Of Present Illness:      85 y.o. female who presented to [facility] with past medical history of type 2 diabetes, fall, fibromyalgia, hypertension, osteoarthritis

## 2025-05-11 NOTE — ED PROVIDER NOTES
THIS IS MY FELECIA SUPERVISORY AND SHARED VISIT NOTE:    I personally saw the patient and made/approved the management plan and take responsibility for the patient management.    History: 85-year-old female presenting for evaluation of back pain.  Patient has had longstanding history of back pain.  She is from local nursing facility, lives on assisted living facility side.  She reports worsening lower back pain for the past several days.  She has not been able to get out of bed because of the pain.  She has had multiple recent MRIs over the last few weeks including of the lumbar spine, of the hip without acute surgical pathology.  No fevers.    Exam: Intact sensation to the bilateral lower extremities.  Alert, oriented, no respiratory distress, able to move bilateral lower extremities although with pain.    MDM: 85-year-old female presenting for evaluation of acute on chronic low back pain.  She has had recent MRI imaging to rule out spinal epidural compression syndrome.  Laboratory evaluation is overall unremarkable.  CT abdomen pelvis was performed without acute abnormality of the abdomen and pelvis.  As she has had recent MRI imaging without definitive change in symptoms, repeat MRI imaging is not indicated at this time.  Plan is for patient to be admitted for PT OT evaluation and likely rehab placement, patient and family agreeable with this plan        I personally saw the patient and independently provided 0 minutes of non-concurrent critical care out of the total shared critical care time provided.     No results found.      I, Dr. Albarado, am the primary clinician of record.     Comment: Please note this report has been produced using speech recognition software and may contain errors related to that system including errors in grammar, punctuation, and spelling, as well as words and phrases that may be inappropriate. If there are any questions or concerns please feel free to contact the dictating provider for

## 2025-05-11 NOTE — FLOWSHEET NOTE
Admission assessment complete. See doc flow. Nightly medications given see MAR. A/O x4. From [facility]/ Assisted Living. Worsening lower left-sided back pain for the past several days. Difficulty w/ ambulation and getting in & out of bed. Bath given w/ CHG wipes. Pure-wick applied. Clean gown and socks applied. Skin folds to breasts and abd pannus cleansed, patted dry and IntraDry applied. C/o lower back pain PRN Percocet given.Patient repositioned for comfort. Bed alarm in place. Call light and bedside table within easy reach.    05/11/25 0027   Vital Signs   Temp 97.8 °F (36.6 °C)   Temp Source Oral   Pulse 73   Heart Rate Source Monitor   Respirations 16   BP (!) 189/79   MAP (Calculated) 116   BP Location Right lower arm   BP Method Automatic   Patient Position Semi fowlers   Oxygen Therapy   SpO2 94 %   O2 Device None (Room air)   Height and Weight   Height 1.499 m (4' 11\")   Weight - Scale 97.6 kg (215 lb 1.6 oz)   Weight Method Actual;Bed scale   BSA (Calculated - sq m) 2.02 sq meters   BMI (Calculated) 43.5

## 2025-05-12 ENCOUNTER — APPOINTMENT (OUTPATIENT)
Dept: ULTRASOUND IMAGING | Age: 85
DRG: 552 | End: 2025-05-12
Payer: MEDICARE

## 2025-05-12 LAB
ALBUMIN SERPL-MCNC: 3.3 G/DL (ref 3.4–5)
ALBUMIN/GLOB SERPL: 1.4 {RATIO} (ref 1.1–2.2)
ALP SERPL-CCNC: 83 U/L (ref 40–129)
ALT SERPL-CCNC: 10 U/L (ref 10–40)
ANION GAP SERPL CALCULATED.3IONS-SCNC: 9 MMOL/L (ref 3–16)
AST SERPL-CCNC: 18 U/L (ref 15–37)
BASOPHILS # BLD: 0 K/UL (ref 0–0.2)
BASOPHILS NFR BLD: 0.1 %
BILIRUB SERPL-MCNC: <0.2 MG/DL (ref 0–1)
BILIRUB UR QL STRIP.AUTO: NEGATIVE
BUN SERPL-MCNC: 16 MG/DL (ref 7–20)
CALCIUM SERPL-MCNC: 9.2 MG/DL (ref 8.3–10.6)
CHLORIDE SERPL-SCNC: 104 MMOL/L (ref 99–110)
CLARITY UR: CLEAR
CO2 SERPL-SCNC: 25 MMOL/L (ref 21–32)
COLOR UR: YELLOW
CREAT SERPL-MCNC: 0.7 MG/DL (ref 0.6–1.2)
DEPRECATED RDW RBC AUTO: 14.3 % (ref 12.4–15.4)
EOSINOPHIL # BLD: 0 K/UL (ref 0–0.6)
EOSINOPHIL NFR BLD: 0 %
EST. AVERAGE GLUCOSE BLD GHB EST-MCNC: 114 MG/DL
GFR SERPLBLD CREATININE-BSD FMLA CKD-EPI: 85 ML/MIN/{1.73_M2}
GLUCOSE BLD-MCNC: 134 MG/DL (ref 70–99)
GLUCOSE BLD-MCNC: 151 MG/DL (ref 70–99)
GLUCOSE BLD-MCNC: 159 MG/DL (ref 70–99)
GLUCOSE BLD-MCNC: 164 MG/DL (ref 70–99)
GLUCOSE BLD-MCNC: 172 MG/DL (ref 70–99)
GLUCOSE SERPL-MCNC: 144 MG/DL (ref 70–99)
GLUCOSE UR STRIP.AUTO-MCNC: 250 MG/DL
HBA1C MFR BLD: 5.6 %
HCT VFR BLD AUTO: 35.1 % (ref 36–48)
HGB BLD-MCNC: 11.9 G/DL (ref 12–16)
HGB UR QL STRIP.AUTO: NEGATIVE
KETONES UR STRIP.AUTO-MCNC: NEGATIVE MG/DL
LEUKOCYTE ESTERASE UR QL STRIP.AUTO: NEGATIVE
LYMPHOCYTES # BLD: 0.6 K/UL (ref 1–5.1)
LYMPHOCYTES NFR BLD: 23.7 %
MCH RBC QN AUTO: 31.5 PG (ref 26–34)
MCHC RBC AUTO-ENTMCNC: 33.8 G/DL (ref 31–36)
MCV RBC AUTO: 93.3 FL (ref 80–100)
MONOCYTES # BLD: 0.1 K/UL (ref 0–1.3)
MONOCYTES NFR BLD: 2.3 %
NEUTROPHILS # BLD: 2 K/UL (ref 1.7–7.7)
NEUTROPHILS NFR BLD: 73.9 %
NITRITE UR QL STRIP.AUTO: NEGATIVE
PERFORMED ON: ABNORMAL
PH UR STRIP.AUTO: 6 [PH] (ref 5–8)
PLATELET # BLD AUTO: 227 K/UL (ref 135–450)
PMV BLD AUTO: 7.8 FL (ref 5–10.5)
POTASSIUM SERPL-SCNC: 4.6 MMOL/L (ref 3.5–5.1)
PROT SERPL-MCNC: 5.7 G/DL (ref 6.4–8.2)
PROT UR STRIP.AUTO-MCNC: NEGATIVE MG/DL
RBC # BLD AUTO: 3.76 M/UL (ref 4–5.2)
SODIUM SERPL-SCNC: 138 MMOL/L (ref 136–145)
SP GR UR STRIP.AUTO: 1.01 (ref 1–1.03)
UA COMPLETE W REFLEX CULTURE PNL UR: ABNORMAL
UA DIPSTICK W REFLEX MICRO PNL UR: ABNORMAL
URN SPEC COLLECT METH UR: ABNORMAL
UROBILINOGEN UR STRIP-ACNC: 0.2 E.U./DL
WBC # BLD AUTO: 2.7 K/UL (ref 4–11)

## 2025-05-12 PROCEDURE — 80053 COMPREHEN METABOLIC PANEL: CPT

## 2025-05-12 PROCEDURE — 6370000000 HC RX 637 (ALT 250 FOR IP): Performed by: INTERNAL MEDICINE

## 2025-05-12 PROCEDURE — 36415 COLL VENOUS BLD VENIPUNCTURE: CPT

## 2025-05-12 PROCEDURE — 85025 COMPLETE CBC W/AUTO DIFF WBC: CPT

## 2025-05-12 PROCEDURE — 81003 URINALYSIS AUTO W/O SCOPE: CPT

## 2025-05-12 PROCEDURE — 97165 OT EVAL LOW COMPLEX 30 MIN: CPT

## 2025-05-12 PROCEDURE — 83036 HEMOGLOBIN GLYCOSYLATED A1C: CPT

## 2025-05-12 PROCEDURE — 97110 THERAPEUTIC EXERCISES: CPT

## 2025-05-12 PROCEDURE — 6360000002 HC RX W HCPCS: Performed by: INTERNAL MEDICINE

## 2025-05-12 PROCEDURE — 51798 US URINE CAPACITY MEASURE: CPT

## 2025-05-12 PROCEDURE — 97530 THERAPEUTIC ACTIVITIES: CPT

## 2025-05-12 PROCEDURE — 2500000003 HC RX 250 WO HCPCS: Performed by: INTERNAL MEDICINE

## 2025-05-12 PROCEDURE — G0378 HOSPITAL OBSERVATION PER HR: HCPCS

## 2025-05-12 PROCEDURE — 76770 US EXAM ABDO BACK WALL COMP: CPT

## 2025-05-12 PROCEDURE — 51701 INSERT BLADDER CATHETER: CPT

## 2025-05-12 PROCEDURE — 99232 SBSQ HOSP IP/OBS MODERATE 35: CPT | Performed by: INTERNAL MEDICINE

## 2025-05-12 PROCEDURE — 96376 TX/PRO/DX INJ SAME DRUG ADON: CPT

## 2025-05-12 PROCEDURE — 96372 THER/PROPH/DIAG INJ SC/IM: CPT

## 2025-05-12 RX ADMIN — GABAPENTIN 800 MG: 400 CAPSULE ORAL at 20:28

## 2025-05-12 RX ADMIN — Medication 10 ML: at 10:24

## 2025-05-12 RX ADMIN — ASPIRIN 81 MG: 81 TABLET, COATED ORAL at 10:22

## 2025-05-12 RX ADMIN — ENOXAPARIN SODIUM 40 MG: 100 INJECTION SUBCUTANEOUS at 10:21

## 2025-05-12 RX ADMIN — Medication 10 ML: at 20:32

## 2025-05-12 RX ADMIN — METHOCARBAMOL TABLETS 1000 MG: 500 TABLET, COATED ORAL at 16:06

## 2025-05-12 RX ADMIN — PREDNISONE 40 MG: 20 TABLET ORAL at 10:22

## 2025-05-12 RX ADMIN — OXYCODONE HYDROCHLORIDE AND ACETAMINOPHEN 1 TABLET: 10; 325 TABLET ORAL at 05:09

## 2025-05-12 RX ADMIN — METHOCARBAMOL TABLETS 1000 MG: 500 TABLET, COATED ORAL at 03:01

## 2025-05-12 RX ADMIN — LEVOTHYROXINE SODIUM 125 MCG: 0.12 TABLET ORAL at 05:09

## 2025-05-12 RX ADMIN — GABAPENTIN 800 MG: 400 CAPSULE ORAL at 16:06

## 2025-05-12 RX ADMIN — CHOLECALCIFEROL TAB 10 MCG (400 UNIT) 400 UNITS: 10 TAB at 10:22

## 2025-05-12 RX ADMIN — DICLOFENAC SODIUM 2 G: 10 GEL TOPICAL at 10:25

## 2025-05-12 RX ADMIN — POLYETHYLENE GLYCOL (3350) 17 G: 17 POWDER, FOR SOLUTION ORAL at 20:30

## 2025-05-12 RX ADMIN — PANTOPRAZOLE SODIUM 40 MG: 40 TABLET, DELAYED RELEASE ORAL at 05:09

## 2025-05-12 RX ADMIN — POLYETHYLENE GLYCOL (3350) 17 G: 17 POWDER, FOR SOLUTION ORAL at 10:25

## 2025-05-12 RX ADMIN — CILOSTAZOL 100 MG: 100 TABLET ORAL at 10:34

## 2025-05-12 RX ADMIN — ATORVASTATIN CALCIUM 10 MG: 10 TABLET, FILM COATED ORAL at 10:22

## 2025-05-12 RX ADMIN — MEMANTINE HYDROCHLORIDE 10 MG: 5 TABLET ORAL at 10:22

## 2025-05-12 RX ADMIN — GABAPENTIN 800 MG: 400 CAPSULE ORAL at 10:21

## 2025-05-12 RX ADMIN — DOCUSATE SODIUM 100 MG: 100 CAPSULE, LIQUID FILLED ORAL at 10:22

## 2025-05-12 RX ADMIN — NIFEDIPINE 30 MG: 30 TABLET, FILM COATED, EXTENDED RELEASE ORAL at 10:22

## 2025-05-12 RX ADMIN — CILOSTAZOL 100 MG: 100 TABLET ORAL at 20:35

## 2025-05-12 RX ADMIN — METHOCARBAMOL TABLETS 1000 MG: 500 TABLET, COATED ORAL at 20:28

## 2025-05-12 RX ADMIN — METHOCARBAMOL TABLETS 1000 MG: 500 TABLET, COATED ORAL at 10:23

## 2025-05-12 RX ADMIN — CITALOPRAM HYDROBROMIDE 20 MG: 20 TABLET ORAL at 10:24

## 2025-05-12 RX ADMIN — LOSARTAN POTASSIUM 50 MG: 50 TABLET, FILM COATED ORAL at 10:22

## 2025-05-12 RX ADMIN — HYDROMORPHONE HYDROCHLORIDE 1 MG: 1 INJECTION, SOLUTION INTRAMUSCULAR; INTRAVENOUS; SUBCUTANEOUS at 02:38

## 2025-05-12 RX ADMIN — DOCUSATE SODIUM 100 MG: 100 CAPSULE, LIQUID FILLED ORAL at 20:28

## 2025-05-12 RX ADMIN — DONEPEZIL HYDROCHLORIDE 10 MG: 5 TABLET, FILM COATED ORAL at 20:28

## 2025-05-12 ASSESSMENT — PAIN DESCRIPTION - LOCATION
LOCATION: BACK;HIP;LEG
LOCATION: BACK
LOCATION: BACK;LEG;HIP
LOCATION: BACK;LEG;HIP

## 2025-05-12 ASSESSMENT — PAIN DESCRIPTION - ORIENTATION
ORIENTATION: LEFT;LOWER
ORIENTATION: LEFT
ORIENTATION: LOWER;LEFT
ORIENTATION: LEFT;LOWER

## 2025-05-12 ASSESSMENT — PAIN SCALES - GENERAL
PAINLEVEL_OUTOF10: 0
PAINLEVEL_OUTOF10: 10
PAINLEVEL_OUTOF10: 7
PAINLEVEL_OUTOF10: 8
PAINLEVEL_OUTOF10: 9

## 2025-05-12 ASSESSMENT — PAIN DESCRIPTION - DESCRIPTORS
DESCRIPTORS: ACHING;DISCOMFORT;BURNING;SHARP;SORE
DESCRIPTORS: BURNING;THROBBING
DESCRIPTORS: ACHING;DISCOMFORT;BURNING;SHARP
DESCRIPTORS: ACHING;DISCOMFORT;SHARP;SORE;SHOOTING

## 2025-05-12 ASSESSMENT — PAIN - FUNCTIONAL ASSESSMENT
PAIN_FUNCTIONAL_ASSESSMENT: PREVENTS OR INTERFERES SOME ACTIVE ACTIVITIES AND ADLS

## 2025-05-12 ASSESSMENT — PAIN DESCRIPTION - FREQUENCY
FREQUENCY: CONTINUOUS

## 2025-05-12 ASSESSMENT — PAIN DESCRIPTION - ONSET
ONSET: ON-GOING

## 2025-05-12 ASSESSMENT — PAIN DESCRIPTION - PAIN TYPE
TYPE: ACUTE PAIN;CHRONIC PAIN

## 2025-05-12 NOTE — PLAN OF CARE
KM nocturnist note    Notified patient has urinary retention.  Patient does have a history of this since her back surgeries couple years ago, although it seems more associated with postoperative status which the patient is not currently.  Patient was admitted with back pain patient did have an incontinent episode and so is urinating however there was still urinary retention.  The patient has been receiving 1 mg of IV Dilaudid approximately every 6 hours or so which can cause acute urinary retention.  Patient is also on high doses of gabapentin which has been known to cause urinary retention when combined with opioid.    Will straight cath patient tonight  Send urinalysis  Check renal ultrasound to ensure there is no significant hydronephrosis from a bladder outlet obstruction

## 2025-05-12 NOTE — FLOWSHEET NOTE
05/12/25 1731   Treatment Team Notification   Reason for Communication Evaluate  ( mL.)   Name of Team Member Notified Dr. Hanna   Treatment Team Role Attending Provider   Method of Communication Secure Message   Response See orders  (PRN straight cath PVR >250)   Notification Time 1731     Pt voided 150 mL via bedside commode. Performed bladder scan,  mL. No suprapubic pain/pressure. Notified Dr. Hanna.     1808: Received ordered for PRN suprapubic cath for PVR >250 mL.

## 2025-05-12 NOTE — FLOWSHEET NOTE
05/12/25 1842   Urine Assessment   Intermittent/Straight Cath (mL) 400 mL   $ Cath urethra straight $ Yes     Performed intermittent straight cath. Urine returned, 400 mL output.

## 2025-05-12 NOTE — FLOWSHEET NOTE
05/12/25 0802   Urine Assessment   Bladder Scan Volume (mL) 155 mL   $ Bladder scan $ Yes     Pt had urinary retention on previous shift with x1 intermittent straight cath. Performed bladder scan, estimated volume 155 mL.

## 2025-05-12 NOTE — FLOWSHEET NOTE
Patient awake in bed. C/o Lt sided lower back, hip and leg pain PRN Dilaudid given. No other needs noted at this time. Bed alarm in place. Call light and bedside table within reach.    05/12/25 0228   Vital Signs   Temp 98.2 °F (36.8 °C)   Temp Source Oral   Pulse 90   Heart Rate Source Monitor   Respirations 18   BP (!) 153/88   MAP (Calculated) 110   BP Location Right lower arm   BP Method Automatic   Patient Position Semi fowlers   Oxygen Therapy   SpO2 94 %   O2 Device None (Room air)

## 2025-05-12 NOTE — FLOWSHEET NOTE
Shift assessment complete. See doc flow. Nightly medications given see MAR. A/O x4. Hx of dementia, forgetful at times. C/o Lt lower side back pain PRN Percocet given. PRN Voltaren applied to Lt lower back and Lt hip area. Patient c/o that her labia is burning and she is having difficulty try to urinate. Labia skin is slightly swollen and excoriated, area cleansed with barrier cream wipes and pure-wick will remain off. Placed patient on a bed pan for a time, patient was still unable to urinate. Will bladder scan if continues. Patient repositioned for comfort. Bed alarm in place. Call light and bedside table within easy reach.    05/11/25 2134   Vital Signs   Temp 98.4 °F (36.9 °C)   Temp Source Oral   Pulse 66   Heart Rate Source Monitor   Respirations 16   BP (!) 158/74   MAP (Calculated) 102   BP Location Right lower arm   BP Method Automatic   Patient Position Semi fowlers   Oxygen Therapy   SpO2 93 %   O2 Device None (Room air)

## 2025-05-12 NOTE — FLOWSHEET NOTE
Pt A/Ox4. VSS. Pain 8/10, see assessment below. Pt unlabored; respirations even, regular, effortless. RA. No distress noted. Shift assessment complete. See flowsheet. AM med's given. See MAR. Holding off on PO pain medication d/t scheduled Gabapentin & Robaxin. Pt aware of reasoning and verbalized understanding. Denies needs at this time. Bed alarm on. Side rails 2/4. Bed in low position. Call light within reach.     Bedside Mobility Assessment Tool (BMAT):     Assessment Level 1- Sit and Shake    1. From a semi-reclined position, ask patient to sit up and rotate to a seated position at the side of the bed. Can use the bedrail.    2. Ask patient to reach out and grab your hand and shake making sure patient reaches across his/her midline.   Pass- Patient is able to come to a seated position, maintain core strength. Maintains seated balance while reaching across midline. Move on to Assessment Level 2.     Assessment Level 2- Stretch and Point   1. With patient in seated position at the side of the bed, have patient place both feet on the floor (or stool) with knees no higher than hips.    2. Ask patient to stretch one leg and straighten the knee, then bend the ankle/flex and point the toes. If appropriate, repeat with the other leg.   Pass- Patient is able to demonstrate appropriate quad strength on intended weight bearing limb(s). Move onto Assessment Level 3.     Assessment Level 3- Stand   1. Ask patient to elevate off the bed or chair (seated to standing) using an assistive device (cane, bedrail).    2. Patient should be able to raise buttocks off be and hold for a count of five. May repeat once.   Pass- Patient maintains standing stability for at least 5 seconds, proceed to assessment level 4.    Assessment Level 4- Walk   1. Ask patient to march in place at bedside.    2. Then ask patient to advance step and return each foot. Some medical conditions may render a patient from stepping backwards, use your best

## 2025-05-12 NOTE — CARE COORDINATION
Case Management Assessment  Initial Evaluation    Date/Time of Evaluation: 5/12/2025 10:32 AM  Assessment Completed by: ANÍBAL Reno    If patient is discharged prior to next notation, then this note serves as note for discharge by case management.    Patient Name: Courtney Taylor                   YOB: 1940  Diagnosis: Intractable low back pain [M54.59]  Sciatic pain, left [M54.32]                   Date / Time: 5/10/2025  4:16 PM    Patient Admission Status: Observation   Readmission Risk (Low < 19, Mod (19-27), High > 27): No data recorded  Current PCP: Noa Cardoza PA  PCP verified by CM? Yes    Chart Reviewed: Yes      History Provided by: Patient, Medical Record  Patient Orientation: Alert and Oriented    Patient Cognition: Alert    Hospitalization in the last 30 days (Readmission):  No    If yes, Readmission Assessment in CM Navigator will be completed.    Advance Directives:      Code Status: Full Code   Patient's Primary Decision Maker is: Legal Next of Kin    Primary Decision Maker: Thalia Hoffman - Child - [phone]    Secondary Decision Maker: Darren Taylor - Child - [phone]    Discharge Planning:    Patient lives with: Alone Type of Home: Independent Living  Primary Care Giver: Self  Patient Support Systems include: Children, Family Members   Current Financial resources: Medicare  Current community resources: [facility] (Comment[facility] ([facility])  Current services prior to admission: None            Current DME:              Type of Home Care services:  None    ADLS  Prior functional level: Independent in ADLs/IADLs  Current functional level: Assistance with the following:, Bathing, Dressing, Toileting, Cooking, Housework, Shopping, Mobility    PT AM-PAC:   /24  OT AM-PAC:   /24    Family can provide assistance at DC: No  Would you like Case Management to discuss the discharge plan with any other family members/significant others, and if so, who? Yes

## 2025-05-12 NOTE — PLAN OF CARE
Problem: Chronic Conditions and Co-morbidities  Goal: Patient's chronic conditions and co-morbidity symptoms are monitored and maintained or improved  5/12/2025 1148 by Selena Galeas RN  Outcome: Progressing  5/11/2025 2249 by Atif Acevedo RN  Outcome: Progressing     Problem: Discharge Planning  Goal: Discharge to home or other facility with appropriate resources  5/12/2025 1148 by Selena Galeas RN  Outcome: Progressing  5/11/2025 2249 by Atif Acevedo RN  Outcome: Progressing     Problem: Pain  Goal: Verbalizes/displays adequate comfort level or baseline comfort level  5/12/2025 1148 by Selena Galeas RN  Outcome: Progressing  Flowsheets (Taken 5/12/2025 1017)  Verbalizes/displays adequate comfort level or baseline comfort level:   Encourage patient to monitor pain and request assistance   Assess pain using appropriate pain scale   Administer analgesics based on type and severity of pain and evaluate response   Implement non-pharmacological measures as appropriate and evaluate response  5/11/2025 2249 by Atif Acevedo RN  Outcome: Progressing     Problem: Safety - Adult  Goal: Free from fall injury  5/12/2025 1148 by Selena Galeas RN  Outcome: Progressing  5/11/2025 2249 by Atif Acevedo RN  Outcome: Progressing     Problem: Skin/Tissue Integrity  Goal: Skin integrity remains intact  Description: 1.  Monitor for areas of redness and/or skin breakdown2.  Assess vascular access sites hourly3.  Every 4-6 hours minimum:  Change oxygen saturation probe site4.  Every 4-6 hours:  If on nasal continuous positive airway pressure, respiratory therapy assess nares and determine need for appliance change or resting period  5/12/2025 1148 by Selena Galeas RN  Outcome: Progressing  5/11/2025 2249 by Atif Acevedo RN  Outcome: Progressing     Problem: Skin/Tissue Integrity - Adult  Goal: Skin integrity remains intact  Description: 1.  Monitor for areas of redness and/or skin breakdown2.

## 2025-05-12 NOTE — PLAN OF CARE
Problem: Chronic Conditions and Co-morbidities  Goal: Patient's chronic conditions and co-morbidity symptoms are monitored and maintained or improved  5/11/2025 2249 by Atif Acevedo RN  Outcome: Progressing  5/11/2025 1858 by Selena Galeas RN  Outcome: Progressing     Problem: Discharge Planning  Goal: Discharge to home or other facility with appropriate resources  5/11/2025 2249 by Atif Acevedo RN  Outcome: Progressing  5/11/2025 1858 by Selena Galeas RN  Outcome: Progressing     Problem: Pain  Goal: Verbalizes/displays adequate comfort level or baseline comfort level  5/11/2025 2249 by Atif Acevedo RN  Outcome: Progressing  5/11/2025 1858 by Selena Galeas RN  Outcome: Not Progressing  Flowsheets (Taken 5/11/2025 1047)  Verbalizes/displays adequate comfort level or baseline comfort level:   Encourage patient to monitor pain and request assistance   Assess pain using appropriate pain scale   Administer analgesics based on type and severity of pain and evaluate response   Implement non-pharmacological measures as appropriate and evaluate response     Problem: Safety - Adult  Goal: Free from fall injury  5/11/2025 2249 by Atif Acevedo RN  Outcome: Progressing  5/11/2025 1858 by Selena Galeas RN  Outcome: Progressing     Problem: Skin/Tissue Integrity  Goal: Skin integrity remains intact  Description: 1.  Monitor for areas of redness and/or skin breakdown2.  Assess vascular access sites hourly3.  Every 4-6 hours minimum:  Change oxygen saturation probe site4.  Every 4-6 hours:  If on nasal continuous positive airway pressure, respiratory therapy assess nares and determine need for appliance change or resting period  5/11/2025 2249 by Atif Acevedo RN  Outcome: Progressing  5/11/2025 1858 by Selena Galeas RN  Outcome: Progressing     Problem: Pain  Goal: Verbalizes/displays adequate comfort level or baseline comfort level  5/11/2025 2249 by Atif Acevedo RN  Outcome:

## 2025-05-13 VITALS
TEMPERATURE: 98.2 F | OXYGEN SATURATION: 93 % | HEIGHT: 59 IN | DIASTOLIC BLOOD PRESSURE: 80 MMHG | BODY MASS INDEX: 44.21 KG/M2 | HEART RATE: 87 BPM | SYSTOLIC BLOOD PRESSURE: 149 MMHG | RESPIRATION RATE: 18 BRPM | WEIGHT: 219.3 LBS

## 2025-05-13 PROBLEM — I10 BENIGN ESSENTIAL HTN: Status: ACTIVE | Noted: 2025-05-13

## 2025-05-13 PROBLEM — M54.16 LUMBAR RADICULOPATHY: Status: ACTIVE | Noted: 2025-05-13

## 2025-05-13 LAB
ALBUMIN SERPL-MCNC: 3.1 G/DL (ref 3.4–5)
ALBUMIN/GLOB SERPL: 1.3 {RATIO} (ref 1.1–2.2)
ALP SERPL-CCNC: 72 U/L (ref 40–129)
ALT SERPL-CCNC: 8 U/L (ref 10–40)
ANION GAP SERPL CALCULATED.3IONS-SCNC: 8 MMOL/L (ref 3–16)
AST SERPL-CCNC: 17 U/L (ref 15–37)
BASOPHILS # BLD: 0 K/UL (ref 0–0.2)
BASOPHILS NFR BLD: 0.3 %
BILIRUB SERPL-MCNC: <0.2 MG/DL (ref 0–1)
BUN SERPL-MCNC: 17 MG/DL (ref 7–20)
CALCIUM SERPL-MCNC: 9.2 MG/DL (ref 8.3–10.6)
CHLORIDE SERPL-SCNC: 107 MMOL/L (ref 99–110)
CO2 SERPL-SCNC: 26 MMOL/L (ref 21–32)
CREAT SERPL-MCNC: 0.7 MG/DL (ref 0.6–1.2)
DEPRECATED RDW RBC AUTO: 14.4 % (ref 12.4–15.4)
EOSINOPHIL # BLD: 0 K/UL (ref 0–0.6)
EOSINOPHIL NFR BLD: 0 %
GFR SERPLBLD CREATININE-BSD FMLA CKD-EPI: 85 ML/MIN/{1.73_M2}
GLUCOSE BLD-MCNC: 88 MG/DL (ref 70–99)
GLUCOSE BLD-MCNC: 98 MG/DL (ref 70–99)
GLUCOSE SERPL-MCNC: 122 MG/DL (ref 70–99)
HCT VFR BLD AUTO: 32.6 % (ref 36–48)
HGB BLD-MCNC: 10.8 G/DL (ref 12–16)
LYMPHOCYTES # BLD: 1.3 K/UL (ref 1–5.1)
LYMPHOCYTES NFR BLD: 20.9 %
MCH RBC QN AUTO: 30.6 PG (ref 26–34)
MCHC RBC AUTO-ENTMCNC: 33.1 G/DL (ref 31–36)
MCV RBC AUTO: 92.2 FL (ref 80–100)
MONOCYTES # BLD: 0.8 K/UL (ref 0–1.3)
MONOCYTES NFR BLD: 12.5 %
NEUTROPHILS # BLD: 4.2 K/UL (ref 1.7–7.7)
NEUTROPHILS NFR BLD: 66.3 %
PERFORMED ON: NORMAL
PERFORMED ON: NORMAL
PLATELET # BLD AUTO: 252 K/UL (ref 135–450)
PMV BLD AUTO: 7.7 FL (ref 5–10.5)
POTASSIUM SERPL-SCNC: 3.9 MMOL/L (ref 3.5–5.1)
PROT SERPL-MCNC: 5.4 G/DL (ref 6.4–8.2)
RBC # BLD AUTO: 3.53 M/UL (ref 4–5.2)
SODIUM SERPL-SCNC: 141 MMOL/L (ref 136–145)
WBC # BLD AUTO: 6.4 K/UL (ref 4–11)

## 2025-05-13 PROCEDURE — 99238 HOSP IP/OBS DSCHRG MGMT 30/<: CPT | Performed by: INTERNAL MEDICINE

## 2025-05-13 PROCEDURE — 2500000003 HC RX 250 WO HCPCS: Performed by: INTERNAL MEDICINE

## 2025-05-13 PROCEDURE — 6370000000 HC RX 637 (ALT 250 FOR IP): Performed by: INTERNAL MEDICINE

## 2025-05-13 PROCEDURE — 1200000000 HC SEMI PRIVATE

## 2025-05-13 PROCEDURE — 80053 COMPREHEN METABOLIC PANEL: CPT

## 2025-05-13 PROCEDURE — 85025 COMPLETE CBC W/AUTO DIFF WBC: CPT

## 2025-05-13 PROCEDURE — 6360000002 HC RX W HCPCS: Performed by: INTERNAL MEDICINE

## 2025-05-13 PROCEDURE — 36415 COLL VENOUS BLD VENIPUNCTURE: CPT

## 2025-05-13 PROCEDURE — G0378 HOSPITAL OBSERVATION PER HR: HCPCS

## 2025-05-13 RX ORDER — OXYCODONE AND ACETAMINOPHEN 10; 325 MG/1; MG/1
1 TABLET ORAL EVERY 6 HOURS PRN
Qty: 6 TABLET | Refills: 0 | Status: SHIPPED | OUTPATIENT
Start: 2025-05-13 | End: 2025-05-16

## 2025-05-13 RX ORDER — METOPROLOL TARTRATE 25 MG/1
25 TABLET, FILM COATED ORAL DAILY
Qty: 90 TABLET | Refills: 1 | Status: SHIPPED | OUTPATIENT
Start: 2025-05-13

## 2025-05-13 RX ORDER — LIDOCAINE 4 G/G
1 PATCH TOPICAL DAILY
Qty: 15 EACH | Refills: 0
Start: 2025-05-14

## 2025-05-13 RX ORDER — FUROSEMIDE 20 MG/1
20 TABLET ORAL EVERY OTHER DAY
Qty: 60 TABLET | Refills: 3
Start: 2025-05-13

## 2025-05-13 RX ORDER — PREDNISONE 20 MG/1
40 TABLET ORAL DAILY
Qty: 4 TABLET | Refills: 0 | Status: SHIPPED | OUTPATIENT
Start: 2025-05-14 | End: 2025-05-16

## 2025-05-13 RX ADMIN — PREDNISONE 40 MG: 20 TABLET ORAL at 10:12

## 2025-05-13 RX ADMIN — DOCUSATE SODIUM 100 MG: 100 CAPSULE, LIQUID FILLED ORAL at 10:17

## 2025-05-13 RX ADMIN — CITALOPRAM HYDROBROMIDE 20 MG: 20 TABLET ORAL at 10:12

## 2025-05-13 RX ADMIN — METHOCARBAMOL TABLETS 1000 MG: 500 TABLET, COATED ORAL at 14:47

## 2025-05-13 RX ADMIN — OXYCODONE HYDROCHLORIDE AND ACETAMINOPHEN 1 TABLET: 10; 325 TABLET ORAL at 11:31

## 2025-05-13 RX ADMIN — GABAPENTIN 800 MG: 400 CAPSULE ORAL at 14:47

## 2025-05-13 RX ADMIN — MEMANTINE HYDROCHLORIDE 10 MG: 5 TABLET ORAL at 10:17

## 2025-05-13 RX ADMIN — LEVOTHYROXINE SODIUM 125 MCG: 0.12 TABLET ORAL at 06:03

## 2025-05-13 RX ADMIN — LOSARTAN POTASSIUM 50 MG: 50 TABLET, FILM COATED ORAL at 10:12

## 2025-05-13 RX ADMIN — METHOCARBAMOL TABLETS 1000 MG: 500 TABLET, COATED ORAL at 04:05

## 2025-05-13 RX ADMIN — NIFEDIPINE 30 MG: 30 TABLET, FILM COATED, EXTENDED RELEASE ORAL at 10:17

## 2025-05-13 RX ADMIN — POLYETHYLENE GLYCOL (3350) 17 G: 17 POWDER, FOR SOLUTION ORAL at 10:27

## 2025-05-13 RX ADMIN — ATORVASTATIN CALCIUM 10 MG: 10 TABLET, FILM COATED ORAL at 10:12

## 2025-05-13 RX ADMIN — GABAPENTIN 800 MG: 400 CAPSULE ORAL at 10:12

## 2025-05-13 RX ADMIN — ASPIRIN 81 MG: 81 TABLET, COATED ORAL at 10:12

## 2025-05-13 RX ADMIN — PANTOPRAZOLE SODIUM 40 MG: 40 TABLET, DELAYED RELEASE ORAL at 06:03

## 2025-05-13 RX ADMIN — ENOXAPARIN SODIUM 40 MG: 100 INJECTION SUBCUTANEOUS at 10:17

## 2025-05-13 RX ADMIN — METHOCARBAMOL TABLETS 1000 MG: 500 TABLET, COATED ORAL at 10:17

## 2025-05-13 RX ADMIN — CHOLECALCIFEROL TAB 10 MCG (400 UNIT) 400 UNITS: 10 TAB at 10:12

## 2025-05-13 RX ADMIN — CILOSTAZOL 100 MG: 100 TABLET ORAL at 10:32

## 2025-05-13 RX ADMIN — Medication 10 ML: at 10:18

## 2025-05-13 ASSESSMENT — PAIN DESCRIPTION - ORIENTATION: ORIENTATION: LEFT

## 2025-05-13 ASSESSMENT — PAIN DESCRIPTION - DESCRIPTORS: DESCRIPTORS: ACHING

## 2025-05-13 ASSESSMENT — PAIN SCALES - GENERAL
PAINLEVEL_OUTOF10: 6
PAINLEVEL_OUTOF10: 0
PAINLEVEL_OUTOF10: 0

## 2025-05-13 ASSESSMENT — PAIN DESCRIPTION - LOCATION: LOCATION: HIP;LEG

## 2025-05-13 NOTE — PLAN OF CARE
Problem: Safety - Adult  Goal: Free from fall injury  Outcome: Progressing   Bed alarm on. Call light in reach.

## 2025-05-13 NOTE — PLAN OF CARE
Problem: Chronic Conditions and Co-morbidities  Goal: Patient's chronic conditions and co-morbidity symptoms are monitored and maintained or improved  5/12/2025 2353 by Ashley Zayas RN  Outcome: Progressing  Flowsheets (Taken 5/12/2025 2015)  Care Plan - Patient's Chronic Conditions and Co-Morbidity Symptoms are Monitored and Maintained or Improved: Monitor and assess patient's chronic conditions and comorbid symptoms for stability, deterioration, or improvement  5/12/2025 1148 by Selena Galeas RN  Outcome: Progressing     Problem: Discharge Planning  Goal: Discharge to home or other facility with appropriate resources  5/12/2025 2353 by Ashley Zayas RN  Outcome: Progressing  Flowsheets (Taken 5/12/2025 2015)  Discharge to home or other facility with appropriate resources: Identify barriers to discharge with patient and caregiver  5/12/2025 1148 by Selena Galeas RN  Outcome: Progressing     Problem: Pain  Goal: Verbalizes/displays adequate comfort level or baseline comfort level  5/12/2025 2353 by Ashley Zayas RN  Outcome: Progressing  Flowsheets (Taken 5/12/2025 2015)  Verbalizes/displays adequate comfort level or baseline comfort level: Encourage patient to monitor pain and request assistance  5/12/2025 1148 by Selena Galeas RN  Outcome: Progressing  Flowsheets (Taken 5/12/2025 1017)  Verbalizes/displays adequate comfort level or baseline comfort level:   Encourage patient to monitor pain and request assistance   Assess pain using appropriate pain scale   Administer analgesics based on type and severity of pain and evaluate response   Implement non-pharmacological measures as appropriate and evaluate response     Problem: Safety - Adult  Goal: Free from fall injury  5/12/2025 2353 by Ashley Zayas, RN  Outcome: Progressing  5/12/2025 1148 by Selena Galeas RN  Outcome: Progressing     Problem: Skin/Tissue Integrity  Goal: Skin integrity remains

## 2025-05-13 NOTE — DISCHARGE SUMMARY
Name:  Courtney Taylor  Room:  0208/0208-01  MRN:    [id number]    IM Discharge Summary    Discharging Physician:  Candido morris MD    Admit: 5/10/2025    Discharge:      PCP      Noa Cardoza PA    Diagnoses and hospital course  this Admission         Left sciatic pain:  Acute on chronic Back pain-intractable:  Lumbar degenerative disease, severe lumbar stenosis with hx of multiple SURYA and has non functional pain pump  Gabapentin ordered  Neurosurgery consulted in ER but thought to be poor surgical candidate   Pain control-scheduled Robaxin, topical lidocaine, prn  Percocet and Dilaudid given, add prednisone for 5 days pain improving   PT OT consulted and recommends SNF  Can consider SURYA as outpt        DM-2  -on Jardiance  Add sliding scale insulin     Constipation  Add Colace and MiraLAX     Hypothyroidism:   On levothyroxine      Hyperlipidemia:   Continue statin      GERD:   On PPI      Essential Hypertension:   Blood pressure stable , reviewed patient's medications and plan is to continue home medication     Class II obesity: Complicating assessment and treatment. Placing patient at risk for multiple co-morbidities as well as early death and contributing to the patient's presentation. Education, and counseling            HPI   85 y.o. female who presented to [facility] with past medical history of type 2 diabetes, fall, fibromyalgia, hypertension, osteoarthritis degenerative disc disease (s/p surgery presented to the ED with chief complaint of back pain     Patient reported that she had surgery back surgery back 2 years ago since then has been having some perineal numbness incontinence bladder and stool.  Patient reports that recently she came today is because she has family visiting her and then has been having severe left buttock pain that radiates all the way into the toe severe pain denied having any worsening back pain reported that she has chronic pain.  No fever chills nausea vomiting abdominal

## 2025-05-13 NOTE — CARE COORDINATION
1:35 PM  ARSLAN received message [facility]     ARSLAN sent [employer] to MD.

## 2025-05-13 NOTE — DISCHARGE INSTR - COC
Continuity of Care Form    Patient Name: Courtney Taylor   :  1940  MRN:  [id number]    Admit date:  5/10/2025  Discharge date:  2024    Code Status Order: Full Code   Advance Directives:     Admitting Physician:  Adonay Hylton DO  PCP: Noa Cardoza PA    Discharging Nurse: Michelle Fernandes RN  Discharging Hospital Unit/Room#: 0208/0208-01  Discharging Unit Phone Number: ***    Emergency Contact:   Extended Emergency Contact Information  Primary Emergency Contact: Thalia Hoffman  Home Phone: [phone]  Mobile Phone: [phone]  Relation: Child  Secondary Emergency Contact: Catrachita Westfall  Home Phone: [phone]  Mobile Phone: [phone]  Relation: Other    Past Surgical History:  Past Surgical History:   Procedure Laterality Date    ABDOMEN SURGERY      Lap Band    ANKLE SURGERY  2012    Incision, irrigation and debridement right ankle    ANKLE SURGERY  12    incision and drainage of right ankle    ANKLE SURGERY  12    incision and drainage right foot.    ANKLE SURGERY Right 10/10/2013    INCISION AND DEBRIDEMENT WITH REMOVAL OF PROSTHESIS RIGHT ANKLE WITH INSERTION OF ANTIBIOTIC SPACER    ANKLE SURGERY Right 13    RIGHT ANKLE REMOVAL OF ANTIBIOTIC BEADS, TIBIOTALOCALCANEAL FUSION , PLATELET RICH PLASMA INJECTION AND FEMORAL HEAD ALLOGRAFT WITH MINI C ARM BIOMET    BACK SURGERY      Cervical sx    COLONOSCOPY      DEBRIDEMENT  12    incision and debridement right ankle with application of Acell graft and block for pain control    ENDOSCOPY, COLON, DIAGNOSTIC      JOINT REPLACEMENT      Bilat knees    OTHER SURGICAL HISTORY  2012    INCISION AND DEBRIDEMENT RIGHT ANKLE WITH APPLICATION OF WOUND VAC    OTHER SURGICAL HISTORY  05/10/2012    i & d right ankle    OTHER SURGICAL HISTORY  12    incsion and debridement right ankle with placement of wound vac    OTHER SURGICAL HISTORY  2012    REPEAT DEBRIDEMENT WITH IRRIGATION AND DRAINAGE RIGHT ANKLE

## 2025-05-13 NOTE — CARE COORDINATION
Case Management Assessment            Discharge Note                    Date / Time of Note: 5/13/2025 2:49 PM                  Discharge Note Completed by: ANÍBAL Reno    Patient Name: Courtney Taylor   YOB: 1940  Diagnosis: Intractable low back pain [M54.59]  Sciatic pain, left [M54.32]   Date / Time: 5/10/2025  4:16 PM    Current PCP: Noa Cardoza PA  Clinic patient: No    Hospitalization in the last 30 days: No       Advance Directives:  Code Status: Full Code  [place] DNR form completed and on chart: No    Financial:  Payor: [employer] MEDICARE / Plan: [employer] MEDICARE-ADVANTAGE PPO / Product Type: Medicare /      Pharmacy:    [facility] [id number] [facility] [address] - P [phone] - F [phone]  [address]  [facility] [place] [facility] [address]  Phone: [phone] Fax: [phone]      Assistance purchasing medications?: Potential Assistance Purchasing Medications: No  Assistance provided by Case Management: None at this time    Does patient want to participate in local refill/ meds to beds program?:      Meds To Beds General Rules:  1. Can ONLY be done Monday- Friday between 8:30am-5pm  2. Prescription(s) must be in pharmacy by 3pm to be filled same day  3.Copy of patient's insurance/ prescription drug card and patient face sheet must be sent along with the prescription(s)  4. Cost of Rx cannot be added to hospital bill. If financial assistance is needed, please contact unit  or ;  or  CANNOT provide pharmacy voucher for patients co-pays  5. Patients can then  the prescription on their way out of the hospital at discharge, or pharmacy can deliver to the bedside if staff is available. (payment due at time of pick-up or delivery - cash, check, or card accepted)     Able to afford home medications/ co-pay costs: Yes    ADLS:  Current PT AM-PAC Score:   /24  Current OT AM-PAC Score: 18

## 2025-05-13 NOTE — FLOWSHEET NOTE
05/12/25 2015   Vital Signs   Temp 98.1 °F (36.7 °C)   Temp Source Oral   Pulse 87   Heart Rate Source Monitor   Respirations 18   BP (!) 182/71   MAP (Calculated) 108   BP Location Right lower arm   BP Method Automatic   Patient Position Semi darynwlers   Pain Assessment   Pain Assessment None - Denies Pain   Care Plan - Pain Goals   Verbalizes/displays adequate comfort level or baseline comfort level Encourage patient to monitor pain and request assistance   Opioid-Induced Sedation   POSS Score S   RASS   [place] Agitation Sedation Scale (RASS) -1   Oxygen Therapy   SpO2 95 %   Pulse Oximetry Type Intermittent   Pulse Oximeter Device Mode Intermittent   Pulse Oximeter Device Location Left;Finger   O2 Device None (Room air)   O2 Flow Rate (L/min) 0 L/min   Oxygen Therapy None (Room air)     Pt resting comfortably in bed. Bed alarm on, call light within reach. No needs expressed at this time. Will continue to monitor.

## 2025-05-13 NOTE — FLOWSHEET NOTE
Pt a/o. Am assessment completed see flow sheet. Pt denies any pain/ needs at this time. Call light within reach.     05/13/25 0830   Vital Signs   Temp 97.7 °F (36.5 °C)   Temp Source Oral   Pulse 73   Heart Rate Source Monitor   Respirations 18   BP (!) 158/64   MAP (Calculated) 95   BP Location Left upper arm   BP Method Automatic   Patient Position Semi fowlers   Pain Assessment   Pain Assessment None - Denies Pain   Pain Level 0   Opioid-Induced Sedation   POSS Score 1   RASS   [place] Agitation Sedation Scale (RASS) 0   Oxygen Therapy   SpO2 94 %   O2 Device None (Room air)

## 2025-05-13 NOTE — PROGRESS NOTES
4 Eyes Skin Assessment     NAME:  Courtney Taylor  YOB: 1940  MEDICAL RECORD NUMBER:  [id number]    The patient is being assessed for  Other Discharge to SNF    I agree that at least one RN has performed a thorough Head to Toe Skin Assessment on the patient. ALL assessment sites listed below have been assessed.      Areas assessed by both nurses:    Head, Face, Ears, Shoulders, Back, Chest, Arms, Elbows, Hands, Sacrum. Buttock, Coccyx, Ischium, and Legs. Feet and Heels    Redness to abdomen folds, inner dry in place  Pain pump left hip, not active.          Does the Patient have a Wound? No noted wound(s)       Carlos Alberto Prevention initiated by RN: Yes  Wound Care Orders initiated by RN: No    Pressure Injury (Stage 3,4, Unstageable, DTI, NWPT, and Complex wounds) if present, place Wound referral order by RN under : No    New Ostomies, if present place, Ostomy referral order under : No     Nurse 1 eSignature: Electronically signed by Michelle Fernandes RN on 5/13/25 at 6:30 PM EDT    **SHARE this note so that the co-signing nurse can place an eSignature**    Nurse 2 eSignature: Electronically signed by Manisha Snow RN on 5/13/25 at 6:41 PM EDT   
4 Eyes Skin Assessment     NAME:  Courtney Taylor  YOB: 1940  MEDICAL RECORD NUMBER:  [id number]    The patient is being assessed for  Shift Transfer    I agree that at least one RN has performed a thorough Head to Toe Skin Assessment on the patient. ALL assessment sites listed below have been assessed.      Areas assessed by both nurses:    -redness to bilateral breast folds and abdomen folds (interdry in place)  -scattered bruising to BUE    Head, Face, Ears, Shoulders, Back, Chest, Arms, Elbows, Hands, Sacrum. Buttock, Coccyx, Ischium, Legs. Feet and Heels, and Under Medical Devices   Scattered bruising. Moisture and redness under breasts and abd pannus.         Does the Patient have a Wound? No noted wound(s)       Carlos Alberto Prevention initiated by RN: Yes  Wound Care Orders initiated by RN: No    Pressure Injury (Stage 3,4, Unstageable, DTI, NWPT, and Complex wounds) if present, place Wound referral order by RN under : No    New Ostomies, if present place, Ostomy referral order under : No     Nurse 1 eSignature: Electronically signed by Atif Acevedo RN on 5/11/25 at 8:10 AM EDT    **SHARE this note so that the co-signing nurse can place an eSignature**    Nurse 2 eSignature: Electronically signed by Selena Galeas RN on 5/11/25 at 8:14 AM EDT   
Bedside Mobility Assessment Tool (BMAT):     Assessment Level 1- Sit and Shake    1. From a semi-reclined position, ask patient to sit up and rotate to a seated position at the side of the bed. Can use the bedrail.    2. Ask patient to reach out and grab your hand and shake making sure patient reaches across his/her midline.   Pass- Patient is able to come to a seated position, maintain core strength. Maintains seated balance while reaching across midline. Move on to Assessment Level 2.     Assessment Level 2- Stretch and Point   1. With patient in seated position at the side of the bed, have patient place both feet on the floor (or stool) with knees no higher than hips.    2. Ask patient to stretch one leg and straighten the knee, then bend the ankle/flex and point the toes. If appropriate, repeat with the other leg.   Fail- Patient is unable to complete task. Patient is MOBILITY LEVEL 2.     Assessment Level 3- Stand   1. Ask patient to elevate off the bed or chair (seated to standing) using an assistive device (cane, bedrail).    2. Patient should be able to raise buttocks off be and hold for a count of five. May repeat once.   Fail- Patient unable to demonstrate standing stability. Patient is MOBILITY LEVEL 3.     Assessment Level 4- Walk   1. Ask patient to march in place at bedside.    2. Then ask patient to advance step and return each foot. Some medical conditions may render a patient from stepping backwards, use your best clinical judgement.   Fail- Patient not able to complete tasks OR requires use of assistive device. Patient is MOBILITY LEVEL 3.       Mobility Level- 3   
[facility] will fax list to provided number.    1209: Have not received fax. Called pharmacy to send fax. Pharm voiced they sent it, but will resend current medication list.  
IM Progress Note    Admit Date:  5/10/2025    Subjective:  Ms. Taylor complains of persistent back pain, pain radiating her left leg.      Objective:   BP (!) 150/67   Pulse 60   Temp 97.4 °F (36.3 °C) (Oral)   Resp 18   Ht 1.499 m (4' 11\")   Wt 97.6 kg (215 lb 1.6 oz)   SpO2 93%   BMI 43.44 kg/m²     Intake/Output Summary (Last 24 hours) at 5/11/2025 1610  Last data filed at 5/11/2025 1049  Gross per 24 hour   Intake 250 ml   Output --   Net 250 ml         Physical Exam:  General:  Awake, alert, in moderate distress secondary to pain  Skin:  Warm and dry  Neck:  JVD absent. Neck supple  Chest:  Clear to auscultation, respiration easy. No wheezes, rales or rhonchi.   Cardiovascular:  RRR ,S1S2 normal  Abdomen:  Soft, non tender, non distended, BS +  Extremities:  No edema.  Lower back and left leg tender  Intact peripheral pulses. Brisk cap refill, < 2 secs  Neuro: non focal      Medications:   Scheduled Meds:   sodium chloride flush  10 mL IntraVENous 2 times per day    enoxaparin  40 mg SubCUTAneous Daily    gabapentin  800 mg Oral TID    aspirin  81 mg Oral Daily    atorvastatin  10 mg Oral Daily    cilostazol  100 mg Oral BID    citalopram  20 mg Oral Daily    donepezil  10 mg Oral Nightly    levothyroxine  125 mcg Oral Daily    memantine  10 mg Oral Daily    losartan  50 mg Oral Daily    NIFEdipine  30 mg Oral Daily    pantoprazole  40 mg Oral QAM AC       Continuous Infusions:   sodium chloride         Data:  CBC:   Recent Labs     05/10/25  1652 05/11/25  0659   WBC 5.0 4.9   RBC 3.85* 3.64*   HGB 11.9* 11.2*   HCT 36.2 34.2*   MCV 94.0 93.8   RDW 14.7 14.4    202     BMP:   Recent Labs     05/10/25  1652 05/11/25  0659    138   K 4.0 4.0    105   CO2 28 23   BUN 17 19   CREATININE 0.7 0.6     BNP: No results for input(s): \"BNP\" in the last 72 hours.  PT/INR: No results for input(s): \"PROTIME\", \"INR\" in the last 72 hours.  APTT: No results for input(s): \"APTT\" in the last 72 
IM Progress Note    Admit Date:  5/10/2025    Subjective:  Ms. Taylor complains of persistent back pain, pain radiating her left leg.    She has had prior back issues. She says SURYA does not work.      Objective:   BP (!) 152/71   Pulse 83   Temp 98.1 °F (36.7 °C) (Oral)   Resp 18   Ht 1.499 m (4' 11\")   Wt 97.6 kg (215 lb 1.6 oz)   SpO2 93%   BMI 43.44 kg/m²     Intake/Output Summary (Last 24 hours) at 5/12/2025 1156  Last data filed at 5/12/2025 1022  Gross per 24 hour   Intake 850 ml   Output 989 ml   Net -139 ml         Physical Exam:  General:  Awake, alert, in moderate distress secondary to pain  Skin:  Warm and dry  Neck:  JVD absent. Neck supple  Chest:  Clear to auscultation, respiration easy. No wheezes, rales or rhonchi.   Cardiovascular:  RRR ,S1S2 normal  Abdomen:  Soft, non tender, non distended, BS +  Extremities:  No edema.  Lower back and left leg tender  Intact peripheral pulses. Brisk cap refill, < 2 secs  Neuro: non focal      Medications:   Scheduled Meds:   docusate sodium  100 mg Oral BID    cholecalciferol  400 Units Oral Daily    insulin lispro  0-4 Units SubCUTAneous 4x Daily AC & HS    lidocaine  1 patch TransDERmal Daily    methocarbamol  1,000 mg Oral Q6H    polyethylene glycol  17 g Oral BID    insulin lispro  0-4 Units SubCUTAneous Once    predniSONE  40 mg Oral Daily    sodium chloride flush  10 mL IntraVENous 2 times per day    enoxaparin  40 mg SubCUTAneous Daily    gabapentin  800 mg Oral TID    aspirin  81 mg Oral Daily    atorvastatin  10 mg Oral Daily    cilostazol  100 mg Oral BID    citalopram  20 mg Oral Daily    donepezil  10 mg Oral Nightly    levothyroxine  125 mcg Oral Daily    memantine  10 mg Oral Daily    losartan  50 mg Oral Daily    NIFEdipine  30 mg Oral Daily    pantoprazole  40 mg Oral QAM AC       Continuous Infusions:   dextrose      sodium chloride         Data:  CBC:   Recent Labs     05/10/25  1652 05/11/25  0659 05/12/25  0523   WBC 5.0 4.9 2.7*   RBC 
Inpatient Physical Therapy Evaluation & Treatment    Unit: [facility]  Date:  5/11/2025  Patient Name:    Courtney Taylor  Admitting diagnosis:  Intractable low back pain [M54.59]  Sciatic pain, left [M54.32]  Admit Date:  5/10/2025  Precautions/Restrictions/WB Status/ Lines/ Wounds/ Oxygen: Fall risk, Bed/chair alarm, Lines (IV and external catheter), and Isolation Precautions: Contact      Treatment Time:  12:35-13:16  Treatment Number:  1   Timed Code Treatment Minutes: 31 minutes  Total Treatment Minutes:  41  minutes    Patient Stated Goals for Therapy: \" less pain \"          Discharge Recommendations: SNF  DME needs for discharge: Defer to facility       Therapy recommendation for EMS Transport: requires transport by cot due to pt needs A x 2 for safe transfers and pt with poor sitting balance/tolerance    Therapy recommendations for staff:   Assist of 2 for sitting EOB    History of Present Illness:   From Adonay Hylton H&P 5/10  \"85 y.o. female who presented to [facility] with past medical history of type 2 diabetes, fall, fibromyalgia, hypertension, osteoarthritis degenerative disc disease (s/p surgery presented to the ED with chief complaint of back pain     Patient reported that she had surgery back surgery back 2 years ago since then has been having some perineal numbness incontinence bladder and stool.  Patient reports that recently she came today is because she has family visiting her and then has been having severe left buttock pain that radiates all the way into the toe severe pain denied having any worsening back pain reported that she has chronic pain.  No fever chills nausea vomiting abdominal pain.  Patient does report having some chronic dysuria\"    Abdominal CT results 5/10  IMPRESSION:  1. No acute abnormality of the abdomen or pelvis.  No radiodense urolithiasis.  2. Cholelithiasis.  3. 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 Increased stool in the colon.  5. Cardiomegaly and congestion.  6. 
Patient awake in bed, moaning and whimpering in pain c/o Lt sided lower back pain PRN Percocet given. Pt refused to be repositioned at this time. No other needs noted. Bed alarm in place. Call light and bedside table within reach.  
Patient awake in bed, tearful and grimacing c/o Lt sided lower back pain 10/10 PRN Dilaudid given. Slightly repositioned pt for comfort. No other needs noted at this time. Bed alarm in place. Call light and bedside table within reach.   
Patient awake in bed. Patient with large urinary incontinent episode. Jesika area cleansed with barrier cream wipes and clean depends applied.  Bladder scan completed (749 ml).  Orders placed for Straight Cath. UA. Renal US  Straight Cath completed with (650 ml) output.  Post-void scan (89 ml).   Jesika-area cleansed and Zinc applied to labia, which continues to be swollen, red and chapped due to the pure-wick use. Pure-wick has been off since shift assessment.   Patient c/o Lt sided lower back, hip and leg (Robaxin) given.   Patient repositioned for comfort.  Call light and bedside table within reach.   
Patient c/o continued lt-sided lower back pain PRN Dilaudid given. Patient repositioned for comfort. Bed alarm in place. Call light and bedside table within reach.   
Patient discharged to [facility] via stretcher.  Patient alert and oriented.  Report given to Dafne PITTS at [facility].  
Patient in bed, sleeping/ snoring, resting comfortably. No distress noted at this time. Scheduled Robaxin held. Bed alarm in place. Call light and bedside table within reach.  
RT Inhaler-Nebulizer Bronchodilator Protocol Note    There is a bronchodilator order in the chart from a provider indicating to follow the RT Bronchodilator Protocol and there is an “Initiate RT Inhaler-Nebulizer Bronchodilator Protocol” order as well (see protocol at bottom of note).    CXR Findings:  No results found.    The findings from the last RT Protocol Assessment were as follows:   History Pulmonary Disease: (P) None or smoker <15 pack years  Respiratory Pattern: (P) Regular pattern and RR 12-20 bpm  Breath Sounds: (P) Clear breath sounds  Cough: (P) Strong, spontaneous, non-productive  Indication for Bronchodilator Therapy:    Bronchodilator Assessment Score: (P) 0    Aerosolized bronchodilator medication orders have been revised according to the RT Inhaler-Nebulizer Bronchodilator Protocol below.    Respiratory Therapist to perform RT Therapy Protocol Assessment initially then follow the protocol.  Repeat RT Therapy Protocol Assessment PRN for score 0-3 or on second treatment, BID, and PRN for scores above 3.    No Indications - adjust the frequency to every 6 hours PRN wheezing or bronchospasm, if no treatments needed after 48 hours then discontinue using Per Protocol order mode.     If indication present, adjust the RT bronchodilator orders based on the Bronchodilator Assessment Score as indicated below.  Use Inhaler orders unless patient has one or more of the following: on home nebulizer, not able to hold breath for 10 seconds, is not alert and oriented, cannot activate and use MDI correctly, or respiratory rate 25 breaths per minute or more, then use the equivalent nebulizer order(s) with same Frequency and PRN reasons based on the score.  If a patient is on this medication at home then do not decrease Frequency below that used at home.    0-3 - enter or revise RT bronchodilator order(s) to equivalent RT Bronchodilator order with Frequency of every 4 hours PRN for wheezing or increased work of 
patient discharges from this facility prior to next visit, this note will serve as the Discharge Summary

## 2025-05-13 NOTE — FLOWSHEET NOTE
05/13/25 0321   Vital Signs   Temp 98.4 °F (36.9 °C)   Temp Source Oral   Pulse 82   Heart Rate Source Monitor   Respirations 18   /79   MAP (Calculated) 97   BP Location Right lower arm   BP Method Automatic   Patient Position Semi fowlers   Oxygen Therapy   SpO2 93 %   O2 Device None (Room air)   O2 Flow Rate (L/min) 0 L/min

## 2025-05-14 ENCOUNTER — TELEPHONE (OUTPATIENT)
Dept: FAMILY MEDICINE CLINIC | Age: 85
End: 2025-05-14

## 2025-05-14 NOTE — TELEPHONE ENCOUNTER
Care Transitions Initial Follow Up Call    Outreach made within 2 business days of discharge: Yes    Patient: Courtney Taylor Patient : 1940   MRN: [id number]  Reason for Admission: Sciatica back pain  Discharge Date: 25       Spoke with: Patient admitted to [facility].  Once the patient is discharged I will schedule for a Hospital follow up appointment.    Discharge department/facility: [facility]        Scheduled appointment with PCP within 7-14 days    Follow Up  Future Appointments   Date Time Provider Department Center   2025  1:00 PM Matti Abarca MD [facility]   2025  1:00 PM Matti Abarca MD [facility]   6/3/2025 10:30 AM [facility]       Keya Aparicio LPN

## 2025-05-15 ENCOUNTER — TELEPHONE (OUTPATIENT)
Dept: ENDOCRINOLOGY | Age: 85
End: 2025-05-15

## 2025-05-15 NOTE — ED PROVIDER NOTES
[facility] MEDICAL-SURGICAL  EMERGENCY DEPARTMENT ENCOUNTER        Pt Name: Courtney Taylor  MRN: [id number]  Birthdate 1940  Date of evaluation: 5/10/2025  Provider: NESSA Reddy - CNP  PCP: Noa Cardoza PA  Note Started: 4:02 PM EDT 5/15/25      FELECIA. I have evaluated this patient.        CHIEF COMPLAINT       Chief Complaint   Patient presents with    Back Pain     Pt in by EMS from [facility]. Pt has hx of chronic back pain. Pt has had worse lower back pain the last 3 days and can't get out of bed. Pt has some burning with urination.        HISTORY OF PRESENT ILLNESS: 1 or more Elements     History From: Patient     Limitations to history : None    Social Determinants Significantly Affecting Health : None    Chief Complaint: Back pain     Courtney Taylor is a 85 y.o. female who presents to the emergency department today with symptoms of back pain.  Patient reported symptoms of lower back pain that has been ongoing for months and chronically for years.  Over the last 3 days her pain is worsened.  She had a recent evaluation with neurosurgery as well as MRI of her lumbar spine, hip, and thoracic spine.  States that she continues to have discomfort in her back.  She states that her legs are limited in strength due to pain.  She denies any weakness.  No numbness.  No recent falls or trauma.    Nursing Notes were all reviewed and agreed with or any disagreements were addressed in the HPI.    REVIEW OF SYSTEMS :      Review of Systems    Positives and Pertinent negatives as per HPI.     SURGICAL HISTORY     Past Surgical History:   Procedure Laterality Date    ABDOMEN SURGERY  2004    Lap Band    ANKLE SURGERY  5-2-2012    Incision, irrigation and debridement right ankle    ANKLE SURGERY  5/4/12    incision and drainage of right ankle    ANKLE SURGERY  5/7/12    incision and drainage right foot.    ANKLE SURGERY Right 10/10/2013    INCISION AND DEBRIDEMENT WITH REMOVAL OF PROSTHESIS RIGHT

## 2025-05-15 NOTE — TELEPHONE ENCOUNTER
New pt scheduled 05/22/2025. Last DXA was 05/08/2024.     This patient has a Medicare Advantage plan. Standard Medicare covers bone density testing yearly, as do most other plans. Some commercial plans and Advantage plans limit testing to every other year. Please ask her to check with her plan to find out if they cover DXA testing yearly or not. If covered, please schedule DXA before her appointment with me.     Thanks.

## 2025-05-21 ENCOUNTER — APPOINTMENT (OUTPATIENT)
Dept: CT IMAGING | Age: 85
End: 2025-05-21
Payer: MEDICARE

## 2025-05-21 ENCOUNTER — APPOINTMENT (OUTPATIENT)
Dept: GENERAL RADIOLOGY | Age: 85
End: 2025-05-21
Payer: MEDICARE

## 2025-05-21 ENCOUNTER — HOSPITAL ENCOUNTER (INPATIENT)
Age: 85
LOS: 8 days | Discharge: NURSING, REHAB OR LONG-TERM CARE | End: 2025-05-30
Attending: EMERGENCY MEDICINE | Admitting: HOSPITALIST
Payer: MEDICARE

## 2025-05-21 DIAGNOSIS — E86.0 DEHYDRATION: ICD-10-CM

## 2025-05-21 DIAGNOSIS — R47.1 DYSARTHRIA: ICD-10-CM

## 2025-05-21 DIAGNOSIS — N17.9 AKI (ACUTE KIDNEY INJURY): Primary | ICD-10-CM

## 2025-05-21 DIAGNOSIS — R51.9 NONINTRACTABLE HEADACHE, UNSPECIFIED CHRONICITY PATTERN, UNSPECIFIED HEADACHE TYPE: ICD-10-CM

## 2025-05-21 DIAGNOSIS — E87.1 ACUTE HYPONATREMIA: ICD-10-CM

## 2025-05-21 DIAGNOSIS — G89.4 CHRONIC PAIN DISORDER: Chronic | ICD-10-CM

## 2025-05-21 DIAGNOSIS — G89.29 OTHER CHRONIC PAIN: ICD-10-CM

## 2025-05-21 LAB
ALBUMIN SERPL-MCNC: 3.6 G/DL (ref 3.4–5)
ALBUMIN/GLOB SERPL: 1.5 {RATIO} (ref 1.1–2.2)
ALP SERPL-CCNC: 88 U/L (ref 40–129)
ALT SERPL-CCNC: 15 U/L (ref 10–40)
ANION GAP SERPL CALCULATED.3IONS-SCNC: 12 MMOL/L (ref 3–16)
AST SERPL-CCNC: 34 U/L (ref 15–37)
BASOPHILS # BLD: 0 K/UL (ref 0–0.2)
BASOPHILS NFR BLD: 0.5 %
BILIRUB SERPL-MCNC: 0.3 MG/DL (ref 0–1)
BUN SERPL-MCNC: 49 MG/DL (ref 7–20)
CALCIUM SERPL-MCNC: 8.5 MG/DL (ref 8.3–10.6)
CHLORIDE SERPL-SCNC: 92 MMOL/L (ref 99–110)
CO2 SERPL-SCNC: 24 MMOL/L (ref 21–32)
CREAT SERPL-MCNC: 3.5 MG/DL (ref 0.6–1.2)
DEPRECATED RDW RBC AUTO: 14 % (ref 12.4–15.4)
EOSINOPHIL # BLD: 0.3 K/UL (ref 0–0.6)
EOSINOPHIL NFR BLD: 3.3 %
FLUAV RNA RESP QL NAA+PROBE: NOT DETECTED
FLUBV RNA RESP QL NAA+PROBE: NOT DETECTED
GFR SERPLBLD CREATININE-BSD FMLA CKD-EPI: 12 ML/MIN/{1.73_M2}
GLUCOSE SERPL-MCNC: 153 MG/DL (ref 70–99)
HCT VFR BLD AUTO: 32.4 % (ref 36–48)
HGB BLD-MCNC: 10.7 G/DL (ref 12–16)
LYMPHOCYTES # BLD: 1.3 K/UL (ref 1–5.1)
LYMPHOCYTES NFR BLD: 14.3 %
MCH RBC QN AUTO: 30.5 PG (ref 26–34)
MCHC RBC AUTO-ENTMCNC: 33.1 G/DL (ref 31–36)
MCV RBC AUTO: 92 FL (ref 80–100)
MONOCYTES # BLD: 1 K/UL (ref 0–1.3)
MONOCYTES NFR BLD: 11.5 %
NEUTROPHILS # BLD: 6.2 K/UL (ref 1.7–7.7)
NEUTROPHILS NFR BLD: 70.4 %
PLATELET # BLD AUTO: 328 K/UL (ref 135–450)
PMV BLD AUTO: 7.4 FL (ref 5–10.5)
POTASSIUM SERPL-SCNC: 3.7 MMOL/L (ref 3.5–5.1)
PROT SERPL-MCNC: 6 G/DL (ref 6.4–8.2)
RBC # BLD AUTO: 3.52 M/UL (ref 4–5.2)
SARS-COV-2 RNA RESP QL NAA+PROBE: NOT DETECTED
SODIUM SERPL-SCNC: 128 MMOL/L (ref 136–145)
WBC # BLD AUTO: 8.8 K/UL (ref 4–11)

## 2025-05-21 PROCEDURE — 99285 EMERGENCY DEPT VISIT HI MDM: CPT

## 2025-05-21 PROCEDURE — 94761 N-INVAS EAR/PLS OXIMETRY MLT: CPT

## 2025-05-21 PROCEDURE — 2580000003 HC RX 258: Performed by: EMERGENCY MEDICINE

## 2025-05-21 PROCEDURE — 80053 COMPREHEN METABOLIC PANEL: CPT

## 2025-05-21 PROCEDURE — 71045 X-RAY EXAM CHEST 1 VIEW: CPT

## 2025-05-21 PROCEDURE — 70450 CT HEAD/BRAIN W/O DYE: CPT

## 2025-05-21 PROCEDURE — 96360 HYDRATION IV INFUSION INIT: CPT

## 2025-05-21 PROCEDURE — 85025 COMPLETE CBC W/AUTO DIFF WBC: CPT

## 2025-05-21 PROCEDURE — 96361 HYDRATE IV INFUSION ADD-ON: CPT

## 2025-05-21 PROCEDURE — 2700000000 HC OXYGEN THERAPY PER DAY

## 2025-05-21 PROCEDURE — 87636 SARSCOV2 & INF A&B AMP PRB: CPT

## 2025-05-21 RX ORDER — 0.9 % SODIUM CHLORIDE 0.9 %
500 INTRAVENOUS SOLUTION INTRAVENOUS ONCE
Status: COMPLETED | OUTPATIENT
Start: 2025-05-21 | End: 2025-05-21

## 2025-05-21 RX ORDER — 0.9 % SODIUM CHLORIDE 0.9 %
2000 INTRAVENOUS SOLUTION INTRAVENOUS ONCE
Status: COMPLETED | OUTPATIENT
Start: 2025-05-21 | End: 2025-05-21

## 2025-05-21 RX ORDER — FENTANYL CITRATE 50 UG/ML
25 INJECTION, SOLUTION INTRAMUSCULAR; INTRAVENOUS ONCE
Refills: 0 | Status: DISCONTINUED | OUTPATIENT
Start: 2025-05-22 | End: 2025-05-30 | Stop reason: HOSPADM

## 2025-05-21 RX ADMIN — SODIUM CHLORIDE 2000 ML: 0.9 INJECTION, SOLUTION INTRAVENOUS at 22:23

## 2025-05-21 RX ADMIN — SODIUM CHLORIDE 500 ML: 9 INJECTION, SOLUTION INTRAVENOUS at 21:34

## 2025-05-21 ASSESSMENT — PAIN - FUNCTIONAL ASSESSMENT: PAIN_FUNCTIONAL_ASSESSMENT: 0-10

## 2025-05-21 ASSESSMENT — PAIN SCALES - GENERAL: PAINLEVEL_OUTOF10: 8

## 2025-05-21 ASSESSMENT — PAIN DESCRIPTION - LOCATION: LOCATION: HEAD

## 2025-05-21 ASSESSMENT — PAIN DESCRIPTION - DESCRIPTORS: DESCRIPTORS: THROBBING

## 2025-05-22 ENCOUNTER — TELEPHONE (OUTPATIENT)
Dept: ENDOCRINOLOGY | Age: 85
End: 2025-05-22

## 2025-05-22 ENCOUNTER — APPOINTMENT (OUTPATIENT)
Dept: MRI IMAGING | Age: 85
End: 2025-05-22
Payer: MEDICARE

## 2025-05-22 ENCOUNTER — APPOINTMENT (OUTPATIENT)
Age: 85
End: 2025-05-22
Payer: MEDICARE

## 2025-05-22 PROBLEM — N17.9 ACUTE KIDNEY INJURY: Status: ACTIVE | Noted: 2025-05-22

## 2025-05-22 LAB
ALBUMIN SERPL-MCNC: 3.4 G/DL (ref 3.4–5)
ALBUMIN/GLOB SERPL: 1.2 {RATIO} (ref 1.1–2.2)
ALP SERPL-CCNC: 96 U/L (ref 40–129)
ALT SERPL-CCNC: 16 U/L (ref 10–40)
AMORPH SED URNS QL MICRO: ABNORMAL /HPF
ANION GAP SERPL CALCULATED.3IONS-SCNC: 15 MMOL/L (ref 3–16)
AST SERPL-CCNC: 36 U/L (ref 15–37)
BACTERIA URNS QL MICRO: ABNORMAL /HPF
BASOPHILS # BLD: 0 K/UL (ref 0–0.2)
BASOPHILS NFR BLD: 0.5 %
BILIRUB SERPL-MCNC: 0.3 MG/DL (ref 0–1)
BILIRUB UR QL STRIP.AUTO: NEGATIVE
BUN SERPL-MCNC: 42 MG/DL (ref 7–20)
CALCIUM SERPL-MCNC: 8.2 MG/DL (ref 8.3–10.6)
CHLORIDE SERPL-SCNC: 98 MMOL/L (ref 99–110)
CHOLEST SERPL-MCNC: 141 MG/DL (ref 0–199)
CLARITY UR: CLEAR
CO2 SERPL-SCNC: 24 MMOL/L (ref 21–32)
COLOR UR: YELLOW
CREAT SERPL-MCNC: 2.3 MG/DL (ref 0.6–1.2)
CREAT UR-MCNC: 95.5 MG/DL (ref 28–259)
DEPRECATED RDW RBC AUTO: 14.2 % (ref 12.4–15.4)
ECHO AO ASC DIAM: 2.6 CM
ECHO AO ASCENDING AORTA INDEX: 1.33 CM/M2
ECHO AV MEAN GRADIENT: 8 MMHG
ECHO AV MEAN VELOCITY: 1.3 M/S
ECHO AV PEAK GRADIENT: 15 MMHG
ECHO AV PEAK VELOCITY: 1.9 M/S
ECHO AV VTI: 38.9 CM
ECHO BSA: 2.08 M2
ECHO EST RA PRESSURE: 3 MMHG
ECHO IVC PROX: 1.6 CM
ECHO LV E' LATERAL VELOCITY: 10.6 CM/S
ECHO LV E' SEPTAL VELOCITY: 9.03 CM/S
ECHO LV EDV A2C: 76 ML
ECHO LV EDV A4C: 72 ML
ECHO LV EDV INDEX A4C: 37 ML/M2
ECHO LV EDV NDEX A2C: 39 ML/M2
ECHO LV EF PHYSICIAN: 71 %
ECHO LV EJECTION FRACTION A2C: 72 %
ECHO LV EJECTION FRACTION A4C: 67 %
ECHO LV EJECTION FRACTION BIPLANE: 71 % (ref 55–100)
ECHO LV ESV A2C: 21 ML
ECHO LV ESV A4C: 24 ML
ECHO LV ESV INDEX A2C: 11 ML/M2
ECHO LV ESV INDEX A4C: 12 ML/M2
ECHO LV INTERNAL DIMENSION DIASTOLE INDEX: 2.24 CM/M2
ECHO LV INTERNAL DIMENSION DIASTOLIC: 4.4 CM (ref 3.9–5.3)
ECHO LV IVSD: 1 CM (ref 0.6–0.9)
ECHO LV MASS 2D: 147.8 G (ref 67–162)
ECHO LV MASS INDEX 2D: 75.4 G/M2 (ref 43–95)
ECHO LV POSTERIOR WALL DIASTOLIC: 1 CM (ref 0.6–0.9)
ECHO LV RELATIVE WALL THICKNESS RATIO: 0.45
ECHO MV A VELOCITY: 0.93 M/S
ECHO MV E DECELERATION TIME (DT): 187 MS
ECHO MV E VELOCITY: 1.17 M/S
ECHO MV E/A RATIO: 1.26
ECHO MV E/E' LATERAL: 11.04
ECHO MV E/E' RATIO (AVERAGED): 12
ECHO MV E/E' SEPTAL: 12.96
ECHO RIGHT VENTRICULAR SYSTOLIC PRESSURE (RVSP): 44 MMHG
ECHO RV BASAL DIMENSION: 2.8 CM
ECHO RV FREE WALL PEAK S': 25.9 CM/S
ECHO RV LONGITUDINAL DIMENSION: 6.1 CM
ECHO RV MID DIMENSION: 2.4 CM
ECHO RV TAPSE: 1.9 CM (ref 1.7–?)
ECHO TV REGURGITANT MAX VELOCITY: 3.22 M/S
ECHO TV REGURGITANT PEAK GRADIENT: 41 MMHG
EKG ATRIAL RATE: 70 BPM
EKG DIAGNOSIS: NORMAL
EKG P AXIS: 60 DEGREES
EKG P-R INTERVAL: 192 MS
EKG Q-T INTERVAL: 420 MS
EKG QRS DURATION: 116 MS
EKG QTC CALCULATION (BAZETT): 453 MS
EKG R AXIS: -39 DEGREES
EKG T AXIS: 79 DEGREES
EKG VENTRICULAR RATE: 70 BPM
EOSINOPHIL # BLD: 0.3 K/UL (ref 0–0.6)
EOSINOPHIL NFR BLD: 3.5 %
EPI CELLS #/AREA URNS HPF: ABNORMAL /HPF (ref 0–5)
GFR SERPLBLD CREATININE-BSD FMLA CKD-EPI: 20 ML/MIN/{1.73_M2}
GLUCOSE BLD-MCNC: 112 MG/DL (ref 70–99)
GLUCOSE BLD-MCNC: 128 MG/DL (ref 70–99)
GLUCOSE BLD-MCNC: 141 MG/DL (ref 70–99)
GLUCOSE BLD-MCNC: 144 MG/DL (ref 70–99)
GLUCOSE BLD-MCNC: 153 MG/DL (ref 70–99)
GLUCOSE SERPL-MCNC: 117 MG/DL (ref 70–99)
GLUCOSE UR STRIP.AUTO-MCNC: 500 MG/DL
HCT VFR BLD AUTO: 33.7 % (ref 36–48)
HDLC SERPL-MCNC: 67 MG/DL (ref 40–60)
HGB BLD-MCNC: 11.3 G/DL (ref 12–16)
HGB UR QL STRIP.AUTO: ABNORMAL
KETONES UR STRIP.AUTO-MCNC: NEGATIVE MG/DL
LDLC SERPL CALC-MCNC: 57 MG/DL
LEUKOCYTE ESTERASE UR QL STRIP.AUTO: ABNORMAL
LYMPHOCYTES # BLD: 1.5 K/UL (ref 1–5.1)
LYMPHOCYTES NFR BLD: 16.8 %
MCH RBC QN AUTO: 31 PG (ref 26–34)
MCHC RBC AUTO-ENTMCNC: 33.5 G/DL (ref 31–36)
MCV RBC AUTO: 92.5 FL (ref 80–100)
MONOCYTES # BLD: 1.1 K/UL (ref 0–1.3)
MONOCYTES NFR BLD: 12.8 %
NEUTROPHILS # BLD: 5.8 K/UL (ref 1.7–7.7)
NEUTROPHILS NFR BLD: 66.4 %
NITRITE UR QL STRIP.AUTO: NEGATIVE
OSMOLALITY SERPL: 293 MOSM/KG (ref 280–301)
OSMOLALITY UR: 264 MOSM/KG (ref 390–1070)
PERFORMED ON: ABNORMAL
PH UR STRIP.AUTO: 5.5 [PH] (ref 5–8)
PLATELET # BLD AUTO: 370 K/UL (ref 135–450)
PMV BLD AUTO: 7.4 FL (ref 5–10.5)
POTASSIUM SERPL-SCNC: 3.8 MMOL/L (ref 3.5–5.1)
PROT SERPL-MCNC: 6.3 G/DL (ref 6.4–8.2)
PROT UR STRIP.AUTO-MCNC: 30 MG/DL
RBC # BLD AUTO: 3.65 M/UL (ref 4–5.2)
RBC #/AREA URNS HPF: ABNORMAL /HPF (ref 0–4)
REASON FOR REJECTION: NORMAL
REJECTED TEST: NORMAL
SODIUM SERPL-SCNC: 137 MMOL/L (ref 136–145)
SODIUM UR-SCNC: 24 MMOL/L
SP GR UR STRIP.AUTO: 1.01 (ref 1–1.03)
TRIGL SERPL-MCNC: 83 MG/DL (ref 0–150)
UA COMPLETE W REFLEX CULTURE PNL UR: YES
UA DIPSTICK W REFLEX MICRO PNL UR: YES
URN SPEC COLLECT METH UR: ABNORMAL
UROBILINOGEN UR STRIP-ACNC: 0.2 E.U./DL
VLDLC SERPL CALC-MCNC: 17 MG/DL
WBC # BLD AUTO: 8.7 K/UL (ref 4–11)
WBC #/AREA URNS HPF: ABNORMAL /HPF (ref 0–5)

## 2025-05-22 PROCEDURE — 6370000000 HC RX 637 (ALT 250 FOR IP)

## 2025-05-22 PROCEDURE — 2500000003 HC RX 250 WO HCPCS

## 2025-05-22 PROCEDURE — 93306 TTE W/DOPPLER COMPLETE: CPT | Performed by: INTERNAL MEDICINE

## 2025-05-22 PROCEDURE — 80053 COMPREHEN METABOLIC PANEL: CPT

## 2025-05-22 PROCEDURE — 93306 TTE W/DOPPLER COMPLETE: CPT

## 2025-05-22 PROCEDURE — 2500000003 HC RX 250 WO HCPCS: Performed by: NURSE PRACTITIONER

## 2025-05-22 PROCEDURE — 94640 AIRWAY INHALATION TREATMENT: CPT

## 2025-05-22 PROCEDURE — 97166 OT EVAL MOD COMPLEX 45 MIN: CPT

## 2025-05-22 PROCEDURE — 81001 URINALYSIS AUTO W/SCOPE: CPT

## 2025-05-22 PROCEDURE — 2700000000 HC OXYGEN THERAPY PER DAY

## 2025-05-22 PROCEDURE — 85025 COMPLETE CBC W/AUTO DIFF WBC: CPT

## 2025-05-22 PROCEDURE — 93010 ELECTROCARDIOGRAM REPORT: CPT | Performed by: INTERNAL MEDICINE

## 2025-05-22 PROCEDURE — 92526 ORAL FUNCTION THERAPY: CPT

## 2025-05-22 PROCEDURE — 97530 THERAPEUTIC ACTIVITIES: CPT

## 2025-05-22 PROCEDURE — 97162 PT EVAL MOD COMPLEX 30 MIN: CPT

## 2025-05-22 PROCEDURE — 82570 ASSAY OF URINE CREATININE: CPT

## 2025-05-22 PROCEDURE — 83935 ASSAY OF URINE OSMOLALITY: CPT

## 2025-05-22 PROCEDURE — 70551 MRI BRAIN STEM W/O DYE: CPT

## 2025-05-22 PROCEDURE — 2580000003 HC RX 258

## 2025-05-22 PROCEDURE — 84300 ASSAY OF URINE SODIUM: CPT

## 2025-05-22 PROCEDURE — 6360000002 HC RX W HCPCS

## 2025-05-22 PROCEDURE — 92610 EVALUATE SWALLOWING FUNCTION: CPT

## 2025-05-22 PROCEDURE — 83930 ASSAY OF BLOOD OSMOLALITY: CPT

## 2025-05-22 PROCEDURE — 36415 COLL VENOUS BLD VENIPUNCTURE: CPT

## 2025-05-22 PROCEDURE — 87088 URINE BACTERIA CULTURE: CPT

## 2025-05-22 PROCEDURE — 87086 URINE CULTURE/COLONY COUNT: CPT

## 2025-05-22 PROCEDURE — 94761 N-INVAS EAR/PLS OXIMETRY MLT: CPT

## 2025-05-22 PROCEDURE — 93005 ELECTROCARDIOGRAM TRACING: CPT

## 2025-05-22 PROCEDURE — 97535 SELF CARE MNGMENT TRAINING: CPT

## 2025-05-22 PROCEDURE — 1200000000 HC SEMI PRIVATE

## 2025-05-22 PROCEDURE — 80061 LIPID PANEL: CPT

## 2025-05-22 RX ORDER — DEXTROSE MONOHYDRATE 100 MG/ML
INJECTION, SOLUTION INTRAVENOUS CONTINUOUS PRN
Status: DISCONTINUED | OUTPATIENT
Start: 2025-05-22 | End: 2025-05-30 | Stop reason: HOSPADM

## 2025-05-22 RX ORDER — METHOCARBAMOL 750 MG/1
750 TABLET, FILM COATED ORAL 3 TIMES DAILY
Status: DISCONTINUED | OUTPATIENT
Start: 2025-05-22 | End: 2025-05-30 | Stop reason: HOSPADM

## 2025-05-22 RX ORDER — SODIUM CHLORIDE 0.9 % (FLUSH) 0.9 %
5-40 SYRINGE (ML) INJECTION EVERY 12 HOURS SCHEDULED
Status: DISCONTINUED | OUTPATIENT
Start: 2025-05-22 | End: 2025-05-30 | Stop reason: HOSPADM

## 2025-05-22 RX ORDER — MEMANTINE HYDROCHLORIDE 5 MG/1
5 TABLET ORAL DAILY
Status: DISCONTINUED | OUTPATIENT
Start: 2025-05-22 | End: 2025-05-30 | Stop reason: HOSPADM

## 2025-05-22 RX ORDER — INSULIN LISPRO 100 [IU]/ML
0-4 INJECTION, SOLUTION INTRAVENOUS; SUBCUTANEOUS
Status: DISCONTINUED | OUTPATIENT
Start: 2025-05-22 | End: 2025-05-30 | Stop reason: HOSPADM

## 2025-05-22 RX ORDER — CITALOPRAM HYDROBROMIDE 20 MG/1
20 TABLET ORAL DAILY
Status: DISCONTINUED | OUTPATIENT
Start: 2025-05-22 | End: 2025-05-30 | Stop reason: HOSPADM

## 2025-05-22 RX ORDER — IPRATROPIUM BROMIDE AND ALBUTEROL SULFATE 2.5; .5 MG/3ML; MG/3ML
1 SOLUTION RESPIRATORY (INHALATION)
Status: DISCONTINUED | OUTPATIENT
Start: 2025-05-22 | End: 2025-05-25

## 2025-05-22 RX ORDER — LEVOTHYROXINE SODIUM 125 UG/1
125 TABLET ORAL DAILY
Status: DISCONTINUED | OUTPATIENT
Start: 2025-05-22 | End: 2025-05-30 | Stop reason: HOSPADM

## 2025-05-22 RX ORDER — FUROSEMIDE 20 MG/1
20 TABLET ORAL EVERY OTHER DAY
Status: DISCONTINUED | OUTPATIENT
Start: 2025-05-22 | End: 2025-05-30 | Stop reason: HOSPADM

## 2025-05-22 RX ORDER — SODIUM CHLORIDE 9 MG/ML
INJECTION, SOLUTION INTRAVENOUS PRN
Status: DISCONTINUED | OUTPATIENT
Start: 2025-05-22 | End: 2025-05-30 | Stop reason: HOSPADM

## 2025-05-22 RX ORDER — PANTOPRAZOLE SODIUM 40 MG/1
40 TABLET, DELAYED RELEASE ORAL
Status: DISCONTINUED | OUTPATIENT
Start: 2025-05-22 | End: 2025-05-30 | Stop reason: HOSPADM

## 2025-05-22 RX ORDER — METOPROLOL TARTRATE 25 MG/1
25 TABLET, FILM COATED ORAL DAILY
Status: DISCONTINUED | OUTPATIENT
Start: 2025-05-22 | End: 2025-05-23

## 2025-05-22 RX ORDER — POLYETHYLENE GLYCOL 3350 17 G/17G
17 POWDER, FOR SOLUTION ORAL DAILY PRN
Status: DISCONTINUED | OUTPATIENT
Start: 2025-05-22 | End: 2025-05-30 | Stop reason: HOSPADM

## 2025-05-22 RX ORDER — ACETAMINOPHEN 650 MG/1
650 SUPPOSITORY RECTAL EVERY 6 HOURS PRN
Status: DISCONTINUED | OUTPATIENT
Start: 2025-05-22 | End: 2025-05-30 | Stop reason: HOSPADM

## 2025-05-22 RX ORDER — GLUCAGON 1 MG/ML
1 KIT INJECTION PRN
Status: DISCONTINUED | OUTPATIENT
Start: 2025-05-22 | End: 2025-05-30 | Stop reason: HOSPADM

## 2025-05-22 RX ORDER — ASPIRIN 81 MG/1
81 TABLET ORAL DAILY
Status: DISCONTINUED | OUTPATIENT
Start: 2025-05-22 | End: 2025-05-30 | Stop reason: HOSPADM

## 2025-05-22 RX ORDER — ACETAMINOPHEN 325 MG/1
650 TABLET ORAL EVERY 6 HOURS PRN
Status: DISCONTINUED | OUTPATIENT
Start: 2025-05-22 | End: 2025-05-30 | Stop reason: HOSPADM

## 2025-05-22 RX ORDER — ONDANSETRON 4 MG/1
4 TABLET, ORALLY DISINTEGRATING ORAL EVERY 8 HOURS PRN
Status: DISCONTINUED | OUTPATIENT
Start: 2025-05-22 | End: 2025-05-30 | Stop reason: HOSPADM

## 2025-05-22 RX ORDER — GABAPENTIN 300 MG/1
300 CAPSULE ORAL DAILY
Status: DISCONTINUED | OUTPATIENT
Start: 2025-05-22 | End: 2025-05-30 | Stop reason: HOSPADM

## 2025-05-22 RX ORDER — NIFEDIPINE 30 MG
30 TABLET, EXTENDED RELEASE ORAL DAILY
Status: DISCONTINUED | OUTPATIENT
Start: 2025-05-22 | End: 2025-05-23

## 2025-05-22 RX ORDER — LOSARTAN POTASSIUM 25 MG/1
50 TABLET ORAL DAILY
Status: DISCONTINUED | OUTPATIENT
Start: 2025-05-22 | End: 2025-05-30 | Stop reason: HOSPADM

## 2025-05-22 RX ORDER — ATORVASTATIN CALCIUM 40 MG/1
40 TABLET, FILM COATED ORAL DAILY
Status: DISCONTINUED | OUTPATIENT
Start: 2025-05-22 | End: 2025-05-30 | Stop reason: HOSPADM

## 2025-05-22 RX ORDER — HEPARIN SODIUM 5000 [USP'U]/ML
5000 INJECTION, SOLUTION INTRAVENOUS; SUBCUTANEOUS EVERY 8 HOURS SCHEDULED
Status: DISCONTINUED | OUTPATIENT
Start: 2025-05-22 | End: 2025-05-30 | Stop reason: HOSPADM

## 2025-05-22 RX ORDER — LORAZEPAM 2 MG/ML
0.5 INJECTION INTRAMUSCULAR
Status: DISCONTINUED | OUTPATIENT
Start: 2025-05-22 | End: 2025-05-30 | Stop reason: HOSPADM

## 2025-05-22 RX ORDER — ONDANSETRON 2 MG/ML
4 INJECTION INTRAMUSCULAR; INTRAVENOUS EVERY 6 HOURS PRN
Status: DISCONTINUED | OUTPATIENT
Start: 2025-05-22 | End: 2025-05-30 | Stop reason: HOSPADM

## 2025-05-22 RX ORDER — DONEPEZIL HYDROCHLORIDE 5 MG/1
10 TABLET, FILM COATED ORAL NIGHTLY
Status: DISCONTINUED | OUTPATIENT
Start: 2025-05-22 | End: 2025-05-30 | Stop reason: HOSPADM

## 2025-05-22 RX ORDER — SODIUM CHLORIDE 9 MG/ML
INJECTION, SOLUTION INTRAVENOUS CONTINUOUS
Status: ACTIVE | OUTPATIENT
Start: 2025-05-22 | End: 2025-05-23

## 2025-05-22 RX ORDER — SODIUM CHLORIDE 0.9 % (FLUSH) 0.9 %
5-40 SYRINGE (ML) INJECTION PRN
Status: DISCONTINUED | OUTPATIENT
Start: 2025-05-22 | End: 2025-05-30 | Stop reason: HOSPADM

## 2025-05-22 RX ADMIN — PANTOPRAZOLE SODIUM 40 MG: 40 TABLET, DELAYED RELEASE ORAL at 09:23

## 2025-05-22 RX ADMIN — HEPARIN SODIUM 5000 UNITS: 5000 INJECTION INTRAVENOUS; SUBCUTANEOUS at 06:41

## 2025-05-22 RX ADMIN — METHOCARBAMOL 750 MG: 750 TABLET ORAL at 14:19

## 2025-05-22 RX ADMIN — METHOCARBAMOL 750 MG: 750 TABLET ORAL at 19:58

## 2025-05-22 RX ADMIN — ATORVASTATIN CALCIUM 40 MG: 40 TABLET, FILM COATED ORAL at 09:23

## 2025-05-22 RX ADMIN — IPRATROPIUM BROMIDE AND ALBUTEROL SULFATE 1 DOSE: 2.5; .5 SOLUTION RESPIRATORY (INHALATION) at 12:09

## 2025-05-22 RX ADMIN — GABAPENTIN 300 MG: 300 CAPSULE ORAL at 09:24

## 2025-05-22 RX ADMIN — ASPIRIN 81 MG: 81 TABLET, COATED ORAL at 09:23

## 2025-05-22 RX ADMIN — SODIUM CHLORIDE: 0.9 INJECTION, SOLUTION INTRAVENOUS at 17:11

## 2025-05-22 RX ADMIN — LEVOTHYROXINE SODIUM 125 MCG: 0.12 TABLET ORAL at 09:23

## 2025-05-22 RX ADMIN — IPRATROPIUM BROMIDE AND ALBUTEROL SULFATE 1 DOSE: 2.5; .5 SOLUTION RESPIRATORY (INHALATION) at 19:27

## 2025-05-22 RX ADMIN — DONEPEZIL HYDROCHLORIDE 10 MG: 5 TABLET, FILM COATED ORAL at 19:58

## 2025-05-22 RX ADMIN — SODIUM CHLORIDE, PRESERVATIVE FREE 10 ML: 5 INJECTION INTRAVENOUS at 09:22

## 2025-05-22 RX ADMIN — MICONAZOLE NITRATE: 2 POWDER TOPICAL at 09:22

## 2025-05-22 RX ADMIN — HEPARIN SODIUM 5000 UNITS: 5000 INJECTION INTRAVENOUS; SUBCUTANEOUS at 22:03

## 2025-05-22 RX ADMIN — ACETAMINOPHEN 650 MG: 325 TABLET ORAL at 19:57

## 2025-05-22 RX ADMIN — SODIUM CHLORIDE: 0.9 INJECTION, SOLUTION INTRAVENOUS at 03:49

## 2025-05-22 RX ADMIN — METHOCARBAMOL 750 MG: 750 TABLET ORAL at 09:23

## 2025-05-22 RX ADMIN — IPRATROPIUM BROMIDE AND ALBUTEROL SULFATE 1 DOSE: 2.5; .5 SOLUTION RESPIRATORY (INHALATION) at 16:28

## 2025-05-22 RX ADMIN — MICONAZOLE NITRATE: 2 POWDER TOPICAL at 19:58

## 2025-05-22 RX ADMIN — MEMANTINE 5 MG: 5 TABLET ORAL at 09:24

## 2025-05-22 RX ADMIN — HEPARIN SODIUM 5000 UNITS: 5000 INJECTION INTRAVENOUS; SUBCUTANEOUS at 14:21

## 2025-05-22 RX ADMIN — CITALOPRAM HYDROBROMIDE 20 MG: 20 TABLET ORAL at 09:24

## 2025-05-22 ASSESSMENT — PAIN SCALES - GENERAL
PAINLEVEL_OUTOF10: 10
PAINLEVEL_OUTOF10: 10
PAINLEVEL_OUTOF10: 3
PAINLEVEL_OUTOF10: 3

## 2025-05-22 ASSESSMENT — PAIN DESCRIPTION - DESCRIPTORS
DESCRIPTORS: ACHING

## 2025-05-22 ASSESSMENT — PAIN DESCRIPTION - PAIN TYPE: TYPE: ACUTE PAIN;CHRONIC PAIN

## 2025-05-22 ASSESSMENT — PAIN DESCRIPTION - LOCATION
LOCATION: BACK;HEAD
LOCATION: BACK
LOCATION: BACK
LOCATION: LEG

## 2025-05-22 ASSESSMENT — PAIN - FUNCTIONAL ASSESSMENT: PAIN_FUNCTIONAL_ASSESSMENT: PREVENTS OR INTERFERES SOME ACTIVE ACTIVITIES AND ADLS

## 2025-05-22 ASSESSMENT — PAIN DESCRIPTION - ORIENTATION: ORIENTATION: LEFT

## 2025-05-22 NOTE — CONSULTS
[facility] Consult Note           Reason for Consult:  Acute kidney injury, Hyponatremia  Requesting Physician:  Dr. Sierra    Chief Complaint:  Headaches  History Obtained From:  patient, electronic medical record    History of Present Ilness:    85 year old female with DM, HTN, prior TIANA requiring CRRT many years ago who presents with headaches.  We have been asked to assist in further mgmt of her TIANA.    SCr 3.5 on admission, better at 2.3 with initial IVF's, prior 0.7 from earlier in May 2025.  UA trace blood, 1+ protein, 500 glucose, 21-50 wbc's, 0-2 rbc's.  Urine sodium 24.  Bp's as low as ~90's/40's on admission, better now.  +weak, intake reduced, no shortness of breath, abdominal pain, or fevers.    Past Medical History:        Diagnosis Date    Age-related osteoporosis without current pathological fracture 2/21/2025    Anesthesia PONV    Anxiety and depression     Arthritis     osteoporosis    Cancer (HCC)     Thyroid    Cellulitis 11/2012    Left Foot and Ankle    Colonization status 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/2011    Fall     Fibromyalgia     GERD (gastroesophageal reflux disease)     Hearing loss     [place] (hard of hearing)     Hypertension     Infection     RIGHT ANKLE    Liver disease     fatty tissue on liver    MRSA (methicillin resistant staph aureus) culture positive 5/8/12; 5/2/12,9/19/12    Ankle    MRSA (methicillin resistant staph aureus) culture positive 10/8/13    ankle    Nausea & vomiting     PONV    Renal failure     acute renal failure on a previous admission    Septic shock(785.52)     Sleep apnea     does not use cpap    Tailbone injury FEB, 2013    Thyroid disease        Past Surgical History:        Procedure Laterality Date    ABDOMEN SURGERY  2004    Lap  24 05/22/2025 07:59 AM    BUN 42 05/22/2025 07:59 AM    CREATININE 2.3 05/22/2025 07:59 AM    CALCIUM 8.2 05/22/2025 07:59 AM    GFRAA >60 06/22/2021 06:00 AM    GFRAA >60 05/12/2013 06:43 AM    LABGLOM 20 05/22/2025 07:59 AM    GLUCOSE 117 05/22/2025 07:59 AM         Assessment/    - Acute kidney injury - pre-renal/renal hypoperfusion injury    - Hyponatremia       Plan/    - Continue IVF's with NS 75 ml/hour  - Lasix, jardiance, losartan, nifedipine on hold  - Trend labs, bp's, weights, & urine output      Thank you for the consultation.  Please do not hesitate to call with questions.    ____________________________________  Butch Barakat MD  [facility]  [email or web address]  Office: [phone]

## 2025-05-22 NOTE — PROGRESS NOTES
Physical Therapy  Facility/Department: [place] [address] - MED SURG/ORTHO  Physical Therapy Initial Assessment    Name: Courtney Taylor  : 1940  MRN: [id number]  Date of Service: 2025    Discharge Recommendations:  Subacute/Skilled Nursing Facility   PT Equipment Recommendations  Equipment Needed: No      Therapy discharge recommendations are subject to collaboration from the patient’s interdisciplinary healthcare team, including MD and case management recommendations.    Barriers to Home Discharge:   [] Steps to access home entry or bed/bath:   [x] Unable to transfer, ambulate, or propel wheelchair household distances without assist   [x] Limited available assist at home upon discharge    [x] Patient or family requests d/c to post-acute facility    [] Poor cognition/safety awareness for d/c to home alone    [] Unable to maintain ordered weight bearing status    [] Patient with salient signs of long-standing immobility   [x] Decreased independence with ADLs   [x] Increased risk for falls   [] Other:    If pt is unable to be seen after this session, please let this note serve as discharge summary.  Please see case management note for discharge disposition.  Thank you.   Patient Diagnosis(es): The primary encounter diagnosis was TIANA (acute kidney injury). Diagnoses of Acute hyponatremia, Nonintractable headache, unspecified chronicity pattern, unspecified headache type, Other chronic pain, Dehydration, and Dysarthria were also pertinent to this visit.  Past Medical History:  has a past medical history of Age-related osteoporosis without current pathological fracture, Anesthesia, Anxiety and depression, Arthritis, Cancer (HCC), Cellulitis, Colonization status, DDD (degenerative disc disease), lumbosacral, Diabetes mellitus ([facility]), Diabetic neuropathy ([facility]), DJD (degenerative joint disease) of lumbar spine, Fall, Fibromyalgia, GERD (gastroesophageal reflux disease), Hearing loss, [place] (hard of hearing), Hypertension,  Time   Individual Concurrent Group Co-treatment   Time In 0829         Time Out 0909         Minutes 40         Timed Code Treatment Minutes: 30 Minutes       Jay Ramirez, SPT

## 2025-05-22 NOTE — RT PROTOCOL NOTE
RT Inhaler-Nebulizer Bronchodilator Protocol Note    There is a bronchodilator order in the chart from a provider indicating to follow the RT Bronchodilator Protocol and there is an “Initiate RT Inhaler-Nebulizer Bronchodilator Protocol” order as well (see protocol at bottom of note).    CXR Findings:  XR CHEST PORTABLE  Result Date: 5/21/2025  No acute radiographic abnormality of the chest. Tortuous widened aorta, stable Electronically signed by John Mcdonald DO      The findings from the last RT Protocol Assessment were as follows:   History Pulmonary Disease: None or smoker <15 pack years  Respiratory Pattern: Dyspnea on exertion or RR 21-25 bpm  Breath Sounds: Inspiratory and expiratory or bilateral wheezing and/or rhonchi  Cough: Strong, spontaneous, non-productive  Indication for Bronchodilator Therapy: On home bronchodilators  Bronchodilator Assessment Score: 8    Aerosolized bronchodilator medication orders have been revised according to the RT Inhaler-Nebulizer Bronchodilator Protocol below.    Respiratory Therapist to perform RT Therapy Protocol Assessment initially then follow the protocol.  Repeat RT Therapy Protocol Assessment PRN for score 0-3 or on second treatment, BID, and PRN for scores above 3.    No Indications - adjust the frequency to every 6 hours PRN wheezing or bronchospasm, if no treatments needed after 48 hours then discontinue using Per Protocol order mode.     If indication present, adjust the RT bronchodilator orders based on the Bronchodilator Assessment Score as indicated below.  Use Inhaler orders unless patient has one or more of the following: on home nebulizer, not able to hold breath for 10 seconds, is not alert and oriented, cannot activate and use MDI correctly, or respiratory rate 25 breaths per minute or more, then use the equivalent nebulizer order(s) with same Frequency and PRN reasons based on the score.  If a patient is on this medication at home then do not

## 2025-05-22 NOTE — H&P
[facility] Medicine History & Physical    V 5.1    Date of Admission: 5/22/2025    Date of Service:  Pt seen/examined on 5/22/2025    [x]Admitted to Inpatient with expected LOS greater than two midnights due to medical therapy.  []Placed in Observation status.    Chief Admission Complaint: Headache    Presenting Admission History:      85 y.o. female who presented to [facility] with headache.  PMHx significant for diabetes, previous TIANA/renal failure, chronic back and left hip pain, hard of hearing, fibromyalgia, anxiety, thyroid cancer, diabetes.  She reports headache for 2 days with bodyaches.  No fevers or chills.  Family states that patient has had renal injury and hyponatremia in the past.  Patient lives at a nursing home (possibly [facility]) and family is unaware of her p.o. intake.  Patient's left hip pain and body pain has been chronic for years.  Per grandchildren, patient was also having slurred speech and her face was sagging at nursing home.  No other known weakness or deficits in all this seems to have resolved.  Patient has had hypotension in the ED.  Blood work in the ED significant for hemoglobin 10.7, sodium 128, creatinine 3.5, GFR 12.  Chest x-ray showed no acute abnormality.  CT head showed no acute abnormality.  Patient was given 2 fluid boluses.  Will admit patient for dehydration, TIANA, hyponatremia, CVA workup.    Assessment/Plan:      Hyponatremia  - Likely secondary to decreased volume status from decreased p.o. intake  - Urine sodium, urine osmolality, serum osmolality pending  - Continue IV fluids  - Nephrology consulted    TIANA  - Creatinine 3.5  - Continue to monitor CMP  - Continue IV fluids  - Nephrology consulted    Hypotension  - Likely secondary to decreased volume status from decreased p.o. intake  - Continue IV fluids  - Hold home BP meds    Headache  - Resolved  - Secondary to dehydration    Dysarthria  Facial droop  - Resolved upon exam  - CT  S1/S2 without murmurs, rubs or gallops.  Abdomen:  Soft, non-tender, non-distended with normal bowel sounds.  Musculoskeletal:  No clubbing, cyanosis or edema bilaterally.  Full range of motion without deformity.  Neurologic:  Neurovascularly intact without any focal sensory/motor deficits. Cranial nerves: II-XII intact, grossly non-focal.  Slightly confused at baseline  Psychiatric:  Alert and oriented, thought content appropriate, normal insight  Skin:  Skin color, texture, turgor normal.  No rashes or lesions.  Capillary Refill:  Brisk,< 3 seconds   Peripheral Pulses:  +2 palpable, equal bilaterally     Diet: []Adult/General  []Cardiac  []Diabetic  []Low Fat  [x]NPO (until passes swallow screen, then diabetic diet)  []NPO after Midnight  []Other     DVT Prophylaxis: PPX dose LMWH    Code status: []Full  []DNR/CCA  []Limited (DNR/CCA with Do Not Intubate)  []DNRCC  Surrogate Decision Maker:   Name Home Phone Work Phone Mobile Phone Relationship [facility]   MARYJO DUNLAP [phone] Child No   NOEMY DRUMMOND [phone] Other No        PT/OT Eval Status: PT/OT continuing to assess    Anticipated Discharge Day/Date:  2 days    Anticipated Discharge Location: [facility]    Consults:      None    I personally have obtained, updated and/or reviewed the patient’s medication list on 5/22/2025   --------------------------------------------------------------------------------------------------------------------------------------------------------------------    Imaging:     XR CHEST PORTABLE  Result Date: 5/21/2025  EXAM: XR CHEST PORTABLE. DATE: 5/21/2025 22:22 EDT. INDICATION: Shortness of Breath COMPARISON: May 17, 2024 TECHNIQUE: Standard per department protocol. FINDINGS: Medical devices: None Cardiomediastinal silhouette mildly enlarged, tortuous aorta  No focal consolidation, pleural fluid collection, or pneumothorax.  Osseous structures unremarkable.     No acute

## 2025-05-22 NOTE — ED PROVIDER NOTES
Emergency Department Attending Physician Note  Location: [facility] EMERGENCY DEPARTMENT  5/21/2025       Pt Name: Courtney Taylor  MRN: [id number]  Birthdate 1940    Date of evaluation: 5/21/2025  Provider: RICCI SALDANA DO  PCP: Noa Cardoza PA    Note Started: 10:20 PM EDT 5/21/25    CHIEF COMPLAINT  Chief Complaint   Patient presents with    Headache     X2 days       ROOM: 3    HISTORY OF PRESENT ILLNESS:  History obtained by patient. Limitations to history : None.     Courtney Taylor is a 85 y.o. female with a significant PMHx of diabetes, previous TIANA/renal failure, chronic back and left hip pain, hard of hearing, fibromyalgia, anxiety, and multiple other comorbidities as listed below, here in the emergency department today with concerns of what she describes as a headache, and bodyaches.  No known fevers or chills.  She has had a headache for 2 days.  Otherwise history is somewhat limited, per patient.    However, as MDM below, once we discover her renal injury, hyponatremia, family says she has had that before.  They do not really know how her eating and drinking habits they are at the nursing home.  They have not heard of any falls or injuries.    Regarding her left hip pain and body pain, granddaughter tells me \"that has been going on for years.\"          Nursing Notes were all reviewed and agreed with or any disagreements were addressed in the HPI.      MEDICAL HISTORY  Past Medical History:   Diagnosis Date    Age-related osteoporosis without current pathological fracture 2/21/2025    Anesthesia PONV    Anxiety and depression     Arthritis     osteoporosis    Cancer (HCC)     Thyroid    Cellulitis 11/2012    Left Foot and Ankle    Colonization status 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/2011    Fall

## 2025-05-22 NOTE — PROGRESS NOTES
4 Eyes Skin Assessment     NAME:  Courtney Taylor  YOB: 1940  MEDICAL RECORD NUMBER:  [id number]    The patient is being assessed for  Admission    I agree that at least one RN has performed a thorough Head to Toe Skin Assessment on the patient. ALL assessment sites listed below have been assessed.      Areas assessed by both nurses:    Head, Face, Ears, Shoulders, Back, Chest, Arms, Elbows, Hands, Sacrum. Buttock, Coccyx, Ischium, Legs. Feet and Heels, and Under Medical Devices         Does the Patient have a Wound? No noted wound(s)       Carlos Alberto Prevention initiated by RN: Yes  Wound Care Orders initiated by RN: No    Pressure Injury (Stage 3,4, Unstageable, DTI, NWPT, and Complex wounds) if present, place Wound referral order by RN under : No    Patient has pink heels, red buttocks, redness underneath breast    New Ostomies, if present place, Ostomy referral order under : No     Nurse 1 eSignature: Electronically signed by Destini Parker RN on 5/22/25 at 3:27 AM EDT    **SHARE this note so that the co-signing nurse can place an eSignature**    Nurse 2 eSignature: Electronically signed by Rachel Leon RN on 5/22/25 at 3:43 AM EDT

## 2025-05-22 NOTE — TELEPHONE ENCOUNTER
Call from Wilma from [facility] on [address]  Stating pt had new pt appt today @ 5/22 @ 1pm and pt is in hospital then rehab and pt just wants to wait until she goes back home to reschedule her new pt appt    # [phone]  Caller was Wilma

## 2025-05-22 NOTE — PLAN OF CARE
Problem: Chronic Conditions and Co-morbidities  Goal: Patient's chronic conditions and co-morbidity symptoms are monitored and maintained or improved  Outcome: Progressing     Problem: Discharge Planning  Goal: Discharge to home or other facility with appropriate resources  Outcome: Progressing     Problem: Pain  Goal: Verbalizes/displays adequate comfort level or baseline comfort level  Outcome: Progressing  Flowsheets (Taken 5/22/2025 0210)  Verbalizes/displays adequate comfort level or baseline comfort level:   Encourage patient to monitor pain and request assistance   Assess pain using appropriate pain scale   Administer analgesics based on type and severity of pain and evaluate response   Implement non-pharmacological measures as appropriate and evaluate response   Consider cultural and social influences on pain and pain management   Notify Licensed Independent Practitioner if interventions unsuccessful or patient reports new pain     Problem: Skin/Tissue Integrity  Goal: Skin integrity remains intact  Description: 1.  Monitor for areas of redness and/or skin breakdown2.  Assess vascular access sites hourly3.  Every 4-6 hours minimum:  Change oxygen saturation probe site4.  Every 4-6 hours:  If on nasal continuous positive airway pressure, respiratory therapy assess nares and determine need for appliance change or resting period  Outcome: Progressing  Flowsheets (Taken 5/22/2025 0257)  Skin Integrity Remains Intact:   Monitor for areas of redness and/or skin breakdown   Assess vascular access sites hourly   Every 4-6 hours minimum:  Change oxygen saturation probe site   Check visual cues for pain   Monitor skin under medical devices   Turn and reposition as indicated   Pressure redistribution bed/mattress (bed type)   Assess need for specialty bed     Problem: ABCDS Injury Assessment  Goal: Absence of physical injury  Outcome: Progressing  Flowsheets (Taken 5/22/2025 0257)  Absence of Physical Injury:  Implement safety measures based on patient assessment     Problem: Safety - Adult  Goal: Free from fall injury  Outcome: Progressing

## 2025-05-22 NOTE — ACP (ADVANCE CARE PLANNING)
Advance Care Planning     Advance Care Planning Inpatient Note  Spiritual Care Department    Today's Date: 5/22/2025  Unit: [facility] C5 - MED SURG/ORTHO    Received request from patient.  Upon review of chart and communication with care team, Spiritual Care will defer advance care planning with patient at this time.. Patient was/were present in the room during visit.    Goals of ACP Conversation:  Discuss advance care planning documents    Health Care Decision Makers:       Primary Decision Maker: Thalia Hoffman - Child - [phone]    Secondary Decision Maker: Darren Taylor - Child - [phone]  Summary:  No Decision Maker named by patient at this time    Advance Care Planning Documents (Patient Wishes):  None     Assessment:   consulted with nurse and determined that patient is not sufficiently alert at this time to complete documents.     Interventions:  Patient DECLINED ACP conversation    Care Preferences Communicated:   No    Outcomes/Plan:  ACP Discussion: Postponed    Electronically signed by Chaplain Christie on 5/22/2025 at 2:56 PM

## 2025-05-22 NOTE — PLAN OF CARE
Problem: Chronic Conditions and Co-morbidities  Goal: Patient's chronic conditions and co-morbidity symptoms are monitored and maintained or improved  5/22/2025 1120 by Apple Lovell RN  Outcome: Progressing  Flowsheets (Taken 5/22/2025 0328 by Destini Parker, RN)  Care Plan - Patient's Chronic Conditions and Co-Morbidity Symptoms are Monitored and Maintained or Improved:   Monitor and assess patient's chronic conditions and comorbid symptoms for stability, deterioration, or improvement   Collaborate with multidisciplinary team to address chronic and comorbid conditions and prevent exacerbation or deterioration   Update acute care plan with appropriate goals if chronic or comorbid symptoms are exacerbated and prevent overall improvement and discharge     Problem: Discharge Planning  Goal: Discharge to home or other facility with appropriate resources  5/22/2025 1120 by Apple Lovell RN  Outcome: Progressing  Flowsheets (Taken 5/22/2025 0328 by Destini Parker, RN)  Discharge to home or other facility with appropriate resources:   Identify barriers to discharge with patient and caregiver   Arrange for needed discharge resources and transportation as appropriate   Identify discharge learning needs (meds, wound care, etc)   Refer to discharge planning if patient needs post-hospital services based on physician order or complex needs related to functional status, cognitive ability or social support system     Problem: Pain  Goal: Verbalizes/displays adequate comfort level or baseline comfort level  5/22/2025 1120 by Apple Lovell RN  Outcome: Progressing     Problem: Skin/Tissue Integrity  Goal: Skin integrity remains intact  Description: 1.  Monitor for areas of redness and/or skin breakdown2.  Assess vascular access sites hourly3.  Every 4-6 hours minimum:  Change oxygen saturation probe site4.  Every 4-6 hours:  If on nasal continuous positive airway pressure, respiratory therapy assess nares and determine need for

## 2025-05-22 NOTE — ACP (ADVANCE CARE PLANNING)
Advance Care Planning   The patient has the following advanced directives on file:  Advance Directives       Power of  Living Will ACP-Advance Directive ACP-Power of     Not on File Not on File Not on File Not on File            The patient has appointed the following active healthcare agents:    Primary Decision Maker: Thalia Hoffman - Child - [phone]    Secondary Decision Maker: Darren Taylor - Child - [phone]    The Patient has the following current code status:    Code Status: Full Code    Visit Documentation:  I discussed Advance Care Planning with Courtney Taylor today which included the importance of making their choices for care and treatment in the case of a health event that adversely affects their decision-making abilities. She has not completed the Advance Care Directives. She does not have an active health care agent at this time.  Courtney Taylor was encouraged to complete the declaration forms and provide a signed copy of her medical records. She was referred to fabiola to assist with completion.  I advised patient we would continue this discussion at future visits.     ISSA ROMAN RN  5/22/2025

## 2025-05-22 NOTE — ED NOTES
Pt presents to ED for headache x 3 days. Pt states that it has progressively become worse. PT O2 in triage 86% RA placed on 2LNC

## 2025-05-22 NOTE — PROGRESS NOTES
Occupational Therapy  Facility/Department: [place] [address] - MED SURG/ORTHO  Occupational Therapy Initial Assessment and Treatment Note     Name: Courtney Taylor  : 1940  MRN: [id number]  Date of Service: 2025    Discharge Recommendations:  Subacute/Skilled Nursing Facility   Therapy discharge recommendations are subject to collaboration from the patient’s interdisciplinary healthcare team, including MD and case management recommendations.    Barriers to Home Discharge:   [] Steps to access home entry or bed/bath:   [x] Unable to transfer, ambulate, or propel wheelchair household distances without assist   [x] Limited available assist at home upon discharge    [x] Patient or family requests d/c to post-acute facility    [] Poor cognition/safety awareness for d/c to home alone    [] Unable to maintain ordered weight bearing status    [] Patient with salient signs of long-standing immobility   [x] Decreased independence with ADLs   [x] Increased risk for falls   [] Other:    If pt is unable to be seen after this session, please let this note serve as discharge summary.  Please see case management note for discharge disposition.  Thank you.     Patient Diagnosis(es): The primary encounter diagnosis was TIANA (acute kidney injury). Diagnoses of Acute hyponatremia, Nonintractable headache, unspecified chronicity pattern, unspecified headache type, Other chronic pain, Dehydration, and Dysarthria were also pertinent to this visit.  Past Medical History:  has a past medical history of Age-related osteoporosis without current pathological fracture, Anesthesia, Anxiety and depression, Arthritis, Cancer (HCC), Cellulitis, Colonization status, DDD (degenerative disc disease), lumbosacral, Diabetes mellitus (HCC), Diabetic neuropathy (HCC), DJD (degenerative joint disease) of lumbar spine, Fall, Fibromyalgia, GERD (gastroesophageal reflux disease), Hearing loss, [place] (hard of hearing), Hypertension, Infection, Liver disease,  MRSA (methicillin resistant staph aureus) culture positive, MRSA (methicillin resistant staph aureus) culture positive, Nausea & vomiting, Renal failure, Septic shock(785.52), Sleep apnea, Tailbone injury, and Thyroid disease.  Past Surgical History:  has a past surgical history that includes Tubal ligation; Abdomen surgery (2004); back surgery; other surgical history (4/5/2012); Thyroid surgery; Ankle surgery (5-2-2012); Ankle surgery (5/4/12); Ankle surgery (5/7/12); other surgical history (05/10/2012); other surgical history (05/14/12); other surgical history (7/16/2012); other surgical history (07/19/12); other surgical history (9/19/12); Total ankle arthroplasty (2/2012); other surgical history; Wound debridement (12/04/12); Tonsillectomy; Endoscopy, colon, diagnostic; Colonoscopy; joint replacement; other surgical history (Right, 7/31/13); other surgical history (10-8-13); Ankle surgery (Right, 10/10/2013); and Ankle surgery (Right, 1/2/13).    Treatment Diagnosis: deconditioning    Assessment  Performance deficits / Impairments: Decreased functional mobility ;Decreased ADL status;Decreased safe awareness;Decreased strength;Decreased ROM;Decreased endurance;Decreased balance;Decreased posture;Decreased coordination    Assessment: Pt seen for OT eval and tx after admission for TIANA from SNF.  Pt lives alone in  apartment at baseline.  During OT, pt required min A to stand from EOB 2x with RW. She did not transfer to chair d/t need to return to bed for skin care after 2 episodes of incontinence.  Mod A x2 provided for sit to supine. Pt reported 8/10 R sided headache when she returned to bed--RN notified. Vitals were stable in comparison with previous readings. Recommend SNF for skilled OT.  Cont OT in acute care.  Co-tx collaboration this date to safely meet goals and will have better occupational performance outcomes with in a co-treatment than 1:1 session.    Treatment Diagnosis: deconditioning  Prognosis:

## 2025-05-22 NOTE — PROGRESS NOTES
myelopathy 07/16/2013    Degeneration of lumbar or lumbosacral intervertebral disc 05/17/2013    Osteoarthritis of lumbar spine 05/17/2013    Hypothyroid 11/20/2012    Chronic pain disorder 02/20/2012    Diabetes mellitus with coincident hypertension (HCC)     Type 2 diabetes mellitus with diabetic microalbuminuria, without long-term current use of insulin (HCC)      Past Medical History:   Diagnosis Date    Age-related osteoporosis without current pathological fracture 2/21/2025    Anesthesia PONV    Anxiety and depression     Arthritis     osteoporosis    Cancer (HCC)     Thyroid    Cellulitis 11/2012    Left Foot and Ankle    Colonization status 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/2011    Fall     Fibromyalgia     GERD (gastroesophageal reflux disease)     Hearing loss     [place] (hard of hearing)     Hypertension     Infection     RIGHT ANKLE    Liver disease     fatty tissue on liver    MRSA (methicillin resistant staph aureus) culture positive 5/8/12; 5/2/12,9/19/12    Ankle    MRSA (methicillin resistant staph aureus) culture positive 10/8/13    ankle    Nausea & vomiting     PONV    Renal failure     acute renal failure on a previous admission    Septic shock(785.52)     Sleep apnea     does not use cpap    Tailbone injury FEB, 2013    Thyroid disease      Past Surgical History:   Procedure Laterality Date    ABDOMEN SURGERY  2004    Lap Band    ANKLE SURGERY  5-2-2012    Incision, irrigation and debridement right ankle    ANKLE SURGERY  5/4/12    incision and drainage of right ankle    ANKLE SURGERY  5/7/12    incision and drainage right foot.    ANKLE SURGERY Right 10/10/2013    INCISION AND DEBRIDEMENT WITH REMOVAL OF PROSTHESIS RIGHT ANKLE WITH INSERTION OF ANTIBIOTIC SPACER    ANKLE SURGERY Right 1/2/13    RIGHT ANKLE REMOVAL OF  today, and RN has been unable to determine pt's cognitive status at baseline.      Pt's goals: \"to get something to eat\". Pt states that she is \"Very hungry\", but does not notice that ehr lunch tray is fully prepped and ready to be eating directly in front of her. Pt will feed herself once SLP places food (tuna sandwich) in her hands.      Vitals/labs:   Temp: n/a  SpO2: 95  RR: 12/min  BP: n/a  HR: 100  O2 device: 1lpm O2 via NC        Cranial nerve exam:   CN V (trigeminal): ophthalmic, maxillary, and mandibular facial sensation- WFL  CN VII (facial): WFL  CN IX/X (glossopharyngeal/vagus): MPT: Reduced; pitch range: Reduced; vocal quality: WFL; cough: Strong-perceptually  CN XII (hypoglossal): WFL    Oral Care Status:    Oral care WFL    Oral Care Completed?   [] Yes   [x] No  Not warranted-oral care adequate upon assessment    PO trials:   IDDSI 0 (thin): Assessed via cup and straw: no anterior bolus loss , no clinical s/s of aspiration, no coughing, dry vocal quality, and no throat clearing    IDDSI 4 (puree): no anterior bolus loss , suspect functional A-P bolus transit, swallow timing subjectively appears timely, oral clearance grossly WFL, no clinical s/s of aspiration, no coughing, and dry vocal quality      IDDSI 6 (soft and bite sized): no anterior bolus loss , suspect functional A-P bolus transit, swallow timing subjectively appears timely, oral clearance grossly WFL, no clinical s/s of aspiration, no coughing, and dry vocal quality    IDDSI 7 (regular): no anterior bolus loss , prolonged but functional mastication, suspect functional A-P bolus transit, swallow timing subjectively appears timely, oral clearance grossly WFL, no clinical s/s of aspiration, no coughing, and dry vocal quality    3 oz water: DNT      Dysphagia Diagnosis: Swallow function appears WFL and No clinical indications of dysphagia        Impressions:  Oral phase of swallow grossly appears WNL. Mildly prolonged but functional

## 2025-05-22 NOTE — PROGRESS NOTES
[facility] Medicine Progress Note  V 5.1      Date of Admission: 5/21/2025    Hospital Day: 2      Chief Admission Complaint:  Headache    Subjective:  She reports pain all over. Denies SOB or nausea.     Presenting Admission History:       85 y.o. female who presented to [facility] with headache.  PMHx significant for diabetes, previous TIANA/renal failure, chronic back and left hip pain, hard of hearing, fibromyalgia, anxiety, thyroid cancer, diabetes.  She reports headache for 2 days with bodyaches.  No fevers or chills.  Family states that patient has had renal injury and hyponatremia in the past.  Patient lives at a nursing home (possibly [facility]) and family is unaware of her p.o. intake.  Patient's left hip pain and body pain has been chronic for years.  Per grandchildren, patient was also having slurred speech and her face was sagging at nursing home.  No other known weakness or deficits in all this seems to have resolved.  Patient has had hypotension in the ED.  Blood work in the ED significant for hemoglobin 10.7, sodium 128, creatinine 3.5, GFR 12.  Chest x-ray showed no acute abnormality.  CT head showed no acute abnormality.  Patient was given 2 fluid boluses.  Will admit patient for dehydration, TIANA, hyponatremia, CVA workup.     Assessment/Plan:       TIANA - with elevated BUN/Cr ratio, likely secondary to pre-renal azotemia.  Volume expansion given. Nephrology following. Holding Lasix, Jardiance, Losartan and Nifedipine. Continue IVF hydration. Monitor BMP.     Hyponatremia  - Likely secondary to decreased volume status from decreased p.o. intake  - Urine sodium, urine osmolality, serum osmolality pending  - Nephrology following     Hypotension  - Likely secondary to decreased volume status from decreased p.o. intake  - Continue IV fluids as above.   - Hold home BP meds     Headache  - Resolved  - Likely secondary to dehydration     Dysarthria  Facial droop  - Resolved upon

## 2025-05-22 NOTE — PROGRESS NOTES
Patient is alert and oriented. Vitals are within normal limits and can be seen in vitals flow sheet. Patient has no complaints at this time. Patient is currently in bed at the lowest position with call light within reach. Care is ongoing.

## 2025-05-22 NOTE — CARE COORDINATION
Case Management Assessment  Initial Evaluation    Date/Time of Evaluation: 5/22/2025 2:03 PM  Assessment Completed by: ISSA ROMAN RN    If patient is discharged prior to next notation, then this note serves as note for discharge by case management.    Patient Name: Courtney Taylor                   YOB: 1940  Diagnosis: Dehydration [E86.0]  Acute hyponatremia [E87.1]  Dysarthria [R47.1]  Other chronic pain [G89.29]  TIANA (acute kidney injury) [N17.9]  Acute kidney injury [N17.9]  Nonintractable headache, unspecified chronicity pattern, unspecified headache type [R51.9]                   Date / Time: 5/21/2025  9:01 PM    Patient Admission Status: Inpatient   Readmission Risk (Low < 19, Mod (19-27), High > 27): Readmission Risk Score: 18.3    Current PCP: Noa Cardoza PA  PCP verified by CM? Yes    Chart Reviewed: Yes      History Provided by: Patient, Medical Record  Patient Orientation: Alert and Oriented    Patient Cognition: Alert    Hospitalization in the last 30 days (Readmission):  Yes    If yes, Readmission Assessment in CM Navigator will be completed.    Advance Directives:      Code Status: Full Code   Patient's Primary Decision Maker is: Legal Next of Kin    Primary Decision Maker: HoffmanThalia - Child - [phone]    Secondary Decision Maker: Darren Taylor - Child - [phone]    Discharge Planning:    Patient lives with: Alone Type of Home: Apartment  Primary Care Giver: Other (Comment) (SNF at this time)  Patient Support Systems include: Children, Family Members   Current Financial resources: Medicare  Current community resources: ECF/Home Care  Current services prior to admission: Skilled Nursing Facility            Current DME:              Type of Home Care services:  None    ADLS  Prior functional level: Assistance with the following:, Housework, Shopping, Cooking (prior to SNF admit)  Current functional level: Assistance with the following:, Bathing, Dressing,

## 2025-05-22 NOTE — PLAN OF CARE
SLP completed evaluation. Please refer to notes in EMR.      Agnieszka Segundo MA CCC-SLP SP#[id number]  Speech-Language Pathologist

## 2025-05-22 NOTE — ED NOTES
Courtney aTylor is a 85 y.o. female admitted for  Principal Problem:    Acute kidney injury  Resolved Problems:    * No resolved hospital problems. *  .   Patient Assisted Living via EMS transportation with   Chief Complaint   Patient presents with    Headache     X2 days   .  Patient is alert and Person, Place, Time, and Situation  Patient's baseline mobility:   Code Status: Prior   Cardiac Rhythm:    O2 Flow Rate (L/min): 2 L/min  Is patient on baseline Oxygen: no how many Liters:   Abnormal Assessment Findings:     Isolation: NONE      NIH Score:    C-SSRS: Risk of Suicide: No Risk  Bedside swallow:        Active LDA's:   Peripheral IV 05/21/25 Left;Proximal;Anterior Forearm (Active)   Site Assessment Clean, dry & intact 05/21/25 2116   Line Status Blood return noted;Brisk blood return;Flushed;Specimen collected;Normal saline locked 05/21/25 2116   Dressing Status New dressing applied;Clean, dry & intact 05/21/25 2116   Dressing Type Transparent 05/21/25 2116   Dressing Intervention New 05/21/25 2116       Peripheral IV 05/21/25 Left;Posterior Hand (Active)     Patient admitted with a granda: no If the granda is chronic was it exchanged:  Reason for granda:   Patient admitted with Central Line:  . PICC line placement confirmed: YES OR NO:[id number]}   Reason for Central line:   Was central line Inserted from an outside facility:        Family/Caregiver Present yes Any Concerns:   Restraints   Sitter          Vitals:      Vitals:    05/21/25 2303 05/21/25 2332 05/22/25 0019 05/22/25 0029   BP: (!) 93/41 (!) 113/45 (!) 108/38 (!) 115/39   Pulse: 64 65 65 71   Resp: 16 11 16 18   Temp:       SpO2: 96% 98% 96% 96%   Weight:       Height:           Last documented pain score (0-10 scale) Pain Level: 8  Pain medication administered No.    Pertinent or High Risk Medications/Drips: No.    Pending Blood Product Administration: no    Abnormal labs:   Abnormal Labs Reviewed   CBC WITH AUTO DIFFERENTIAL - Abnormal; Notable for the

## 2025-05-23 LAB
ALBUMIN SERPL-MCNC: 3.1 G/DL (ref 3.4–5)
ALBUMIN/GLOB SERPL: 1.1 {RATIO} (ref 1.1–2.2)
ALP SERPL-CCNC: 97 U/L (ref 40–129)
ALT SERPL-CCNC: 14 U/L (ref 10–40)
ANION GAP SERPL CALCULATED.3IONS-SCNC: 10 MMOL/L (ref 3–16)
AST SERPL-CCNC: 28 U/L (ref 15–37)
BACTERIA UR CULT: ABNORMAL
BASOPHILS # BLD: 0 K/UL (ref 0–0.2)
BASOPHILS NFR BLD: 0.7 %
BILIRUB SERPL-MCNC: 0.3 MG/DL (ref 0–1)
BUN SERPL-MCNC: 27 MG/DL (ref 7–20)
CALCIUM SERPL-MCNC: 8.4 MG/DL (ref 8.3–10.6)
CHLORIDE SERPL-SCNC: 109 MMOL/L (ref 99–110)
CO2 SERPL-SCNC: 23 MMOL/L (ref 21–32)
CREAT SERPL-MCNC: 1 MG/DL (ref 0.6–1.2)
DEPRECATED RDW RBC AUTO: 14.1 % (ref 12.4–15.4)
EOSINOPHIL # BLD: 0.2 K/UL (ref 0–0.6)
EOSINOPHIL NFR BLD: 2.4 %
GFR SERPLBLD CREATININE-BSD FMLA CKD-EPI: 56 ML/MIN/{1.73_M2}
GLUCOSE BLD-MCNC: 110 MG/DL (ref 70–99)
GLUCOSE BLD-MCNC: 117 MG/DL (ref 70–99)
GLUCOSE BLD-MCNC: 135 MG/DL (ref 70–99)
GLUCOSE BLD-MCNC: 142 MG/DL (ref 70–99)
GLUCOSE SERPL-MCNC: 114 MG/DL (ref 70–99)
HCT VFR BLD AUTO: 32.8 % (ref 36–48)
HGB BLD-MCNC: 11 G/DL (ref 12–16)
LYMPHOCYTES # BLD: 0.9 K/UL (ref 1–5.1)
LYMPHOCYTES NFR BLD: 13.9 %
MCH RBC QN AUTO: 31.3 PG (ref 26–34)
MCHC RBC AUTO-ENTMCNC: 33.6 G/DL (ref 31–36)
MCV RBC AUTO: 93.1 FL (ref 80–100)
MONOCYTES # BLD: 0.9 K/UL (ref 0–1.3)
MONOCYTES NFR BLD: 14.3 %
NEUTROPHILS # BLD: 4.5 K/UL (ref 1.7–7.7)
NEUTROPHILS NFR BLD: 68.7 %
ORGANISM: ABNORMAL
PERFORMED ON: ABNORMAL
PLATELET # BLD AUTO: 341 K/UL (ref 135–450)
PMV BLD AUTO: 7.1 FL (ref 5–10.5)
POTASSIUM SERPL-SCNC: 3.9 MMOL/L (ref 3.5–5.1)
PROT SERPL-MCNC: 5.8 G/DL (ref 6.4–8.2)
RBC # BLD AUTO: 3.52 M/UL (ref 4–5.2)
SODIUM SERPL-SCNC: 142 MMOL/L (ref 136–145)
WBC # BLD AUTO: 6.6 K/UL (ref 4–11)

## 2025-05-23 PROCEDURE — 2580000003 HC RX 258

## 2025-05-23 PROCEDURE — 80053 COMPREHEN METABOLIC PANEL: CPT

## 2025-05-23 PROCEDURE — 6360000002 HC RX W HCPCS: Performed by: INTERNAL MEDICINE

## 2025-05-23 PROCEDURE — 2500000003 HC RX 250 WO HCPCS: Performed by: NURSE PRACTITIONER

## 2025-05-23 PROCEDURE — 94640 AIRWAY INHALATION TREATMENT: CPT

## 2025-05-23 PROCEDURE — 6370000000 HC RX 637 (ALT 250 FOR IP): Performed by: INTERNAL MEDICINE

## 2025-05-23 PROCEDURE — 2500000003 HC RX 250 WO HCPCS

## 2025-05-23 PROCEDURE — 6360000002 HC RX W HCPCS

## 2025-05-23 PROCEDURE — 6370000000 HC RX 637 (ALT 250 FOR IP)

## 2025-05-23 PROCEDURE — 2580000003 HC RX 258: Performed by: INTERNAL MEDICINE

## 2025-05-23 PROCEDURE — 85025 COMPLETE CBC W/AUTO DIFF WBC: CPT

## 2025-05-23 PROCEDURE — 1200000000 HC SEMI PRIVATE

## 2025-05-23 RX ORDER — METOPROLOL TARTRATE 25 MG/1
25 TABLET, FILM COATED ORAL DAILY
Status: DISCONTINUED | OUTPATIENT
Start: 2025-05-23 | End: 2025-05-27

## 2025-05-23 RX ORDER — NIFEDIPINE 30 MG/1
30 TABLET, EXTENDED RELEASE ORAL DAILY
Status: DISCONTINUED | OUTPATIENT
Start: 2025-05-23 | End: 2025-05-30 | Stop reason: HOSPADM

## 2025-05-23 RX ORDER — METOPROLOL TARTRATE 1 MG/ML
5 INJECTION, SOLUTION INTRAVENOUS ONCE
Status: COMPLETED | OUTPATIENT
Start: 2025-05-23 | End: 2025-05-23

## 2025-05-23 RX ADMIN — MICONAZOLE NITRATE: 2 POWDER TOPICAL at 09:13

## 2025-05-23 RX ADMIN — METOPROLOL TARTRATE 25 MG: 25 TABLET, FILM COATED ORAL at 14:52

## 2025-05-23 RX ADMIN — SODIUM CHLORIDE: 0.9 INJECTION, SOLUTION INTRAVENOUS at 20:05

## 2025-05-23 RX ADMIN — ATORVASTATIN CALCIUM 40 MG: 40 TABLET, FILM COATED ORAL at 09:13

## 2025-05-23 RX ADMIN — NIFEDIPINE 30 MG: 30 TABLET, FILM COATED, EXTENDED RELEASE ORAL at 14:52

## 2025-05-23 RX ADMIN — ASPIRIN 81 MG: 81 TABLET, COATED ORAL at 09:13

## 2025-05-23 RX ADMIN — IPRATROPIUM BROMIDE AND ALBUTEROL SULFATE 1 DOSE: 2.5; .5 SOLUTION RESPIRATORY (INHALATION) at 12:00

## 2025-05-23 RX ADMIN — PANTOPRAZOLE SODIUM 40 MG: 40 TABLET, DELAYED RELEASE ORAL at 06:24

## 2025-05-23 RX ADMIN — ACETAMINOPHEN 650 MG: 325 TABLET ORAL at 20:07

## 2025-05-23 RX ADMIN — IPRATROPIUM BROMIDE AND ALBUTEROL SULFATE 1 DOSE: 2.5; .5 SOLUTION RESPIRATORY (INHALATION) at 20:20

## 2025-05-23 RX ADMIN — ACETAMINOPHEN 650 MG: 325 TABLET ORAL at 09:12

## 2025-05-23 RX ADMIN — SODIUM CHLORIDE, PRESERVATIVE FREE 10 ML: 5 INJECTION INTRAVENOUS at 09:13

## 2025-05-23 RX ADMIN — MEMANTINE 5 MG: 5 TABLET ORAL at 09:13

## 2025-05-23 RX ADMIN — METHOCARBAMOL 750 MG: 750 TABLET ORAL at 20:09

## 2025-05-23 RX ADMIN — HEPARIN SODIUM 5000 UNITS: 5000 INJECTION INTRAVENOUS; SUBCUTANEOUS at 06:24

## 2025-05-23 RX ADMIN — METHOCARBAMOL 750 MG: 750 TABLET ORAL at 09:13

## 2025-05-23 RX ADMIN — MICONAZOLE NITRATE: 2 POWDER TOPICAL at 20:13

## 2025-05-23 RX ADMIN — LEVOTHYROXINE SODIUM 125 MCG: 0.12 TABLET ORAL at 06:24

## 2025-05-23 RX ADMIN — IPRATROPIUM BROMIDE AND ALBUTEROL SULFATE 1 DOSE: 2.5; .5 SOLUTION RESPIRATORY (INHALATION) at 07:47

## 2025-05-23 RX ADMIN — CITALOPRAM HYDROBROMIDE 20 MG: 20 TABLET ORAL at 09:13

## 2025-05-23 RX ADMIN — METOPROLOL TARTRATE 5 MG: 1 INJECTION, SOLUTION INTRAVENOUS at 21:27

## 2025-05-23 RX ADMIN — SODIUM CHLORIDE, PRESERVATIVE FREE 10 ML: 5 INJECTION INTRAVENOUS at 20:00

## 2025-05-23 RX ADMIN — HEPARIN SODIUM 5000 UNITS: 5000 INJECTION INTRAVENOUS; SUBCUTANEOUS at 13:35

## 2025-05-23 RX ADMIN — IPRATROPIUM BROMIDE AND ALBUTEROL SULFATE 1 DOSE: 2.5; .5 SOLUTION RESPIRATORY (INHALATION) at 15:21

## 2025-05-23 RX ADMIN — HEPARIN SODIUM 5000 UNITS: 5000 INJECTION INTRAVENOUS; SUBCUTANEOUS at 21:27

## 2025-05-23 RX ADMIN — MEROPENEM 1000 MG: 1 INJECTION INTRAVENOUS at 20:06

## 2025-05-23 RX ADMIN — GABAPENTIN 300 MG: 300 CAPSULE ORAL at 09:13

## 2025-05-23 RX ADMIN — DONEPEZIL HYDROCHLORIDE 10 MG: 5 TABLET, FILM COATED ORAL at 20:09

## 2025-05-23 ASSESSMENT — PAIN DESCRIPTION - DESCRIPTORS
DESCRIPTORS: THROBBING
DESCRIPTORS: ACHING

## 2025-05-23 ASSESSMENT — PAIN DESCRIPTION - LOCATION
LOCATION: HEAD
LOCATION: HEAD

## 2025-05-23 ASSESSMENT — PAIN SCALES - GENERAL
PAINLEVEL_OUTOF10: 8
PAINLEVEL_OUTOF10: 2

## 2025-05-23 NOTE — PROGRESS NOTES
[facility] Medicine Progress Note  V 5.1      Date of Admission: 5/21/2025    Hospital Day: 3      Chief Admission Complaint:  Headache    Subjective:  She is drowsy this morning. RN reported she only at small portion of breakfast. Appears more comfortable.     ADDENDUM: UCx resulted this afternoon positive for MDR E.coli    Presenting Admission History:       85 y.o. female who presented to [facility] with headache.  PMHx significant for diabetes, previous TIANA/renal failure, chronic back and left hip pain, hard of hearing, fibromyalgia, anxiety, thyroid cancer, diabetes.  She reports headache for 2 days with bodyaches.  No fevers or chills.  Family states that patient has had renal injury and hyponatremia in the past.  Patient lives at a nursing home (possibly [facility]) and family is unaware of her p.o. intake.  Patient's left hip pain and body pain has been chronic for years.  Per grandchildren, patient was also having slurred speech and her face was sagging at nursing home.  No other known weakness or deficits in all this seems to have resolved.  Patient has had hypotension in the ED.  Blood work in the ED significant for hemoglobin 10.7, sodium 128, creatinine 3.5, GFR 12.  Chest x-ray showed no acute abnormality.  CT head showed no acute abnormality.  Patient was given 2 fluid boluses.  Will admit patient for dehydration, TIANA, hyponatremia, CVA workup.     Assessment/Plan:       TIANA - with elevated BUN/Cr ratio, likely secondary to pre-renal azotemia.  Volume expansion given. Nephrology following. Holding Lasix, Jardiance, Losartan and Nifedipine. Will trial off IVF hydration. Monitor BMP.     Hyponatremia  - Likely secondary to decreased volume status from decreased p.o. intake  - Urine sodium, urine osmolality, serum osmolality pending  - Nephrology following     Acute Cystitis: Admission UA showed moderate pyuria. Her UCx finalized 5/23 PM and showed MDR E.coli. She  insight    BP (!) 187/62   Pulse 94   Temp 98.8 °F (37.1 °C) (Oral)   Resp 16   Ht 1.524 m (5')   Wt 102.3 kg (225 lb 8.5 oz)   SpO2 94%   BMI 44.05 kg/m²     Telemetry:      Personally reviewed and interpreted telemetry (Rhythm Strip) on 5/23/2025.  Patient is currently ON tele demonstrating SR with HR 90s .    Diet: ADULT DIET; Regular; 4 carb choices (60 gm/meal)    DVT Prophylaxis: SQ Heparin    Code status: Full Code    PT/OT Eval Status: Seen w/ recs for SNF    Multi-Disciplinary Rounds with Case Management completed on 5/23/2025 with the following recs:     Anticipated Discharge Location: [facility]     Anticipated Discharge Day/Date:  1-2 days    Barriers to Discharge: Clinical Course and Subspecialty recs    Likely rate limiting factor: TIANA has improved but her PO intake remains limited today. Will monitor 24H for stability of renal function off fluids and with PO intake prior to returning to [facility].     --------------------------------------------------    MDM (any 2 required for High level billing)    A. Problems (any 1)  [x] Acute/Chronic Illness/injury posing ongoing threat to life and/or bodily function without ongoing treatment    [] Severe exacerbation of chronic illness    --------------------------------------------------  B. Risk of Treatment (any 1)    [] Drugs/treatments that require intensive monitoring for toxicity    [] IV ABX (Vancomycin, Aminoglycosides, etc)     [] Post-Cath/Contrast study requiring serial monitoring    [] IV Narcotic analgesia    [] Aggressive IV diuresis    [] Hypertonic Saline    [] Critical electrolyte abnormalities requiring IV replacement    [] Insulin - Scheduled/SSI or Insulin gtt    [] Anticoagulation (Heparin gtt or Coumadin - other anticoagulants in special circumstances)    [] Cardiac Medications (IV Amiodarone/Diltiazem, Tikosyn, etc)    [] Hemodialysis    [] Other -    [] Change in code status    [] Decision to escalate care    [] Major

## 2025-05-23 NOTE — PROGRESS NOTES
[facility] Progress Note           Subjective/   85 y.o. year old female who we are seeing in consultation for TIANA.     HPI:  Renal function improving with IVF's, non-oliguric.  Denies any shortness of breath, on RA.    ROS:  +weak, intake reduced.    Objective/   GEN:  Chronically ill, BP (!) 187/62   Pulse 94   Temp 98.8 °F (37.1 °C) (Oral)   Resp 16   Ht 1.524 m (5')   Wt 102.3 kg (225 lb 8.5 oz)   SpO2 94%   BMI 44.05 kg/m²   HEENT: non-icteric, no JVD  CV: S1, S2 without m/r/g; no edema  RESP: CTA B without w/r/r; breathing wnl  ABD: +bs, soft, nt, no hsm  SKIN: warm, no rashes    Data/  Recent Labs     05/21/25 2131 05/22/25  0759 05/23/25  0510   WBC 8.8 8.7 6.6   HGB 10.7* 11.3* 11.0*   HCT 32.4* 33.7* 32.8*   MCV 92.0 92.5 93.1    370 341     Recent Labs     05/21/25 2131 05/22/25  0759 05/23/25  0510   * 137 142   K 3.7 3.8 3.9   CL 92* 98* 109   CO2 24 24 23   GLUCOSE 153* 117* 114*   BUN 49* 42* 27*   CREATININE 3.5* 2.3* 1.0   LABGLOM 12* 20* 56*       Assessment/     - Acute kidney injury - pre-renal/renal hypoperfusion injury   Peak SCr 3.5 on admission, resolved with IVF's, non-oliguric     - Hyponatremia    - Hypertension       Plan/     - Trial off IVF's today, encouraged oral intake  - Lasix, jardiance, & losartan on hold  - Resume nifedepine and metoprolol home doses today  - Trend labs, bp's, weights, & urine output     ____________________________________  Butch Barakat MD  [facility]  [email or web address]  Office: [phone]

## 2025-05-23 NOTE — CARE COORDINATION
Chart reviewed day 2. Pt is followed by IM and Nephro. Poor PO intake today. Cert placed and pending to return to [facility]. Will follow for needs as they arise. Electronically signed by ISSA ROMAN RN on 5/23/2025 at 3:52 PM

## 2025-05-23 NOTE — ACP (ADVANCE CARE PLANNING)
Advance Care Planning     Advance Care Planning Inpatient Note  Spiritual Care Department    Today's Date: 5/23/2025  Unit: [facility] C5 - MED SURG/ORTHO    Received request from IDT Member.  Upon review of chart and communication with care team, Spiritual Care will defer advance care planning with patient at this time.. Patient was/were present in the room during visit.    Goals of ACP Conversation:  Discuss advance care planning documents  Facilitate a discussion related to patient's goals of care as they align with the patient's values and beliefs.    Health Care Decision Makers:       Primary Decision Maker: Thalia Hoffman - Child - [phone]    Secondary Decision Maker: Darren Taylor - Jorje - [phone]  Summary:  No Decision Maker named by patient at this time    Advance Care Planning Documents (Patient Wishes):  None     Assessment:  Attempted ACP conversation with Courtney. She was resting peacefully.    Interventions:  ACP Conversation postponed.   Courtney was resting peacefully and not fully alert.    Care Preferences Communicated:   No    Outcomes/Plan:  ACP Discussion: Postponed    Electronically signed by Chaplain Maricarmen on 5/23/2025 at 3:20 PM

## 2025-05-23 NOTE — PLAN OF CARE
Problem: Chronic Conditions and Co-morbidities  Goal: Patient's chronic conditions and co-morbidity symptoms are monitored and maintained or improved  5/22/2025 2217 by Mary Avalos RN  Outcome: Progressing     Problem: Discharge Planning  Goal: Discharge to home or other facility with appropriate resources  5/22/2025 2217 by Mary Avalos RN  Outcome: Progressing     Problem: Pain  Goal: Verbalizes/displays adequate comfort level or baseline comfort level  5/22/2025 2217 by Mary Avalos RN  Outcome: Progressing  Pt scoring pain on 0-10 scale. Pain medications given per MAR. Pt instructed to call out when pain level increasing. Call light within reach.    Problem: Skin/Tissue Integrity  Goal: Skin integrity remains intact  Description: 1.  Monitor for areas of redness and/or skin breakdown2.  Assess vascular access sites hourly3.  Every 4-6 hours minimum:  Change oxygen saturation probe site4.  Every 4-6 hours:  If on nasal continuous positive airway pressure, respiratory therapy assess nares and determine need for appliance change or resting period  5/22/2025 2217 by Mary Avalos RN  Outcome: Progressing     Problem: ABCDS Injury Assessment  Goal: Absence of physical injury  5/22/2025 2217 by Mary Avalos RN  Outcome: Progressing     Problem: Safety - Adult  Goal: Free from fall injury  5/22/2025 2217 by Mary Avalos RN  Outcome: Progressing  Bed in lowest position, wheels locked, 2/4 side rails up, nonskid footwear on. Bed/ chair check alarm in place, call light within reach. Pt instructed to call out when needing assistance. Pt stated understanding.

## 2025-05-23 NOTE — PLAN OF CARE
Problem: Chronic Conditions and Co-morbidities  Goal: Patient's chronic conditions and co-morbidity symptoms are monitored and maintained or improved  Outcome: Progressing     Problem: Discharge Planning  Goal: Discharge to home or other facility with appropriate resources  Outcome: Progressing     Problem: Pain  Goal: Verbalizes/displays adequate comfort level or baseline comfort level  Outcome: Progressing  Flowsheets (Taken 5/22/2025 0210 by Destini Parker RN)  Verbalizes/displays adequate comfort level or baseline comfort level:   Encourage patient to monitor pain and request assistance   Assess pain using appropriate pain scale   Administer analgesics based on type and severity of pain and evaluate response   Implement non-pharmacological measures as appropriate and evaluate response   Consider cultural and social influences on pain and pain management   Notify Licensed Independent Practitioner if interventions unsuccessful or patient reports new pain     Problem: Skin/Tissue Integrity  Goal: Skin integrity remains intact  Description: 1.  Monitor for areas of redness and/or skin breakdown2.  Assess vascular access sites hourly3.  Every 4-6 hours minimum:  Change oxygen saturation probe site4.  Every 4-6 hours:  If on nasal continuous positive airway pressure, respiratory therapy assess nares and determine need for appliance change or resting period  Outcome: Progressing     Problem: ABCDS Injury Assessment  Goal: Absence of physical injury  Outcome: Progressing     Problem: Safety - Adult  Goal: Free from fall injury  Outcome: Progressing

## 2025-05-24 LAB
ALBUMIN SERPL-MCNC: 3.1 G/DL (ref 3.4–5)
ALBUMIN/GLOB SERPL: 1.1 {RATIO} (ref 1.1–2.2)
ALP SERPL-CCNC: 106 U/L (ref 40–129)
ALT SERPL-CCNC: 15 U/L (ref 10–40)
ANION GAP SERPL CALCULATED.3IONS-SCNC: 9 MMOL/L (ref 3–16)
AST SERPL-CCNC: 27 U/L (ref 15–37)
BASOPHILS # BLD: 0.1 K/UL (ref 0–0.2)
BASOPHILS NFR BLD: 0.8 %
BILIRUB SERPL-MCNC: 0.3 MG/DL (ref 0–1)
BILIRUB UR QL STRIP.AUTO: NEGATIVE
BUN SERPL-MCNC: 15 MG/DL (ref 7–20)
CALCIUM SERPL-MCNC: 8.9 MG/DL (ref 8.3–10.6)
CHARACTER UR: ABNORMAL
CHLORIDE SERPL-SCNC: 108 MMOL/L (ref 99–110)
CLARITY UR: ABNORMAL
CO2 SERPL-SCNC: 25 MMOL/L (ref 21–32)
COLOR UR: YELLOW
CREAT SERPL-MCNC: 0.7 MG/DL (ref 0.6–1.2)
DEPRECATED RDW RBC AUTO: 14.3 % (ref 12.4–15.4)
EOSINOPHIL # BLD: 0.1 K/UL (ref 0–0.6)
EOSINOPHIL NFR BLD: 1.5 %
GFR SERPLBLD CREATININE-BSD FMLA CKD-EPI: 85 ML/MIN/{1.73_M2}
GLUCOSE BLD-MCNC: 112 MG/DL (ref 70–99)
GLUCOSE BLD-MCNC: 118 MG/DL (ref 70–99)
GLUCOSE BLD-MCNC: 123 MG/DL (ref 70–99)
GLUCOSE BLD-MCNC: 129 MG/DL (ref 70–99)
GLUCOSE SERPL-MCNC: 116 MG/DL (ref 70–99)
GLUCOSE UR STRIP.AUTO-MCNC: 250 MG/DL
HCT VFR BLD AUTO: 32.9 % (ref 36–48)
HGB BLD-MCNC: 11.1 G/DL (ref 12–16)
HGB UR QL STRIP.AUTO: ABNORMAL
KETONES UR STRIP.AUTO-MCNC: NEGATIVE MG/DL
LEUKOCYTE ESTERASE UR QL STRIP.AUTO: ABNORMAL
LYMPHOCYTES # BLD: 1 K/UL (ref 1–5.1)
LYMPHOCYTES NFR BLD: 13.6 %
MCH RBC QN AUTO: 31 PG (ref 26–34)
MCHC RBC AUTO-ENTMCNC: 33.7 G/DL (ref 31–36)
MCV RBC AUTO: 92.1 FL (ref 80–100)
MONOCYTES # BLD: 0.9 K/UL (ref 0–1.3)
MONOCYTES NFR BLD: 11.4 %
NEUTROPHILS # BLD: 5.5 K/UL (ref 1.7–7.7)
NEUTROPHILS NFR BLD: 72.7 %
NITRITE UR QL STRIP.AUTO: NEGATIVE
PERFORMED ON: ABNORMAL
PH UR STRIP.AUTO: 6 [PH] (ref 5–8)
PLATELET # BLD AUTO: 380 K/UL (ref 135–450)
PMV BLD AUTO: 7 FL (ref 5–10.5)
POTASSIUM SERPL-SCNC: 3.9 MMOL/L (ref 3.5–5.1)
PROT SERPL-MCNC: 5.8 G/DL (ref 6.4–8.2)
PROT UR STRIP.AUTO-MCNC: 100 MG/DL
RBC # BLD AUTO: 3.57 M/UL (ref 4–5.2)
RBC #/AREA URNS HPF: ABNORMAL /HPF (ref 0–4)
SODIUM SERPL-SCNC: 142 MMOL/L (ref 136–145)
SP GR UR STRIP.AUTO: 1.02 (ref 1–1.03)
UA DIPSTICK W REFLEX MICRO PNL UR: YES
URN SPEC COLLECT METH UR: ABNORMAL
UROBILINOGEN UR STRIP-ACNC: 0.2 E.U./DL
WBC # BLD AUTO: 7.6 K/UL (ref 4–11)
WBC #/AREA URNS HPF: >100 /HPF (ref 0–5)

## 2025-05-24 PROCEDURE — 6360000002 HC RX W HCPCS: Performed by: INTERNAL MEDICINE

## 2025-05-24 PROCEDURE — 2580000003 HC RX 258: Performed by: INTERNAL MEDICINE

## 2025-05-24 PROCEDURE — 6370000000 HC RX 637 (ALT 250 FOR IP)

## 2025-05-24 PROCEDURE — 6370000000 HC RX 637 (ALT 250 FOR IP): Performed by: INTERNAL MEDICINE

## 2025-05-24 PROCEDURE — 6360000002 HC RX W HCPCS

## 2025-05-24 PROCEDURE — 94640 AIRWAY INHALATION TREATMENT: CPT

## 2025-05-24 PROCEDURE — 36415 COLL VENOUS BLD VENIPUNCTURE: CPT

## 2025-05-24 PROCEDURE — 6370000000 HC RX 637 (ALT 250 FOR IP): Performed by: NURSE PRACTITIONER

## 2025-05-24 PROCEDURE — 51701 INSERT BLADDER CATHETER: CPT

## 2025-05-24 PROCEDURE — 2500000003 HC RX 250 WO HCPCS

## 2025-05-24 PROCEDURE — 1200000000 HC SEMI PRIVATE

## 2025-05-24 PROCEDURE — 87086 URINE CULTURE/COLONY COUNT: CPT

## 2025-05-24 PROCEDURE — 80053 COMPREHEN METABOLIC PANEL: CPT

## 2025-05-24 PROCEDURE — 81001 URINALYSIS AUTO W/SCOPE: CPT

## 2025-05-24 PROCEDURE — 51798 US URINE CAPACITY MEASURE: CPT

## 2025-05-24 PROCEDURE — 85025 COMPLETE CBC W/AUTO DIFF WBC: CPT

## 2025-05-24 RX ORDER — NITROFURANTOIN 25; 75 MG/1; MG/1
100 CAPSULE ORAL EVERY 12 HOURS SCHEDULED
Status: DISCONTINUED | OUTPATIENT
Start: 2025-05-24 | End: 2025-05-30 | Stop reason: HOSPADM

## 2025-05-24 RX ORDER — OXYCODONE AND ACETAMINOPHEN 5; 325 MG/1; MG/1
1 TABLET ORAL EVERY 4 HOURS PRN
Refills: 0 | Status: DISCONTINUED | OUTPATIENT
Start: 2025-05-24 | End: 2025-05-30 | Stop reason: HOSPADM

## 2025-05-24 RX ORDER — SODIUM CHLORIDE 450 MG/100ML
INJECTION, SOLUTION INTRAVENOUS CONTINUOUS
Status: DISCONTINUED | OUTPATIENT
Start: 2025-05-24 | End: 2025-05-27

## 2025-05-24 RX ADMIN — ACETAMINOPHEN 650 MG: 325 TABLET ORAL at 13:09

## 2025-05-24 RX ADMIN — SODIUM CHLORIDE: 0.45 INJECTION, SOLUTION INTRAVENOUS at 16:25

## 2025-05-24 RX ADMIN — GABAPENTIN 300 MG: 300 CAPSULE ORAL at 08:28

## 2025-05-24 RX ADMIN — OXYCODONE AND ACETAMINOPHEN 1 TABLET: 5; 325 TABLET ORAL at 18:09

## 2025-05-24 RX ADMIN — HEPARIN SODIUM 5000 UNITS: 5000 INJECTION INTRAVENOUS; SUBCUTANEOUS at 13:08

## 2025-05-24 RX ADMIN — MICONAZOLE NITRATE: 2 POWDER TOPICAL at 08:29

## 2025-05-24 RX ADMIN — MEROPENEM 1000 MG: 1 INJECTION INTRAVENOUS at 08:32

## 2025-05-24 RX ADMIN — IPRATROPIUM BROMIDE AND ALBUTEROL SULFATE 1 DOSE: 2.5; .5 SOLUTION RESPIRATORY (INHALATION) at 07:12

## 2025-05-24 RX ADMIN — NIFEDIPINE 30 MG: 30 TABLET, FILM COATED, EXTENDED RELEASE ORAL at 08:28

## 2025-05-24 RX ADMIN — IPRATROPIUM BROMIDE AND ALBUTEROL SULFATE 1 DOSE: 2.5; .5 SOLUTION RESPIRATORY (INHALATION) at 19:14

## 2025-05-24 RX ADMIN — METHOCARBAMOL 750 MG: 750 TABLET ORAL at 08:28

## 2025-05-24 RX ADMIN — MEMANTINE 5 MG: 5 TABLET ORAL at 08:28

## 2025-05-24 RX ADMIN — IPRATROPIUM BROMIDE AND ALBUTEROL SULFATE 1 DOSE: 2.5; .5 SOLUTION RESPIRATORY (INHALATION) at 11:52

## 2025-05-24 RX ADMIN — ASPIRIN 81 MG: 81 TABLET, COATED ORAL at 08:28

## 2025-05-24 RX ADMIN — HEPARIN SODIUM 5000 UNITS: 5000 INJECTION INTRAVENOUS; SUBCUTANEOUS at 21:18

## 2025-05-24 RX ADMIN — ACETAMINOPHEN 650 MG: 325 TABLET ORAL at 06:18

## 2025-05-24 RX ADMIN — OXYCODONE AND ACETAMINOPHEN 1 TABLET: 5; 325 TABLET ORAL at 11:17

## 2025-05-24 RX ADMIN — METOPROLOL TARTRATE 25 MG: 25 TABLET, FILM COATED ORAL at 08:28

## 2025-05-24 RX ADMIN — OXYCODONE AND ACETAMINOPHEN 1 TABLET: 5; 325 TABLET ORAL at 22:21

## 2025-05-24 RX ADMIN — LEVOTHYROXINE SODIUM 125 MCG: 0.12 TABLET ORAL at 06:09

## 2025-05-24 RX ADMIN — DONEPEZIL HYDROCHLORIDE 10 MG: 5 TABLET, FILM COATED ORAL at 21:18

## 2025-05-24 RX ADMIN — METHOCARBAMOL 750 MG: 750 TABLET ORAL at 13:09

## 2025-05-24 RX ADMIN — ATORVASTATIN CALCIUM 40 MG: 40 TABLET, FILM COATED ORAL at 08:28

## 2025-05-24 RX ADMIN — PANTOPRAZOLE SODIUM 40 MG: 40 TABLET, DELAYED RELEASE ORAL at 06:09

## 2025-05-24 RX ADMIN — METHOCARBAMOL 750 MG: 750 TABLET ORAL at 21:18

## 2025-05-24 RX ADMIN — HEPARIN SODIUM 5000 UNITS: 5000 INJECTION INTRAVENOUS; SUBCUTANEOUS at 06:09

## 2025-05-24 RX ADMIN — SODIUM CHLORIDE, PRESERVATIVE FREE 10 ML: 5 INJECTION INTRAVENOUS at 08:28

## 2025-05-24 RX ADMIN — CITALOPRAM HYDROBROMIDE 20 MG: 20 TABLET ORAL at 08:28

## 2025-05-24 RX ADMIN — MICONAZOLE NITRATE: 2 POWDER TOPICAL at 21:06

## 2025-05-24 RX ADMIN — NITROFURANTOIN MONOHYDRATE/MACROCRYSTALLINE 100 MG: 25; 75 CAPSULE ORAL at 21:28

## 2025-05-24 ASSESSMENT — PAIN DESCRIPTION - ORIENTATION
ORIENTATION: RIGHT;LEFT
ORIENTATION: LEFT
ORIENTATION: LEFT
ORIENTATION: RIGHT;LEFT
ORIENTATION: RIGHT;LEFT
ORIENTATION: LEFT
ORIENTATION: LEFT;RIGHT

## 2025-05-24 ASSESSMENT — PAIN DESCRIPTION - FREQUENCY
FREQUENCY: INTERMITTENT
FREQUENCY: CONTINUOUS
FREQUENCY: CONTINUOUS
FREQUENCY: INTERMITTENT

## 2025-05-24 ASSESSMENT — PAIN DESCRIPTION - PAIN TYPE
TYPE: ACUTE PAIN;SURGICAL PAIN
TYPE: ACUTE PAIN
TYPE: SURGICAL PAIN;ACUTE PAIN
TYPE: ACUTE PAIN
TYPE: CHRONIC PAIN

## 2025-05-24 ASSESSMENT — PAIN SCALES - GENERAL
PAINLEVEL_OUTOF10: 6
PAINLEVEL_OUTOF10: 7
PAINLEVEL_OUTOF10: 2
PAINLEVEL_OUTOF10: 5
PAINLEVEL_OUTOF10: 5
PAINLEVEL_OUTOF10: 6

## 2025-05-24 ASSESSMENT — PAIN DESCRIPTION - ONSET
ONSET: GRADUAL
ONSET: ON-GOING
ONSET: ON-GOING
ONSET: GRADUAL

## 2025-05-24 ASSESSMENT — PAIN DESCRIPTION - DESCRIPTORS
DESCRIPTORS: ACHING
DESCRIPTORS: ACHING;DISCOMFORT
DESCRIPTORS: ACHING;DISCOMFORT
DESCRIPTORS: ACHING
DESCRIPTORS: ACHING

## 2025-05-24 ASSESSMENT — PAIN - FUNCTIONAL ASSESSMENT
PAIN_FUNCTIONAL_ASSESSMENT: PREVENTS OR INTERFERES SOME ACTIVE ACTIVITIES AND ADLS
PAIN_FUNCTIONAL_ASSESSMENT: PREVENTS OR INTERFERES SOME ACTIVE ACTIVITIES AND ADLS

## 2025-05-24 ASSESSMENT — PAIN DESCRIPTION - LOCATION
LOCATION: LEG;BACK
LOCATION: LEG
LOCATION: LEG;HIP
LOCATION: HIP
LOCATION: LEG;HIP;FOOT
LOCATION: FOOT;HIP;LEG
LOCATION: LEG;HIP

## 2025-05-24 ASSESSMENT — PAIN SCALES - WONG BAKER
WONGBAKER_NUMERICALRESPONSE: HURTS LITTLE MORE
WONGBAKER_NUMERICALRESPONSE: NO HURT
WONGBAKER_NUMERICALRESPONSE: HURTS A LITTLE BIT

## 2025-05-24 NOTE — PROGRESS NOTES
[facility] Medicine Progress Note  V 5.1      Date of Admission: 5/21/2025    Hospital Day: 4      Chief Admission Complaint:  Headache    Subjective:  Emr and notes reviewed pt in bed NAD, no complaints. She was uncertain how she got here and what happened to get her here. She is clear and pleasant       Presenting Admission History:       85 y.o. female who presented to [facility] with headache.  PMHx significant for diabetes, previous TIANA/renal failure, chronic back and left hip pain, hard of hearing, fibromyalgia, anxiety, thyroid cancer, diabetes.  She reports headache for 2 days with bodyaches.  No fevers or chills.  Family states that patient has had renal injury and hyponatremia in the past.  Patient lives at a nursing home (possibly [facility]) and family is unaware of her p.o. intake.  Patient's left hip pain and body pain has been chronic for years.  Per grandchildren, patient was also having slurred speech and her face was sagging at nursing home.  No other known weakness or deficits in all this seems to have resolved.  Patient has had hypotension in the ED.  Blood work in the ED significant for hemoglobin 10.7, sodium 128, creatinine 3.5, GFR 12.  Chest x-ray showed no acute abnormality.  CT head showed no acute abnormality.  Patient was given 2 fluid boluses.  Will admit patient for dehydration, TIANA, hyponatremia, CVA workup.     Assessment/Plan:       TIANA - with elevated BUN/Cr ratio, likely secondary to pre-renal azotemia.  Volume expansion given. Nephrology following. Holding Lasix, Jardiance, Losartan and Nifedipine. Will trial off IVF hydration. Monitor BMP.   - crt has normalized     Hyponatremia  - Likely secondary to decreased volume status from decreased p.o. intake  - Urine sodium, urine osmolality, serum osmolality pending  - Nephrology following  - 5/25 Na 142      Acute Cystitis: Admission UA showed moderate pyuria. Her UCx finalized 5/23 PM and showed MDR  Post-Cath/Contrast study requiring serial monitoring    [] IV Narcotic analgesia    [] Aggressive IV diuresis    [] Hypertonic Saline    [] Critical electrolyte abnormalities requiring IV replacement    [] Insulin - Scheduled/SSI or Insulin gtt    [] Anticoagulation (Heparin gtt or Coumadin - other anticoagulants in special circumstances)    [] Cardiac Medications (IV Amiodarone/Diltiazem, Tikosyn, etc)    [] Hemodialysis    [] Other -    [] Change in code status    [] Decision to escalate care    [] Major surgery/procedure with associated risk factors    --------------------------------------------------  C. Data (any 2)    [x] Data Review (any 3)    [x] Consultant notes from yesterday/today    [x] All available current labs reviewed interpreted for clinical significance    [x] Appropriate follow-up labs were ordered  [] Collateral history obtained     [x] Independent Interpretation of tests (any 1)    [x] Telemetry (Rhythm Strip) personally reviewed and interpreted        [] Imaging personally reviewed and interpreted     [x] Discussion (any 1)  [x] Multi-Disciplinary Rounds with Case Management  [] Discussed management of the case with           Labs:  Personally reviewed on 5/24/2025 and interpreted for clinical significance as documented above.     Recent Labs     05/22/25 0759 05/23/25  0510 05/24/25  0511   WBC 8.7 6.6 7.6   HGB 11.3* 11.0* 11.1*   HCT 33.7* 32.8* 32.9*    341 380     Recent Labs     05/22/25 0759 05/23/25  0510 05/24/25  0511    142 142   K 3.8 3.9 3.9   CL 98* 109 108   CO2 24 23 25   BUN 42* 27* 15   CREATININE 2.3* 1.0 0.7   CALCIUM 8.2* 8.4 8.9     No results for input(s): \"PROBNP\", \"TROPHS\" in the last 72 hours.  No results for input(s): \"LABA1C\" in the last 72 hours.  Recent Labs     05/22/25 0759 05/23/25  0510 05/24/25  0511   AST 36 28 27   ALT 16 14 15   BILITOT 0.3 0.3 0.3   ALKPHOS 96 97 106     No results for input(s): \"INR\", \"LACTA\", \"TSH\" in the last 72

## 2025-05-24 NOTE — PROGRESS NOTES
[facility] Progress Note           Subjective/   85 y.o. year old female who we are seeing in consultation for TIANA.     HPI:  Renal function improving with IVF's, now off, non-oliguric.  Denies any shortness of breath, on RA.    ROS:  +weak, intake reduced.    Objective/   GEN:  Chronically ill, BP (!) 161/79   Pulse 85   Temp 98.6 °F (37 °C) (Oral)   Resp 18   Ht 1.524 m (5')   Wt 102.3 kg (225 lb 8.5 oz)   SpO2 93%   BMI 44.05 kg/m²   HEENT: non-icteric, no JVD  CV: S1, S2 without m/r/g; no edema  RESP: CTA B without w/r/r; breathing wnl  ABD: +bs, soft, nt, no hsm  SKIN: warm, no rashes    Data/  Recent Labs     05/22/25  0759 05/23/25  0510 05/24/25  0511   WBC 8.7 6.6 7.6   HGB 11.3* 11.0* 11.1*   HCT 33.7* 32.8* 32.9*   MCV 92.5 93.1 92.1    341 380     Recent Labs     05/22/25  0759 05/23/25  0510 05/24/25  0511    142 142   K 3.8 3.9 3.9   CL 98* 109 108   CO2 24 23 25   GLUCOSE 117* 114* 116*   BUN 42* 27* 15   CREATININE 2.3* 1.0 0.7   LABGLOM 20* 56* 85       Assessment/     - Acute kidney injury - pre-renal/renal hypoperfusion injury in setting of UTI   Peak SCr 3.5 on admission, resolved with IVF's, non-oliguric     - Hypernatremia    - Hypertension       Plan/     - Resume maintenance IVF's with 1/2 NS while intake reduced  - Lasix, jardiance, & losartan on hold  - Resumed nifedepine and metoprolol home doses from 5/23  - Trend labs, bp's, weights, & urine output     ____________________________________  Butch Barakat MD  [facility]  [email or web address]  Office: [phone]

## 2025-05-24 NOTE — PLAN OF CARE
Problem: Chronic Conditions and Co-morbidities  Goal: Patient's chronic conditions and co-morbidity symptoms are monitored and maintained or improved  5/23/2025 2050 by Mary Avalos RN  Outcome: Progressing     Problem: Discharge Planning  Goal: Discharge to home or other facility with appropriate resources  5/23/2025 2050 by Mary Avalos RN  Outcome: Progressing     Problem: Pain  Goal: Verbalizes/displays adequate comfort level or baseline comfort level  5/23/2025 2050 by Mary Avalos RN  Outcome: Progressing  Pt scoring pain on 0-10 scale. Pain medications given per MAR. Pt instructed to call out when pain level increasing. Call light within reach.     Problem: Skin/Tissue Integrity  Goal: Skin integrity remains intact  Description: 1.  Monitor for areas of redness and/or skin breakdown2.  Assess vascular access sites hourly3.  Every 4-6 hours minimum:  Change oxygen saturation probe site4.  Every 4-6 hours:  If on nasal continuous positive airway pressure, respiratory therapy assess nares and determine need for appliance change or resting period  5/23/2025 2050 by Mary Avalos RN  Outcome: Progressing     Problem: ABCDS Injury Assessment  Goal: Absence of physical injury  5/23/2025 2050 by Mary Avalos RN  Outcome: Progressing     Problem: Safety - Adult  Goal: Free from fall injury  5/23/2025 2050 by Mary Avalos RN  Outcome: Progressing  Bed in lowest position, wheels locked, 2/4 side rails up, nonskid footwear on. Bed/ chair check alarm in place, call light within reach. Pt instructed to call out when needing assistance. Pt stated understanding.

## 2025-05-24 NOTE — PLAN OF CARE
Problem: Chronic Conditions and Co-morbidities  Goal: Patient's chronic conditions and co-morbidity symptoms are monitored and maintained or improved  Outcome: Progressing     Problem: Discharge Planning  Goal: Discharge to home or other facility with appropriate resources  Outcome: Progressing     Problem: Pain  Goal: Verbalizes/displays adequate comfort level or baseline comfort level  Outcome: Progressing  Flowsheets (Taken 5/24/2025 4565)  Verbalizes/displays adequate comfort level or baseline comfort level:   Encourage patient to monitor pain and request assistance   Assess pain using appropriate pain scale   Implement non-pharmacological measures as appropriate and evaluate response   Consider cultural and social influences on pain and pain management   Notify Licensed Independent Practitioner if interventions unsuccessful or patient reports new pain   Administer analgesics based on type and severity of pain and evaluate response     Problem: Skin/Tissue Integrity  Goal: Skin integrity remains intact  Description: 1.  Monitor for areas of redness and/or skin breakdown2.  Assess vascular access sites hourly3.  Every 4-6 hours minimum:  Change oxygen saturation probe site4.  Every 4-6 hours:  If on nasal continuous positive airway pressure, respiratory therapy assess nares and determine need for appliance change or resting period  Outcome: Progressing     Problem: ABCDS Injury Assessment  Goal: Absence of physical injury  Outcome: Progressing     Problem: Safety - Adult  Goal: Free from fall injury  Outcome: Progressing

## 2025-05-25 LAB
ANION GAP SERPL CALCULATED.3IONS-SCNC: 7 MMOL/L (ref 3–16)
BUN SERPL-MCNC: 7 MG/DL (ref 7–20)
CALCIUM SERPL-MCNC: 8.9 MG/DL (ref 8.3–10.6)
CHLORIDE SERPL-SCNC: 106 MMOL/L (ref 99–110)
CO2 SERPL-SCNC: 27 MMOL/L (ref 21–32)
CREAT SERPL-MCNC: 0.5 MG/DL (ref 0.6–1.2)
DEPRECATED RDW RBC AUTO: 14.1 % (ref 12.4–15.4)
GFR SERPLBLD CREATININE-BSD FMLA CKD-EPI: >90 ML/MIN/{1.73_M2}
GLUCOSE BLD-MCNC: 102 MG/DL (ref 70–99)
GLUCOSE BLD-MCNC: 114 MG/DL (ref 70–99)
GLUCOSE BLD-MCNC: 120 MG/DL (ref 70–99)
GLUCOSE BLD-MCNC: 148 MG/DL (ref 70–99)
GLUCOSE SERPL-MCNC: 129 MG/DL (ref 70–99)
HCT VFR BLD AUTO: 34.1 % (ref 36–48)
HGB BLD-MCNC: 11.5 G/DL (ref 12–16)
MCH RBC QN AUTO: 30.8 PG (ref 26–34)
MCHC RBC AUTO-ENTMCNC: 33.8 G/DL (ref 31–36)
MCV RBC AUTO: 91.2 FL (ref 80–100)
PERFORMED ON: ABNORMAL
PLATELET # BLD AUTO: 438 K/UL (ref 135–450)
PMV BLD AUTO: 6.8 FL (ref 5–10.5)
POTASSIUM SERPL-SCNC: 3.9 MMOL/L (ref 3.5–5.1)
RBC # BLD AUTO: 3.73 M/UL (ref 4–5.2)
SODIUM SERPL-SCNC: 140 MMOL/L (ref 136–145)
WBC # BLD AUTO: 10.4 K/UL (ref 4–11)

## 2025-05-25 PROCEDURE — 85027 COMPLETE CBC AUTOMATED: CPT

## 2025-05-25 PROCEDURE — 6370000000 HC RX 637 (ALT 250 FOR IP): Performed by: NURSE PRACTITIONER

## 2025-05-25 PROCEDURE — 51702 INSERT TEMP BLADDER CATH: CPT

## 2025-05-25 PROCEDURE — 6370000000 HC RX 637 (ALT 250 FOR IP)

## 2025-05-25 PROCEDURE — 80048 BASIC METABOLIC PNL TOTAL CA: CPT

## 2025-05-25 PROCEDURE — 1200000000 HC SEMI PRIVATE

## 2025-05-25 PROCEDURE — 36415 COLL VENOUS BLD VENIPUNCTURE: CPT

## 2025-05-25 PROCEDURE — 51798 US URINE CAPACITY MEASURE: CPT

## 2025-05-25 PROCEDURE — 6360000002 HC RX W HCPCS: Performed by: NURSE PRACTITIONER

## 2025-05-25 PROCEDURE — 2580000003 HC RX 258: Performed by: INTERNAL MEDICINE

## 2025-05-25 PROCEDURE — 6360000002 HC RX W HCPCS

## 2025-05-25 PROCEDURE — 2500000003 HC RX 250 WO HCPCS

## 2025-05-25 PROCEDURE — 6370000000 HC RX 637 (ALT 250 FOR IP): Performed by: INTERNAL MEDICINE

## 2025-05-25 PROCEDURE — 51701 INSERT BLADDER CATHETER: CPT

## 2025-05-25 RX ORDER — HYDRALAZINE HYDROCHLORIDE 20 MG/ML
10 INJECTION INTRAMUSCULAR; INTRAVENOUS EVERY 6 HOURS PRN
Status: DISCONTINUED | OUTPATIENT
Start: 2025-05-25 | End: 2025-05-30 | Stop reason: HOSPADM

## 2025-05-25 RX ORDER — IPRATROPIUM BROMIDE AND ALBUTEROL SULFATE 2.5; .5 MG/3ML; MG/3ML
1 SOLUTION RESPIRATORY (INHALATION) EVERY 4 HOURS PRN
Status: DISCONTINUED | OUTPATIENT
Start: 2025-05-25 | End: 2025-05-30 | Stop reason: HOSPADM

## 2025-05-25 RX ORDER — TAMSULOSIN HYDROCHLORIDE 0.4 MG/1
0.4 CAPSULE ORAL DAILY
Status: DISCONTINUED | OUTPATIENT
Start: 2025-05-25 | End: 2025-05-30 | Stop reason: HOSPADM

## 2025-05-25 RX ORDER — HYDROXYZINE PAMOATE 25 MG/1
25 CAPSULE ORAL 3 TIMES DAILY PRN
Status: DISCONTINUED | OUTPATIENT
Start: 2025-05-25 | End: 2025-05-30 | Stop reason: HOSPADM

## 2025-05-25 RX ADMIN — MEMANTINE 5 MG: 5 TABLET ORAL at 08:57

## 2025-05-25 RX ADMIN — NIFEDIPINE 30 MG: 30 TABLET, FILM COATED, EXTENDED RELEASE ORAL at 08:56

## 2025-05-25 RX ADMIN — OXYCODONE AND ACETAMINOPHEN 1 TABLET: 5; 325 TABLET ORAL at 04:23

## 2025-05-25 RX ADMIN — TAMSULOSIN HYDROCHLORIDE 0.4 MG: 0.4 CAPSULE ORAL at 11:35

## 2025-05-25 RX ADMIN — SODIUM CHLORIDE: 0.45 INJECTION, SOLUTION INTRAVENOUS at 05:29

## 2025-05-25 RX ADMIN — METHOCARBAMOL 750 MG: 750 TABLET ORAL at 08:57

## 2025-05-25 RX ADMIN — ACETAMINOPHEN 650 MG: 325 TABLET ORAL at 08:56

## 2025-05-25 RX ADMIN — CITALOPRAM HYDROBROMIDE 20 MG: 20 TABLET ORAL at 08:57

## 2025-05-25 RX ADMIN — ATORVASTATIN CALCIUM 40 MG: 40 TABLET, FILM COATED ORAL at 08:56

## 2025-05-25 RX ADMIN — METHOCARBAMOL 750 MG: 750 TABLET ORAL at 20:47

## 2025-05-25 RX ADMIN — MICONAZOLE NITRATE: 2 POWDER TOPICAL at 20:45

## 2025-05-25 RX ADMIN — PANTOPRAZOLE SODIUM 40 MG: 40 TABLET, DELAYED RELEASE ORAL at 05:28

## 2025-05-25 RX ADMIN — METOPROLOL TARTRATE 25 MG: 25 TABLET, FILM COATED ORAL at 08:56

## 2025-05-25 RX ADMIN — SODIUM CHLORIDE, PRESERVATIVE FREE 10 ML: 5 INJECTION INTRAVENOUS at 20:46

## 2025-05-25 RX ADMIN — SODIUM CHLORIDE, PRESERVATIVE FREE 10 ML: 5 INJECTION INTRAVENOUS at 08:57

## 2025-05-25 RX ADMIN — OXYCODONE AND ACETAMINOPHEN 1 TABLET: 5; 325 TABLET ORAL at 11:35

## 2025-05-25 RX ADMIN — DONEPEZIL HYDROCHLORIDE 10 MG: 5 TABLET, FILM COATED ORAL at 20:47

## 2025-05-25 RX ADMIN — HEPARIN SODIUM 5000 UNITS: 5000 INJECTION INTRAVENOUS; SUBCUTANEOUS at 04:24

## 2025-05-25 RX ADMIN — GABAPENTIN 300 MG: 300 CAPSULE ORAL at 08:57

## 2025-05-25 RX ADMIN — HEPARIN SODIUM 5000 UNITS: 5000 INJECTION INTRAVENOUS; SUBCUTANEOUS at 14:59

## 2025-05-25 RX ADMIN — HYDROXYZINE PAMOATE 25 MG: 25 CAPSULE ORAL at 20:46

## 2025-05-25 RX ADMIN — NITROFURANTOIN MONOHYDRATE/MACROCRYSTALLINE 100 MG: 25; 75 CAPSULE ORAL at 08:57

## 2025-05-25 RX ADMIN — OXYCODONE AND ACETAMINOPHEN 1 TABLET: 5; 325 TABLET ORAL at 16:47

## 2025-05-25 RX ADMIN — HYDROXYZINE PAMOATE 25 MG: 25 CAPSULE ORAL at 12:08

## 2025-05-25 RX ADMIN — HEPARIN SODIUM 5000 UNITS: 5000 INJECTION INTRAVENOUS; SUBCUTANEOUS at 20:46

## 2025-05-25 RX ADMIN — METHOCARBAMOL 750 MG: 750 TABLET ORAL at 15:00

## 2025-05-25 RX ADMIN — OXYCODONE AND ACETAMINOPHEN 1 TABLET: 5; 325 TABLET ORAL at 20:46

## 2025-05-25 RX ADMIN — NITROFURANTOIN MONOHYDRATE/MACROCRYSTALLINE 100 MG: 25; 75 CAPSULE ORAL at 20:54

## 2025-05-25 RX ADMIN — MICONAZOLE NITRATE: 2 POWDER TOPICAL at 08:57

## 2025-05-25 RX ADMIN — LOSARTAN POTASSIUM 50 MG: 25 TABLET, FILM COATED ORAL at 08:56

## 2025-05-25 RX ADMIN — LEVOTHYROXINE SODIUM 125 MCG: 0.12 TABLET ORAL at 05:28

## 2025-05-25 RX ADMIN — ASPIRIN 81 MG: 81 TABLET, COATED ORAL at 08:56

## 2025-05-25 RX ADMIN — HYDRALAZINE HYDROCHLORIDE 10 MG: 20 INJECTION INTRAMUSCULAR; INTRAVENOUS at 12:07

## 2025-05-25 ASSESSMENT — PAIN DESCRIPTION - DESCRIPTORS
DESCRIPTORS: ACHING
DESCRIPTORS: ACHING
DESCRIPTORS: ACHING;DISCOMFORT
DESCRIPTORS: ACHING
DESCRIPTORS: ACHING

## 2025-05-25 ASSESSMENT — PAIN DESCRIPTION - PAIN TYPE
TYPE: CHRONIC PAIN;ACUTE PAIN
TYPE: ACUTE PAIN
TYPE: CHRONIC PAIN
TYPE: ACUTE PAIN

## 2025-05-25 ASSESSMENT — PAIN SCALES - GENERAL
PAINLEVEL_OUTOF10: 6
PAINLEVEL_OUTOF10: 6
PAINLEVEL_OUTOF10: 0
PAINLEVEL_OUTOF10: 3
PAINLEVEL_OUTOF10: 4
PAINLEVEL_OUTOF10: 6
PAINLEVEL_OUTOF10: 3
PAINLEVEL_OUTOF10: 4
PAINLEVEL_OUTOF10: 7

## 2025-05-25 ASSESSMENT — PAIN DESCRIPTION - LOCATION
LOCATION: LEG;HIP
LOCATION: HIP;LEG
LOCATION: HEAD
LOCATION: LEG;HIP
LOCATION: LEG

## 2025-05-25 ASSESSMENT — PAIN DESCRIPTION - ORIENTATION
ORIENTATION: RIGHT;LEFT
ORIENTATION: RIGHT;LEFT
ORIENTATION: LEFT
ORIENTATION: LEFT
ORIENTATION: RIGHT;LEFT

## 2025-05-25 ASSESSMENT — PAIN DESCRIPTION - FREQUENCY
FREQUENCY: CONTINUOUS
FREQUENCY: CONTINUOUS

## 2025-05-25 ASSESSMENT — PAIN DESCRIPTION - ONSET
ONSET: ON-GOING
ONSET: GRADUAL

## 2025-05-25 ASSESSMENT — PAIN - FUNCTIONAL ASSESSMENT
PAIN_FUNCTIONAL_ASSESSMENT: ACTIVITIES ARE NOT PREVENTED
PAIN_FUNCTIONAL_ASSESSMENT: PREVENTS OR INTERFERES SOME ACTIVE ACTIVITIES AND ADLS

## 2025-05-25 NOTE — PLAN OF CARE
Problem: Chronic Conditions and Co-morbidities  Goal: Patient's chronic conditions and co-morbidity symptoms are monitored and maintained or improved  5/25/2025 0914 by Amada Escobar RN  Outcome: Progressing  5/24/2025 2242 by Brooke Lopes RN  Outcome: Progressing     Problem: Discharge Planning  Goal: Discharge to home or other facility with appropriate resources  5/25/2025 0914 by Amada Escobar RN  Outcome: Progressing  5/24/2025 2242 by Brooke Lopes RN  Outcome: Progressing     Problem: Pain  Goal: Verbalizes/displays adequate comfort level or baseline comfort level  5/25/2025 0914 by Amada Escobar RN  Outcome: Progressing  5/24/2025 2242 by Brooke Lopes RN  Outcome: Progressing     Problem: Skin/Tissue Integrity  Goal: Skin integrity remains intact  Description: 1.  Monitor for areas of redness and/or skin breakdown2.  Assess vascular access sites hourly3.  Every 4-6 hours minimum:  Change oxygen saturation probe site4.  Every 4-6 hours:  If on nasal continuous positive airway pressure, respiratory therapy assess nares and determine need for appliance change or resting period  5/25/2025 0914 by Amada Escobar RN  Outcome: Progressing  5/24/2025 2242 by Brooke Lopes RN  Outcome: Progressing     Problem: ABCDS Injury Assessment  Goal: Absence of physical injury  5/25/2025 0914 by Amada Escobar RN  Outcome: Progressing  5/24/2025 2242 by Brooke Lopes RN  Outcome: Progressing     Problem: Safety - Adult  Goal: Free from fall injury  5/25/2025 0914 by Amada Escobar RN  Outcome: Progressing  5/24/2025 2242 by Brooke Lopes RN  Outcome: Progressing

## 2025-05-25 NOTE — PLAN OF CARE
Problem: Chronic Conditions and Co-morbidities  Goal: Patient's chronic conditions and co-morbidity symptoms are monitored and maintained or improved  5/24/2025 2242 by Brooke Lopes RN  Outcome: Progressing  5/24/2025 1129 by Amada Escobar RN  Outcome: Progressing  Flowsheets (Taken 5/24/2025 0815)  Care Plan - Patient's Chronic Conditions and Co-Morbidity Symptoms are Monitored and Maintained or Improved:   Update acute care plan with appropriate goals if chronic or comorbid symptoms are exacerbated and prevent overall improvement and discharge   Collaborate with multidisciplinary team to address chronic and comorbid conditions and prevent exacerbation or deterioration   Monitor and assess patient's chronic conditions and comorbid symptoms for stability, deterioration, or improvement     Problem: Pain  Goal: Verbalizes/displays adequate comfort level or baseline comfort level  5/24/2025 2242 by Brooke Lopes RN  Outcome: Progressing  5/24/2025 1129 by Amada Escobar RN  Outcome: Progressing  Flowsheets (Taken 5/24/2025 0815)  Verbalizes/displays adequate comfort level or baseline comfort level:   Encourage patient to monitor pain and request assistance   Assess pain using appropriate pain scale   Implement non-pharmacological measures as appropriate and evaluate response   Consider cultural and social influences on pain and pain management   Notify Licensed Independent Practitioner if interventions unsuccessful or patient reports new pain   Administer analgesics based on type and severity of pain and evaluate response     Problem: Skin/Tissue Integrity  Goal: Skin integrity remains intact  Description: 1.  Monitor for areas of redness and/or skin breakdown2.  Assess vascular access sites hourly3.  Every 4-6 hours minimum:  Change oxygen saturation probe site4.  Every 4-6 hours:  If on nasal continuous positive airway pressure, respiratory therapy assess nares and determine need for appliance change or  resting period  5/24/2025 2242 by Brooke Lopes RN  Outcome: Progressing  5/24/2025 1129 by Amada Escobar RN  Outcome: Progressing  Flowsheets (Taken 5/24/2025 0815)  Skin Integrity Remains Intact:   Assess vascular access sites hourly   Monitor for areas of redness and/or skin breakdown     Problem: ABCDS Injury Assessment  Goal: Absence of physical injury  5/24/2025 2242 by Brooke Lopes RN  Outcome: Progressing  5/24/2025 1129 by Amada Escobar RN  Outcome: Progressing     Problem: Safety - Adult  Goal: Free from fall injury  5/24/2025 2242 by Brooke Lopes RN  Outcome: Progressing  5/24/2025 1129 by Amada Escobar RN  Outcome: Progressing     Problem: Discharge Planning  Goal: Discharge to home or other facility with appropriate resources  5/24/2025 2242 by Brooke Lopes RN  Outcome: Progressing  5/24/2025 1129 by Amada sEcobar RN  Outcome: Progressing  Flowsheets (Taken 5/24/2025 0815)  Discharge to home or other facility with appropriate resources:   Identify barriers to discharge with patient and caregiver   Arrange for needed discharge resources and transportation as appropriate   Arrange for interpreters to assist at discharge as needed   Refer to discharge planning if patient needs post-hospital services based on physician order or complex needs related to functional status, cognitive ability or social support system   Identify discharge learning needs (meds, wound care, etc)

## 2025-05-25 NOTE — PROGRESS NOTES
05/25/25 1006   RT Protocol   History Pulmonary Disease 0   Respiratory pattern 0   Breath sounds 0   Cough 0   Indications for Bronchodilator Therapy None   Bronchodilator Assessment Score 0

## 2025-05-25 NOTE — PROGRESS NOTES
Straight cath done aseptically, output= 725 ml. Patient tolerated well. Electronically signed by Brooke Lopes RN on 5/24/2025 at 10:18 PM

## 2025-05-25 NOTE — PROGRESS NOTES
Patient complaining that her bladder is full and she don't have the urge to pee. Last documented output was at 4 am which was 400 ml. Per day shift RN patient had 300 ml but no recorded output on her I and O's. Canister emptied at 100 ml. Bladder scan done showed 621 ml. MIKAYLA Macdonald made aware with new orders to do straight cath. Electronically signed by Brooke Lopes RN on 5/24/2025 at 9:42 PM

## 2025-05-25 NOTE — ACP (ADVANCE CARE PLANNING)
Advance Care Planning     Advance Care Planning Inpatient Note  Spiritual Care Department    Today's Date: 5/25/2025  Unit: [facility] C5 - MED SURG/ORTHO    Received request from IDT Member.  Upon review of chart and communication with care team, Spiritual Care will defer advance care planning with patient at this time.  3rd Attempt - Courtney sleeping  Follow up on request from CM for support with Advanced Directives    Health Care Decision Makers:      Primary Decision Maker: Thalia Hoffman - Child - [phone]    Secondary Decision Maker: BrandonDarren - Child - [phone]  Summary:  Chart review - Courtney has 2 decision makers listed in chart, reflected above.  No scanned documents.  If patient has decision-making capacity, [facility] is able to help with completing HCPOA/LW documents while admitted or with a follow up outpatient.  Blank documents have been given to patient/family.      Advance Care Planning Documents (Patient Wishes):  {Advance Care Planning Documents:809934044}     Assessment:  ***{In the assessment narrative, address the way holistic dimensions influence ACP decisions - medical, psychological, psychosocial, family systems, ethical, cultural, societal and spiritual:60680}    Interventions:  {ACP Interventions:64508}    Care Preferences Communicated:   {ACP; SC Care Preferences:73762}    Outcomes/Plan:  {ACP Outcomes:58455}    Electronically signed by Lamberto Wei, {ACP; Spiritual Care Credentials:36436} on 5/25/2025 at 10:59 AM

## 2025-05-25 NOTE — PROGRESS NOTES
Shift assessment done. All night time medications given per MAR. Patient took all her oral medications whole with water, tolerated well. All fall and contact precautions implemented. All needs attended. Electronically signed by Brooke Lopes RN on 5/24/2025 at 10:43 PM

## 2025-05-25 NOTE — PROGRESS NOTES
Bladder scan done showed 557 ml, patient complaining of lower abdominal pain and fullness in her bladder and doesn't have the urge to pee at this time, NP Renae made aware, ordered straight cath. Electronically signed by Brooke Lopes RN on 5/25/2025 at 4:00 AM

## 2025-05-25 NOTE — PROGRESS NOTES
[facility] Medicine Progress Note  V 5.1      Date of Admission: 5/21/2025    Hospital Day: 5      Chief Admission Complaint:  Headache    Subjective:  Emr and notes reviewed pt in bed NAD. Reports she pain all over, had to be ISC X 2 last night. No chest pain or sob. Discussed  with nursing     Presenting Admission History:       85 y.o. female who presented to [facility] with headache.  PMHx significant for diabetes, previous TIANA/renal failure, chronic back and left hip pain, hard of hearing, fibromyalgia, anxiety, thyroid cancer, diabetes.  She reports headache for 2 days with bodyaches.  No fevers or chills.  Family states that patient has had renal injury and hyponatremia in the past.  Patient lives at a nursing home (possibly [facility]) and family is unaware of her p.o. intake.  Patient's left hip pain and body pain has been chronic for years.  Per grandchildren, patient was also having slurred speech and her face was sagging at nursing home.  No other known weakness or deficits in all this seems to have resolved.  Patient has had hypotension in the ED.  Blood work in the ED significant for hemoglobin 10.7, sodium 128, creatinine 3.5, GFR 12.  Chest x-ray showed no acute abnormality.  CT head showed no acute abnormality.  Patient was given 2 fluid boluses.  Will admit patient for dehydration, TIANA, hyponatremia, CVA workup.     Assessment/Plan:       TIANA - with elevated BUN/Cr ratio, likely secondary to pre-renal azotemia.  Volume expansion given. Nephrology following. Holding Lasix, Jardiance, Losartan and Nifedipine. Will trial off IVF hydration. Monitor BMP.   - crt has normalized   -good uo     Hyponatremia  - Likely secondary to decreased volume status from decreased p.o. intake  - Urine sodium, urine osmolality, serum osmolality pending  - Nephrology following  - 5/25 Na 142      Acute Cystitis: Admission UA showed moderate pyuria. Her UCx finalized 5/23 PM and showed MDR  provider] furosemide  20 mg Oral Every Other Day    methocarbamol  750 mg Oral TID    pantoprazole  40 mg Oral QAM AC    insulin lispro  0-4 Units SubCUTAneous 4x Daily AC & HS    sodium chloride flush  5-40 mL IntraVENous 2 times per day    heparin (porcine)  5,000 Units SubCUTAneous 3 times per day    miconazole   Topical BID    fentanNYL  25 mcg IntraVENous Once     PRN Meds: oxyCODONE-acetaminophen, sulfur hexafluoride microspheres, glucose, dextrose bolus **OR** dextrose bolus, glucagon (rDNA), dextrose, sodium chloride flush, sodium chloride, ondansetron **OR** ondansetron, polyethylene glycol, acetaminophen **OR** acetaminophen, LORazepam      Physical Exam Performed:      General appearance:  No apparent distress  Respiratory:  Normal respiratory effort.   Cardiovascular:  Regular rate and rhythm.  Abdomen:  Soft, non-tender, non-distended.  Musculoskeletal:  No edema  Neurologic:  Non-focal  Psychiatric:  Drowsy but oriented, limited insight    BP (!) 165/92   Pulse 84   Temp 98.4 °F (36.9 °C) (Oral)   Resp 17   Ht 1.524 m (5')   Wt 102.3 kg (225 lb 8.5 oz)   SpO2 94%   BMI 44.05 kg/m²     Telemetry:      Personally reviewed and interpreted telemetry (Rhythm Strip) on 5/25/2025.  Patient is currently ON tele demonstrating SR with HR 90s .    Diet: ADULT DIET; Regular; 4 carb choices (60 gm/meal)    DVT Prophylaxis: SQ Heparin    Code status: Full Code    PT/OT Eval Status: Seen w/ recs for SNF    Multi-Disciplinary Rounds with Case Management completed on 5/25/2025 with the following recs:     Anticipated Discharge Location: [facility]     Anticipated Discharge Day/Date:  1-2 days     Barriers to Discharge: Clinical Course and Subspecialty recs    Likely rate limiting factor: TIANA has improved but her PO intake remains limited today. Will monitor 24H for stability of renal function off fluids and with PO intake prior to returning to [facility].

## 2025-05-25 NOTE — PROGRESS NOTES
Straight cath done aseptically, output= 750 ml. Patient tolerated well. Electronically signed by Brooke Lopes RN on 5/25/2025 at 4:17 AM

## 2025-05-25 NOTE — PROGRESS NOTES
[facility] Progress Note           Subjective/   85 y.o. year old female who we are seeing in consultation for TIANA.     HPI:  Renal function improving with IVF's.  States can not urination, retaining urine on bladder scans, ~600 ml twice now.  Denies any shortness of breath, on RA.    ROS:  +weak, intake reduced.    Objective/   GEN:  Chronically ill, BP (!) 165/92   Pulse 84   Temp 98.4 °F (36.9 °C) (Oral)   Resp 17   Ht 1.524 m (5')   Wt 102.3 kg (225 lb 8.5 oz)   SpO2 94%   BMI 44.05 kg/m²   HEENT: non-icteric, no JVD  CV: S1, S2 without m/r/g; no edema  RESP: CTA B without w/r/r; breathing wnl  ABD: +bs, soft, nt, no hsm  SKIN: warm, no rashes    Data/  Recent Labs     05/23/25  0510 05/24/25  0511   WBC 6.6 7.6   HGB 11.0* 11.1*   HCT 32.8* 32.9*   MCV 93.1 92.1    380     Recent Labs     05/23/25  0510 05/24/25  0511    142   K 3.9 3.9    108   CO2 23 25   GLUCOSE 114* 116*   BUN 27* 15   CREATININE 1.0 0.7   LABGLOM 56* 85       Assessment/     - Acute kidney injury - pre-renal/renal hypoperfusion injury in setting of UTI   Peak SCr 3.5 on admission, resolved with IVF's, non-oliguric     - Hypernatremia    - Hypertension    - Urinary retention       Plan/     - Resumed maintenance IVF's with 1/2 NS while intake reduced from 5/24  - Lasix, jardiance on hold  - Resumed nifedepine and metoprolol home doses from 5/23, resume losartan home dose today  - Roach mgmt with trial of flomax today  - Trend labs, bp's, weights, & urine output     ____________________________________  Butch Barakat MD  [facility]  [email or web address]  Office: [phone]

## 2025-05-26 LAB
ALBUMIN SERPL-MCNC: 3 G/DL (ref 3.4–5)
ANION GAP SERPL CALCULATED.3IONS-SCNC: 9 MMOL/L (ref 3–16)
BACTERIA UR CULT: NORMAL
BUN SERPL-MCNC: 6 MG/DL (ref 7–20)
CALCIUM SERPL-MCNC: 8.6 MG/DL (ref 8.3–10.6)
CHLORIDE SERPL-SCNC: 104 MMOL/L (ref 99–110)
CO2 SERPL-SCNC: 26 MMOL/L (ref 21–32)
CREAT SERPL-MCNC: 0.5 MG/DL (ref 0.6–1.2)
DEPRECATED RDW RBC AUTO: 14.2 % (ref 12.4–15.4)
GFR SERPLBLD CREATININE-BSD FMLA CKD-EPI: >90 ML/MIN/{1.73_M2}
GLUCOSE BLD-MCNC: 101 MG/DL (ref 70–99)
GLUCOSE BLD-MCNC: 107 MG/DL (ref 70–99)
GLUCOSE BLD-MCNC: 111 MG/DL (ref 70–99)
GLUCOSE BLD-MCNC: 91 MG/DL (ref 70–99)
GLUCOSE SERPL-MCNC: 109 MG/DL (ref 70–99)
HCT VFR BLD AUTO: 36.1 % (ref 36–48)
HGB BLD-MCNC: 12.1 G/DL (ref 12–16)
MAGNESIUM SERPL-MCNC: 1.68 MG/DL (ref 1.8–2.4)
MCH RBC QN AUTO: 30.6 PG (ref 26–34)
MCHC RBC AUTO-ENTMCNC: 33.5 G/DL (ref 31–36)
MCV RBC AUTO: 91.3 FL (ref 80–100)
PERFORMED ON: ABNORMAL
PERFORMED ON: NORMAL
PHOSPHATE SERPL-MCNC: 1.6 MG/DL (ref 2.5–4.9)
PLATELET # BLD AUTO: 431 K/UL (ref 135–450)
PMV BLD AUTO: 6.8 FL (ref 5–10.5)
POTASSIUM SERPL-SCNC: 3.4 MMOL/L (ref 3.5–5.1)
POTASSIUM SERPL-SCNC: 3.4 MMOL/L (ref 3.5–5.1)
RBC # BLD AUTO: 3.96 M/UL (ref 4–5.2)
SODIUM SERPL-SCNC: 139 MMOL/L (ref 136–145)
WBC # BLD AUTO: 8.6 K/UL (ref 4–11)

## 2025-05-26 PROCEDURE — 1200000000 HC SEMI PRIVATE

## 2025-05-26 PROCEDURE — 85027 COMPLETE CBC AUTOMATED: CPT

## 2025-05-26 PROCEDURE — 51702 INSERT TEMP BLADDER CATH: CPT

## 2025-05-26 PROCEDURE — 6370000000 HC RX 637 (ALT 250 FOR IP)

## 2025-05-26 PROCEDURE — 36415 COLL VENOUS BLD VENIPUNCTURE: CPT

## 2025-05-26 PROCEDURE — 2580000003 HC RX 258: Performed by: INTERNAL MEDICINE

## 2025-05-26 PROCEDURE — 2500000003 HC RX 250 WO HCPCS: Performed by: INTERNAL MEDICINE

## 2025-05-26 PROCEDURE — 6370000000 HC RX 637 (ALT 250 FOR IP): Performed by: NURSE PRACTITIONER

## 2025-05-26 PROCEDURE — 6370000000 HC RX 637 (ALT 250 FOR IP): Performed by: INTERNAL MEDICINE

## 2025-05-26 PROCEDURE — 83735 ASSAY OF MAGNESIUM: CPT

## 2025-05-26 PROCEDURE — 6360000002 HC RX W HCPCS: Performed by: NURSE PRACTITIONER

## 2025-05-26 PROCEDURE — 6360000002 HC RX W HCPCS

## 2025-05-26 PROCEDURE — 80069 RENAL FUNCTION PANEL: CPT

## 2025-05-26 RX ORDER — MAGNESIUM SULFATE 1 G/100ML
1000 INJECTION INTRAVENOUS ONCE
Status: COMPLETED | OUTPATIENT
Start: 2025-05-26 | End: 2025-05-26

## 2025-05-26 RX ORDER — POTASSIUM CHLORIDE 1500 MG/1
20 TABLET, EXTENDED RELEASE ORAL ONCE
Status: COMPLETED | OUTPATIENT
Start: 2025-05-26 | End: 2025-05-26

## 2025-05-26 RX ADMIN — ATORVASTATIN CALCIUM 40 MG: 40 TABLET, FILM COATED ORAL at 08:03

## 2025-05-26 RX ADMIN — SODIUM CHLORIDE: 0.45 INJECTION, SOLUTION INTRAVENOUS at 08:11

## 2025-05-26 RX ADMIN — POTASSIUM CHLORIDE 20 MEQ: 1500 TABLET, EXTENDED RELEASE ORAL at 08:03

## 2025-05-26 RX ADMIN — METHOCARBAMOL 750 MG: 750 TABLET ORAL at 13:25

## 2025-05-26 RX ADMIN — POTASSIUM PHOSPHATE, MONOBASIC POTASSIUM PHOSPHATE, DIBASIC 20 MMOL: 224; 236 INJECTION, SOLUTION, CONCENTRATE INTRAVENOUS at 14:23

## 2025-05-26 RX ADMIN — ASPIRIN 81 MG: 81 TABLET, COATED ORAL at 08:03

## 2025-05-26 RX ADMIN — HEPARIN SODIUM 5000 UNITS: 5000 INJECTION INTRAVENOUS; SUBCUTANEOUS at 20:10

## 2025-05-26 RX ADMIN — OXYCODONE AND ACETAMINOPHEN 1 TABLET: 5; 325 TABLET ORAL at 00:37

## 2025-05-26 RX ADMIN — OXYCODONE AND ACETAMINOPHEN 1 TABLET: 5; 325 TABLET ORAL at 22:38

## 2025-05-26 RX ADMIN — OXYCODONE AND ACETAMINOPHEN 1 TABLET: 5; 325 TABLET ORAL at 08:03

## 2025-05-26 RX ADMIN — SODIUM CHLORIDE: 0.45 INJECTION, SOLUTION INTRAVENOUS at 22:41

## 2025-05-26 RX ADMIN — METOPROLOL TARTRATE 25 MG: 25 TABLET, FILM COATED ORAL at 08:03

## 2025-05-26 RX ADMIN — MICONAZOLE NITRATE: 2 POWDER TOPICAL at 20:11

## 2025-05-26 RX ADMIN — HYDRALAZINE HYDROCHLORIDE 10 MG: 20 INJECTION INTRAMUSCULAR; INTRAVENOUS at 13:25

## 2025-05-26 RX ADMIN — MEMANTINE 5 MG: 5 TABLET ORAL at 08:03

## 2025-05-26 RX ADMIN — DONEPEZIL HYDROCHLORIDE 10 MG: 5 TABLET, FILM COATED ORAL at 20:11

## 2025-05-26 RX ADMIN — HYDRALAZINE HYDROCHLORIDE 10 MG: 20 INJECTION INTRAMUSCULAR; INTRAVENOUS at 20:18

## 2025-05-26 RX ADMIN — OXYCODONE AND ACETAMINOPHEN 1 TABLET: 5; 325 TABLET ORAL at 17:38

## 2025-05-26 RX ADMIN — HEPARIN SODIUM 5000 UNITS: 5000 INJECTION INTRAVENOUS; SUBCUTANEOUS at 06:22

## 2025-05-26 RX ADMIN — PANTOPRAZOLE SODIUM 40 MG: 40 TABLET, DELAYED RELEASE ORAL at 06:22

## 2025-05-26 RX ADMIN — NITROFURANTOIN MONOHYDRATE/MACROCRYSTALLINE 100 MG: 25; 75 CAPSULE ORAL at 09:28

## 2025-05-26 RX ADMIN — CITALOPRAM HYDROBROMIDE 20 MG: 20 TABLET ORAL at 08:03

## 2025-05-26 RX ADMIN — TAMSULOSIN HYDROCHLORIDE 0.4 MG: 0.4 CAPSULE ORAL at 08:03

## 2025-05-26 RX ADMIN — GABAPENTIN 300 MG: 300 CAPSULE ORAL at 08:02

## 2025-05-26 RX ADMIN — MAGNESIUM SULFATE HEPTAHYDRATE 1000 MG: 1 INJECTION, SOLUTION INTRAVENOUS at 08:13

## 2025-05-26 RX ADMIN — NIFEDIPINE 30 MG: 30 TABLET, FILM COATED, EXTENDED RELEASE ORAL at 08:03

## 2025-05-26 RX ADMIN — NITROFURANTOIN MONOHYDRATE/MACROCRYSTALLINE 100 MG: 25; 75 CAPSULE ORAL at 20:11

## 2025-05-26 RX ADMIN — LEVOTHYROXINE SODIUM 125 MCG: 0.12 TABLET ORAL at 06:22

## 2025-05-26 RX ADMIN — METHOCARBAMOL 750 MG: 750 TABLET ORAL at 20:11

## 2025-05-26 RX ADMIN — OXYCODONE AND ACETAMINOPHEN 1 TABLET: 5; 325 TABLET ORAL at 13:25

## 2025-05-26 RX ADMIN — MICONAZOLE NITRATE: 2 POWDER TOPICAL at 08:03

## 2025-05-26 RX ADMIN — LOSARTAN POTASSIUM 50 MG: 25 TABLET, FILM COATED ORAL at 08:03

## 2025-05-26 RX ADMIN — METHOCARBAMOL 750 MG: 750 TABLET ORAL at 08:03

## 2025-05-26 RX ADMIN — HEPARIN SODIUM 5000 UNITS: 5000 INJECTION INTRAVENOUS; SUBCUTANEOUS at 14:15

## 2025-05-26 ASSESSMENT — PAIN SCALES - GENERAL
PAINLEVEL_OUTOF10: 10
PAINLEVEL_OUTOF10: 6
PAINLEVEL_OUTOF10: 7
PAINLEVEL_OUTOF10: 6

## 2025-05-26 ASSESSMENT — PAIN DESCRIPTION - LOCATION
LOCATION: LEG
LOCATION: HEAD
LOCATION: LEG

## 2025-05-26 ASSESSMENT — PAIN DESCRIPTION - ORIENTATION
ORIENTATION: LEFT
ORIENTATION: LEFT
ORIENTATION: MID

## 2025-05-26 ASSESSMENT — PAIN DESCRIPTION - DESCRIPTORS
DESCRIPTORS: ACHING
DESCRIPTORS: ACHING;OTHER (COMMENT)

## 2025-05-26 NOTE — PLAN OF CARE
Problem: Pain  Goal: Verbalizes/displays adequate comfort level or baseline comfort level  Flowsheets (Taken 5/24/2025 0815 by Amada Escobar, RN)  Verbalizes/displays adequate comfort level or baseline comfort level:   Encourage patient to monitor pain and request assistance   Assess pain using appropriate pain scale   Implement non-pharmacological measures as appropriate and evaluate response   Consider cultural and social influences on pain and pain management   Notify Licensed Independent Practitioner if interventions unsuccessful or patient reports new pain   Administer analgesics based on type and severity of pain and evaluate response     Problem: Skin/Tissue Integrity  Goal: Skin integrity remains intact  Description: 1.  Monitor for areas of redness and/or skin breakdown2.  Assess vascular access sites hourly3.  Every 4-6 hours minimum:  Change oxygen saturation probe site4.  Every 4-6 hours:  If on nasal continuous positive airway pressure, respiratory therapy assess nares and determine need for appliance change or resting period  Flowsheets (Taken 5/25/2025 0956 by Amada Escobar, RN)  Skin Integrity Remains Intact:   Monitor for areas of redness and/or skin breakdown   Assess vascular access sites hourly

## 2025-05-26 NOTE — PROGRESS NOTES
[facility] Progress Note           Subjective/   85 y.o. year old female who we are seeing in consultation for TIANA.     HPI:  Renal function improved with IVF's, non-oliguric, now with granda from 5/25 due to urinary retention.  Denies any shortness of breath, on RA.    ROS:  +weak, intake reduced, c/o pain.    Objective/   GEN:  Chronically ill, BP (!) 190/92   Pulse 89   Temp 98.4 °F (36.9 °C) (Oral)   Resp 16   Ht 1.524 m (5')   Wt 102.3 kg (225 lb 8.5 oz)   SpO2 93%   BMI 44.05 kg/m²   HEENT: non-icteric, no JVD  CV: S1, S2 without m/r/g; no edema  RESP: CTA B without w/r/r; breathing wnl  ABD: +bs, soft, nt, no hsm  SKIN: warm, no rashes    Data/  Recent Labs     05/24/25  0511 05/25/25  1041 05/26/25  0549   WBC 7.6 10.4 8.6   HGB 11.1* 11.5* 12.1   HCT 32.9* 34.1* 36.1   MCV 92.1 91.2 91.3    438 431     Recent Labs     05/24/25  0511 05/25/25  1041 05/26/25  0549    140 139   K 3.9 3.9 3.4*  3.4*    106 104   CO2 25 27 26   GLUCOSE 116* 129* 109*   PHOS  --   --  1.6*   MG  --   --  1.68*   BUN 15 7 6*   CREATININE 0.7 0.5* 0.5*   LABGLOM 85 >90 >90       Assessment/     - Acute kidney injury - pre-renal/renal hypoperfusion injury in setting of UTI   Peak SCr 3.5 on admission, resolved with IVF's, non-oliguric     - Hypernatremia    - Hypertension    - Urinary retention - granda from 5/25       Plan/     - Resumed maintenance IVF's with 1/2 NS while intake reduced from 5/24, can stop once intake better  - Lasix, jardiance on hold  - Resumed nifedepine and metoprolol home doses from 5/23 & losartan from 5/25, prn IV hydralazine for SBP above 160  - Granda mgmt with trial of flomax from 5/25  - Trend labs, bp's, weights, & urine output    No further recommendations at this time  Will sign off for now  Call with questions  Case d/w Admitting team     ____________________________________  Butch Barakat MD  [facility]

## 2025-05-26 NOTE — PLAN OF CARE
Problem: Chronic Conditions and Co-morbidities  Goal: Patient's chronic conditions and co-morbidity symptoms are monitored and maintained or improved  5/25/2025 2132 by Mary Avalos RN  Outcome: Progressing     Problem: Pain  Goal: Verbalizes/displays adequate comfort level or baseline comfort level  5/25/2025 2132 by Mary Avalos RN  Outcome: Progressing  Pt scoring pain on 0-10 scale. Pain medications given per MAR. Pt instructed to call out when pain level increasing. Call light within reach.     Problem: Skin/Tissue Integrity  Goal: Skin integrity remains intact  Description: 1.  Monitor for areas of redness and/or skin breakdown2.  Assess vascular access sites hourly3.  Every 4-6 hours minimum:  Change oxygen saturation probe site4.  Every 4-6 hours:  If on nasal continuous positive airway pressure, respiratory therapy assess nares and determine need for appliance change or resting period  5/25/2025 2132 by Mary Avalos RN  Outcome: Progressing    Problem: Discharge Planning  Goal: Discharge to home or other facility with appropriate resources  5/25/2025 2132 by Mary Avalos RN  Outcome: Progressing    Problem: Safety - Adult  Goal: Free from fall injury  5/25/2025 2132 by Mary Avalos RN  Outcome: Progressing  Bed in lowest position, wheels locked, 2/4 side rails up, nonskid footwear on. Bed/ chair check alarm in place, call light within reach. Pt instructed to call out when needing assistance. Pt stated understanding.

## 2025-05-26 NOTE — PROGRESS NOTES
[facility] Medicine Progress Note  V 5.1      Date of Admission: 5/21/2025    Hospital Day: 6      Chief Admission Complaint:  Headache    Subjective:  Emr and notes reviewed pt in bed NAD.reports pain a little better, less anxious today.         Presenting Admission History:       85 y.o. female who presented to [facility] with headache.  PMHx significant for diabetes, previous TIANA/renal failure, chronic back and left hip pain, hard of hearing, fibromyalgia, anxiety, thyroid cancer, diabetes.  She reports headache for 2 days with bodyaches.  No fevers or chills.  Family states that patient has had renal injury and hyponatremia in the past.  Patient lives at a nursing home ([facility]) and family is unaware of her p.o. intake.  Patient's left hip pain and body pain has been chronic for years.  Per grandchildren, patient was also having slurred speech and her face was sagging at nursing home.  No other known weakness or deficits in all this seems to have resolved.  Patient has had hypotension in the ED.  Blood work in the ED significant for hemoglobin 10.7, sodium 128, creatinine 3.5, GFR 12.  Chest x-ray showed no acute abnormality.  CT head showed no acute abnormality.  Patient was given 2 fluid boluses.  Will admit patient for dehydration, TIANA, hyponatremia, CVA workup.     Assessment/Plan:       TIANA - with elevated BUN/Cr ratio, likely secondary to pre-renal azotemia.  Volume expansion given. Nephrology following. Holding Lasix, Jardiance, Losartan and Nifedipine. Will trial off IVF hydration. Monitor BMP.   - crt has normalized   -good uo     Hyponatremia  - Likely secondary to decreased volume status from decreased p.o. intake  - Urine sodium, urine osmolality, serum osmolality pending  - Nephrology following  - appears resolved     HypoKalemia - etiology clinically unable to determine.  Followed serial Potassium, personally reviewed and documented in this note. Replaced

## 2025-05-27 LAB
ANION GAP SERPL CALCULATED.3IONS-SCNC: 12 MMOL/L (ref 3–16)
BUN SERPL-MCNC: 6 MG/DL (ref 7–20)
CALCIUM SERPL-MCNC: 8.5 MG/DL (ref 8.3–10.6)
CHLORIDE SERPL-SCNC: 103 MMOL/L (ref 99–110)
CO2 SERPL-SCNC: 22 MMOL/L (ref 21–32)
CREAT SERPL-MCNC: 0.5 MG/DL (ref 0.6–1.2)
DEPRECATED RDW RBC AUTO: 14.3 % (ref 12.4–15.4)
GFR SERPLBLD CREATININE-BSD FMLA CKD-EPI: >90 ML/MIN/{1.73_M2}
GLUCOSE BLD-MCNC: 106 MG/DL (ref 70–99)
GLUCOSE BLD-MCNC: 110 MG/DL (ref 70–99)
GLUCOSE BLD-MCNC: 141 MG/DL (ref 70–99)
GLUCOSE BLD-MCNC: 95 MG/DL (ref 70–99)
GLUCOSE SERPL-MCNC: 91 MG/DL (ref 70–99)
HCT VFR BLD AUTO: 36.3 % (ref 36–48)
HGB BLD-MCNC: 12.2 G/DL (ref 12–16)
MCH RBC QN AUTO: 31.1 PG (ref 26–34)
MCHC RBC AUTO-ENTMCNC: 33.8 G/DL (ref 31–36)
MCV RBC AUTO: 92 FL (ref 80–100)
PERFORMED ON: ABNORMAL
PERFORMED ON: NORMAL
PLATELET # BLD AUTO: 423 K/UL (ref 135–450)
PMV BLD AUTO: 6.6 FL (ref 5–10.5)
POTASSIUM SERPL-SCNC: 3.8 MMOL/L (ref 3.5–5.1)
RBC # BLD AUTO: 3.94 M/UL (ref 4–5.2)
SODIUM SERPL-SCNC: 137 MMOL/L (ref 136–145)
WBC # BLD AUTO: 7 K/UL (ref 4–11)

## 2025-05-27 PROCEDURE — 97110 THERAPEUTIC EXERCISES: CPT

## 2025-05-27 PROCEDURE — 2500000003 HC RX 250 WO HCPCS

## 2025-05-27 PROCEDURE — 80048 BASIC METABOLIC PNL TOTAL CA: CPT

## 2025-05-27 PROCEDURE — 6370000000 HC RX 637 (ALT 250 FOR IP): Performed by: INTERNAL MEDICINE

## 2025-05-27 PROCEDURE — 36415 COLL VENOUS BLD VENIPUNCTURE: CPT

## 2025-05-27 PROCEDURE — 6370000000 HC RX 637 (ALT 250 FOR IP): Performed by: NURSE PRACTITIONER

## 2025-05-27 PROCEDURE — 85027 COMPLETE CBC AUTOMATED: CPT

## 2025-05-27 PROCEDURE — 92526 ORAL FUNCTION THERAPY: CPT

## 2025-05-27 PROCEDURE — 6360000002 HC RX W HCPCS

## 2025-05-27 PROCEDURE — 97530 THERAPEUTIC ACTIVITIES: CPT

## 2025-05-27 PROCEDURE — 6370000000 HC RX 637 (ALT 250 FOR IP)

## 2025-05-27 PROCEDURE — 51702 INSERT TEMP BLADDER CATH: CPT

## 2025-05-27 PROCEDURE — 1200000000 HC SEMI PRIVATE

## 2025-05-27 RX ORDER — DICYCLOMINE HCL 20 MG
20 TABLET ORAL
Status: DISCONTINUED | OUTPATIENT
Start: 2025-05-27 | End: 2025-05-30 | Stop reason: HOSPADM

## 2025-05-27 RX ORDER — METOPROLOL TARTRATE 50 MG
50 TABLET ORAL DAILY
Status: DISCONTINUED | OUTPATIENT
Start: 2025-05-27 | End: 2025-05-30 | Stop reason: HOSPADM

## 2025-05-27 RX ORDER — LOPERAMIDE HYDROCHLORIDE 2 MG/1
4 CAPSULE ORAL 4 TIMES DAILY PRN
Status: DISCONTINUED | OUTPATIENT
Start: 2025-05-27 | End: 2025-05-30 | Stop reason: HOSPADM

## 2025-05-27 RX ADMIN — MEMANTINE 5 MG: 5 TABLET ORAL at 08:46

## 2025-05-27 RX ADMIN — METHOCARBAMOL 750 MG: 750 TABLET ORAL at 20:22

## 2025-05-27 RX ADMIN — HEPARIN SODIUM 5000 UNITS: 5000 INJECTION INTRAVENOUS; SUBCUTANEOUS at 20:22

## 2025-05-27 RX ADMIN — GABAPENTIN 300 MG: 300 CAPSULE ORAL at 08:46

## 2025-05-27 RX ADMIN — NITROFURANTOIN MONOHYDRATE/MACROCRYSTALLINE 100 MG: 25; 75 CAPSULE ORAL at 08:44

## 2025-05-27 RX ADMIN — ASPIRIN 81 MG: 81 TABLET, COATED ORAL at 08:46

## 2025-05-27 RX ADMIN — OXYCODONE AND ACETAMINOPHEN 1 TABLET: 5; 325 TABLET ORAL at 12:06

## 2025-05-27 RX ADMIN — LOPERAMIDE HYDROCHLORIDE 4 MG: 2 CAPSULE ORAL at 20:22

## 2025-05-27 RX ADMIN — DICYCLOMINE HYDROCHLORIDE 20 MG: 20 TABLET ORAL at 11:53

## 2025-05-27 RX ADMIN — HEPARIN SODIUM 5000 UNITS: 5000 INJECTION INTRAVENOUS; SUBCUTANEOUS at 14:36

## 2025-05-27 RX ADMIN — METOPROLOL TARTRATE 50 MG: 50 TABLET, FILM COATED ORAL at 08:47

## 2025-05-27 RX ADMIN — METHOCARBAMOL 750 MG: 750 TABLET ORAL at 08:46

## 2025-05-27 RX ADMIN — DICYCLOMINE HYDROCHLORIDE 20 MG: 20 TABLET ORAL at 17:11

## 2025-05-27 RX ADMIN — PANTOPRAZOLE SODIUM 40 MG: 40 TABLET, DELAYED RELEASE ORAL at 06:27

## 2025-05-27 RX ADMIN — OXYCODONE AND ACETAMINOPHEN 1 TABLET: 5; 325 TABLET ORAL at 03:52

## 2025-05-27 RX ADMIN — DICYCLOMINE HYDROCHLORIDE 20 MG: 20 TABLET ORAL at 01:45

## 2025-05-27 RX ADMIN — NITROFURANTOIN MONOHYDRATE/MACROCRYSTALLINE 100 MG: 25; 75 CAPSULE ORAL at 20:22

## 2025-05-27 RX ADMIN — METHOCARBAMOL 750 MG: 750 TABLET ORAL at 14:36

## 2025-05-27 RX ADMIN — OXYCODONE AND ACETAMINOPHEN 1 TABLET: 5; 325 TABLET ORAL at 17:04

## 2025-05-27 RX ADMIN — MICONAZOLE NITRATE: 2 POWDER TOPICAL at 20:22

## 2025-05-27 RX ADMIN — CITALOPRAM HYDROBROMIDE 20 MG: 20 TABLET ORAL at 08:46

## 2025-05-27 RX ADMIN — TAMSULOSIN HYDROCHLORIDE 0.4 MG: 0.4 CAPSULE ORAL at 08:44

## 2025-05-27 RX ADMIN — NIFEDIPINE 30 MG: 30 TABLET, FILM COATED, EXTENDED RELEASE ORAL at 08:46

## 2025-05-27 RX ADMIN — DICYCLOMINE HYDROCHLORIDE 20 MG: 20 TABLET ORAL at 06:27

## 2025-05-27 RX ADMIN — ATORVASTATIN CALCIUM 40 MG: 40 TABLET, FILM COATED ORAL at 08:46

## 2025-05-27 RX ADMIN — LOSARTAN POTASSIUM 50 MG: 25 TABLET, FILM COATED ORAL at 08:45

## 2025-05-27 RX ADMIN — DONEPEZIL HYDROCHLORIDE 10 MG: 5 TABLET, FILM COATED ORAL at 20:22

## 2025-05-27 RX ADMIN — MICONAZOLE NITRATE: 2 POWDER TOPICAL at 08:47

## 2025-05-27 RX ADMIN — SODIUM CHLORIDE, PRESERVATIVE FREE 10 ML: 5 INJECTION INTRAVENOUS at 08:47

## 2025-05-27 RX ADMIN — DICYCLOMINE HYDROCHLORIDE 20 MG: 20 TABLET ORAL at 20:22

## 2025-05-27 RX ADMIN — SODIUM CHLORIDE, PRESERVATIVE FREE 10 ML: 5 INJECTION INTRAVENOUS at 20:23

## 2025-05-27 RX ADMIN — HEPARIN SODIUM 5000 UNITS: 5000 INJECTION INTRAVENOUS; SUBCUTANEOUS at 06:27

## 2025-05-27 RX ADMIN — LEVOTHYROXINE SODIUM 125 MCG: 0.12 TABLET ORAL at 06:27

## 2025-05-27 ASSESSMENT — PAIN DESCRIPTION - DESCRIPTORS
DESCRIPTORS: ACHING;DISCOMFORT
DESCRIPTORS: ACHING;SHOOTING
DESCRIPTORS: ACHING

## 2025-05-27 ASSESSMENT — PAIN DESCRIPTION - FREQUENCY
FREQUENCY: CONTINUOUS
FREQUENCY: CONTINUOUS

## 2025-05-27 ASSESSMENT — PAIN DESCRIPTION - ONSET
ONSET: ON-GOING
ONSET: GRADUAL

## 2025-05-27 ASSESSMENT — PAIN DESCRIPTION - PAIN TYPE
TYPE: CHRONIC PAIN
TYPE: CHRONIC PAIN

## 2025-05-27 ASSESSMENT — PAIN SCALES - GENERAL
PAINLEVEL_OUTOF10: 8
PAINLEVEL_OUTOF10: 5
PAINLEVEL_OUTOF10: 6
PAINLEVEL_OUTOF10: 5

## 2025-05-27 ASSESSMENT — PAIN DESCRIPTION - LOCATION
LOCATION: BACK;LEG

## 2025-05-27 ASSESSMENT — PAIN DESCRIPTION - ORIENTATION
ORIENTATION: LEFT
ORIENTATION: LEFT;RIGHT
ORIENTATION: LEFT;POSTERIOR

## 2025-05-27 ASSESSMENT — PAIN DESCRIPTION - DIRECTION: RADIATING_TOWARDS: LEG

## 2025-05-27 ASSESSMENT — PAIN - FUNCTIONAL ASSESSMENT
PAIN_FUNCTIONAL_ASSESSMENT: ACTIVITIES ARE NOT PREVENTED
PAIN_FUNCTIONAL_ASSESSMENT: PREVENTS OR INTERFERES SOME ACTIVE ACTIVITIES AND ADLS
PAIN_FUNCTIONAL_ASSESSMENT: ACTIVITIES ARE NOT PREVENTED

## 2025-05-27 NOTE — PLAN OF CARE
Problem: Chronic Conditions and Co-morbidities  Goal: Patient's chronic conditions and co-morbidity symptoms are monitored and maintained or improved  Outcome: Progressing     Problem: Discharge Planning  Goal: Discharge to home or other facility with appropriate resources  Outcome: Progressing     Problem: Pain  Goal: Verbalizes/displays adequate comfort level or baseline comfort level  5/26/2025 2213 by Mary Avalos RN  Outcome: Progressing  Pt scoring pain on 0-10 scale. Pain medications given per MAR. Pt instructed to call out when pain level increasing. Call light within reach.      Problem: Skin/Tissue Integrity  Goal: Skin integrity remains intact  Description: 1.  Monitor for areas of redness and/or skin breakdown2.  Assess vascular access sites hourly3.  Every 4-6 hours minimum:  Change oxygen saturation probe site4.  Every 4-6 hours:  If on nasal continuous positive airway pressure, respiratory therapy assess nares and determine need for appliance change or resting period  5/26/2025 2213 by Mary Avalos RN  Outcome: Progressing     Problem: ABCDS Injury Assessment  Goal: Absence of physical injury  Outcome: Progressing     Problem: Safety - Adult  Goal: Free from fall injury  Outcome: Progressing  Bed in lowest position, wheels locked, 2/4 side rails up, nonskid footwear on. Bed/ chair check alarm in place, call light within reach. Pt instructed to call out when needing assistance. Pt stated understanding.

## 2025-05-27 NOTE — PROGRESS NOTES
Speech Language Pathology  Dysphagia Treatment/ Discharge Note  Facility/Department: [facility] C5 - MED SURG/ORTHO    ST services are no longer warranted at this time. Please re consult if any changes occur or concerns arise.       Recommendations:  Solid Consistency: IDDSI 7 Regular Solids  Liquid Consistency: IDDSI 0 Thin Liquids  Medication: Meds PO as tolerated    Risk Management: upright for all intake, stay upright for at least 30 mins after intake, small bites/sips, slow rate of intake, general GERD precautions, and general aspiration precautions.     NAME:Courtney Taylor  : 1940 (85 y.o.)   MRN: [id number]  ROOM: [facility]/0532-01  ADMISSION DATE: 2025  PATIENT DIAGNOSIS(ES): Dehydration [E86.0]  Acute hyponatremia [E87.1]  Dysarthria [R47.1]  Other chronic pain [G89.29]  TIANA (acute kidney injury) [N17.9]  Acute kidney injury [N17.9]  Nonintractable headache, unspecified chronicity pattern, unspecified headache type [R51.9]  Allergies   Allergen Reactions    Morphine Shortness Of Breath    Ceftriaxone Other (See Comments)    Levaquin [Levofloxacin] Other (See Comments)     hallucinations    Lyrica [Pregabalin] Swelling    Tape [Adhesive Tape] Other (See Comments)     BLISTER    Piperacillin Sod-Tazobactam So Itching and Rash    Sulfa Antibiotics Rash    Zyvox [Linezolid] Nausea And Vomiting       DATE ONSET: 2025    Pain: The patient does not complain of pain       Current Diet: ADULT DIET; Regular; 4 carb choices (60 gm/meal)      Diet Tolerance:  Patient tolerating current diet level without signs/symptoms of aspiration.     Dysphagia Treatment and Impressions:  Subjective: Pt seen in room at bedside with RN permission   Behavior / Cognition: Pleasant and agreeable to tx.  RN Report/Chart Review: No swallowing concerns   Patient tolerance: Pt tolerated trials     Baseline Respiratory Status Measures: Pt with SPO2% of 95 on RA  with RR of 16    Liquid PO Trials:    IDDSI 0 Thin:  Assessed via tsp  GERD precautions, and general aspiration precautions.     Education: SLP edu pt re: Role of SLP, Rationale for dysphagia tx, Recommended compensatory strategies, Aspiration precautions, and Importance of oral care to reduce adverse affects in the event of aspiration. Pt verbalized understanding and RN aware of recommendations           Plan:    DC from  services     Discharge Recommendations: No further SLP tx warranted at discharge      Total Treatment Time / Charges     Time in Time out Total Time / units   Cognitive Tx         Speech Tx      Dysphagia Tx 0939 0950 11 mins/ 1 unit     Signature:  Ashley Winkler M.A., CCC-SLP  Speech-Language Pathologist  [id number]

## 2025-05-27 NOTE — PROGRESS NOTES
[facility] Medicine Progress Note  V 5.1      Date of Admission: 5/21/2025    Hospital Day: 7      Chief Admission Complaint:  Headache    Subjective:  Emr and notes reviewed pt in bed. Appears in pain, non verbal, will not open eyes, fists held tight. No resp distress. Low grade fever this am       Presenting Admission History:       85 y.o. female who presented to [facility] with headache.  PMHx significant for diabetes, previous TIANA/renal failure, chronic back and left hip pain, hard of hearing, fibromyalgia, anxiety, thyroid cancer, diabetes.  She reports headache for 2 days with bodyaches.  No fevers or chills.  Family states that patient has had renal injury and hyponatremia in the past.  Patient lives at a nursing home (possibly [facility]) and family is unaware of her p.o. intake.  Patient's left hip pain and body pain has been chronic for years.  Per grandchildren, patient was also having slurred speech and her face was sagging at nursing home.  No other known weakness or deficits in all this seems to have resolved.  Patient has had hypotension in the ED.  Blood work in the ED significant for hemoglobin 10.7, sodium 128, creatinine 3.5, GFR 12.  Chest x-ray showed no acute abnormality.  CT head showed no acute abnormality.  Patient was given 2 fluid boluses.  Will admit patient for dehydration, TIANA, hyponatremia, CVA workup.     Assessment/Plan:       TIANA - with elevated BUN/Cr ratio, likely secondary to pre-renal azotemia.  Volume expansion given. Nephrology following. Holding Lasix, Jardiance, Losartan and Nifedipine. Will trial off IVF hydration. Monitor BMP.   - crt has normalized   -good uo     Hyponatremia  - Likely secondary to decreased volume status from decreased p.o. intake  - Urine sodium, urine osmolality, serum osmolality pending  - Nephrology following  - appears resolved     HypoKalemia - etiology clinically unable to determine.  Followed serial Potassium,  inhalers on home medication list  - Now on 2 L without any baseline home O2 use; wean as tolerated  - Possibly secondary to dehydration and deconditioning  - Chest x-ray showed no acute abnormality  - DuoNebs scheduled  - resolved on RA     Diabetes  - Last A1c on 5/12/2025 was 5.6  - LDSSI  - Hypoglycemia protocol     Hyperlipidemia  - Home statin dose increased to atorvastatin 40 mg     Hypothyroidism  - Continue thyroxine 125 mcg daily     Chronic pain  Fibromyalgia  - Continue home Robaxin 750 milligrams 3 times daily, gabapentin 800 mg 3 times daily     Hypertension  - Hold home Lopressor 25 mg daily, Lasix 20 mg every other day, losartan 50 mg daily, nifedipine 30 mg daily due to hypotension and CVA workup  - adding meds back in as BP allows   - 5/25 BP on the higher side - defer to Nephrology, added prns      Mood disorder  - Continue Celexa 20 mg daily     Dementia  - Continue home donepezil 10 mg daily and memantine 10 mg daily    Debility deconditioning: PT/OT recs for SNF     Chronic pain: pt reports she has fibromyalgia, c/o pain all over.   - cont prn pain meds, need to consider long term mgt     Anxiety: pt is tearful and anxious will trial hydroxyzine     Ongoing threat to life and/or bodily function without ongoing treatment due to:  TIANA    Goals and plans of care: given multiple comorbidity, will ask Palliative Care to see for goals of care        Consults:      IP CONSULT TO NEPHROLOGY  IP CONSULT TO SPIRITUAL SERVICES  IP CONSULT TO PALLIATIVE CARE      The following subspecialty service(s) Nephrology was/were consulted and any/all available notes from yesterday and today were reviewed on 5/27/2025, w/ plan for continued inpatient w/up and management as noted above in the assessment and plan     --------------------------------------------------      Medications:        Infusion Medications    sodium chloride 75 mL/hr at 05/26/25 2241    dextrose      sodium chloride 25 mL/hr at 05/23/25 2005  gm/meal)    DVT Prophylaxis: SQ Heparin    Code status: Full Code    PT/OT Eval Status: Seen w/ recs for SNF    Multi-Disciplinary Rounds with Case Management completed on 5/27/2025 with the following recs:     Anticipated Discharge Location: Critical access hospital     Anticipated Discharge Day/Date:  1-2 days     Barriers to Discharge: Clinical Course and Subspecialty recs    Likely rate limiting factor: TIANA has improved but her PO intake remains limited today. Will monitor 24H for stability of renal function off fluids and with PO intake prior to returning to [facility].       --------------------------------------------------    MDM (any 2 required for High level billing)    A. Problems (any 1)  [x] Acute/Chronic Illness/injury posing ongoing threat to life and/or bodily function without ongoing treatment    [] Severe exacerbation of chronic illness    --------------------------------------------------  B. Risk of Treatment (any 1)    [] Drugs/treatments that require intensive monitoring for toxicity    [] IV ABX (Vancomycin, Aminoglycosides, etc)     [] Post-Cath/Contrast study requiring serial monitoring    [] IV Narcotic analgesia    [] Aggressive IV diuresis    [] Hypertonic Saline    [] Critical electrolyte abnormalities requiring IV replacement    [] Insulin - Scheduled/SSI or Insulin gtt    [] Anticoagulation (Heparin gtt or Coumadin - other anticoagulants in special circumstances)    [] Cardiac Medications (IV Amiodarone/Diltiazem, Tikosyn, etc)    [] Hemodialysis    [] Other -    [] Change in code status    [] Decision to escalate care    [] Major surgery/procedure with associated risk factors    --------------------------------------------------  C. Data (any 2)    [x] Data Review (any 3)    [x] Consultant notes from yesterday/today    [x] All available current labs reviewed interpreted for clinical significance    [x] Appropriate follow-up labs were ordered  [] Collateral history obtained     [x]

## 2025-05-27 NOTE — CARE COORDINATION
CM update; LOS # 5; Pre-cert for EGS is back and approved. Will follow.Jesica Castro RN     no dysuria, no frequency, and no hematuria.

## 2025-05-27 NOTE — ACP (ADVANCE CARE PLANNING)
Advance Care Planning     Advance Care Planning Inpatient Note  [facility] Department    Today's Date: 5/27/2025  Unit: [facility] C5 - MED SURG/ORTHO    Received request from HealthCare Provider.  Upon review of chart and communication with care team, patient's decision making abilities are not in question.. Patient was/were present in the room during visit.    Goals of ACP Conversation:  Discuss advance care planning documents    Health Care Decision Makers:       Primary Decision Maker: Thalia Hoffman - Child - [phone]    Secondary Decision Maker: BrandonSharanDarren - Child - [phone]  Summary:  Documented Next of Kin, per patient report  Courtney indicated her children live \"out of state\" but along with her brother, could make decisions for her if/when she is not able to make healthcare decisions.   Advance Care Planning Documents (Patient Wishes):  None  Courtney declined further ACP conversation this afternoon. She indicated her children and her brother could make healthcare decisions if/when needed.       Assessment:  Christian engaged in conversation as she sat in chair. Courtney indicated she did not want further ACP conversation or to review documents at time of encounter.     Interventions:  Patient DECLINED ACP conversation    Care Preferences Communicated:   No    Outcomes/Plan:  ACP Discussion: Refused  Courtney declined conversation about ACP documentation. She shared her children and her brother could make healthcare decisions if/when needed.     Electronically signed by Chaplain JHON on 5/27/2025 at 3:14 PM             No

## 2025-05-27 NOTE — CONSULTS
Consult Call Back    Who:Basilia Eid, APRN - CNP   Date:5/27/2025,  Time:2:28 PM    Electronically signed by Ca Gan on 5/27/25 at 2:28 PM EDT

## 2025-05-27 NOTE — CONSULTS
PALLIATIVE MEDICINE CONSULTATION     Patient name:Courtney Taylor   MRN:[id number]    :1940  Room/Bed:0532/0532-01   LOS: 5 days         Date of consult:2025    Inpatient consult to Palliative Care  Consult performed by: Basilia Eid APRN - CNP  Consult ordered by: Mariel Mohamud APRN - CNP          Consult ordered for: goals of care    ASSESSMENT/RECOMMENDATIONS     85 y.o. female with chronic pain, severe lumbar stenosis, fibromyalgia, DM, HFpEF, and dementia who lives alone in a senior apartment at baseline. Patient has been at SNF after recent hospital admission for intractable back pain.  Baseline PPS unclear.  Weight appears stable  x 1 year. BMI 44,albumin 3.0.  Patient admitted for UTI, poor PO intake. PT/OT recs for SNF.  Patient has been confused and is unable to contribute.    Number of ED visits and hospital admissions in past 12 months:  2  Last hospitalization: 5/10/25      Goals of Care:  Legal decision maker unclear, goals TBD.  Daughter favors goals to optimize with comfort focus.  Awaiting call back from potential HPOA who reportedly may have entirely different goals.  Code status is Full Code    Recommendations:   Determine appropriate decision maker (unless patient's mental status improves and she is able to contribute).  GOC and code status discussion      Disposition/Discharge Plan:   -V/M left for son, Darren.  If Darren is not HPOA, will set up conference call with the other siblings to discuss GOC and code status.  As long as patient lacks capacity, either the HPOA (if one exists) or the 3 kids collectively will be her decision maker            Patient/Family Goals of Care :    25    Patient is not talking and appears confused.  No family at bedside.       Called daughter, Thalia.  Explained the role of palliative care.  Thalia agreeable to discussion with me today.   Thalia is not sure if her brother, Darern, is HPOA or not.  She does know he is  Extensive disease; Total care; Mouth care only; Drowsy/coma   []  0%   Death       Home med list and hospital medications reviewed in chart as of 5/27/2025     EXAM     Vitals:    05/27/25 0830   BP: (!) 169/75   Pulse: 90   Resp: 16   Temp: 99 °F (37.2 °C)   SpO2: 94%                  OBJECTIVE   BP (!) 169/75   Pulse 90   Temp 99 °F (37.2 °C) (Oral)   Resp 16   Ht 1.524 m (5')   Wt 102.3 kg (225 lb 8.5 oz)   SpO2 94%   BMI 44.05 kg/m²   I/O last 3 completed shifts:  In: 220 [P.O.:220]  Out: 4525 [Urine:4525]  I/O this shift:  In: 10 [I.V.:10]  Out: -       Palliative Medicine Interventions:    patient/family support  Goals of Care discussions with patient/surrogate  Spiritual Interventions:  none        DATA:  Current labs in the epic chart reviewed as of 5/27/2025   Review of previous notes, admits, labs, radiology and testing relevant to this consult done in this chart today 5/27/2025    Medical Decision Making:  The following items were considered in medical decision making:  Review of prior external note(s) from each unique source relevant to today's visit: Hospitalist, Case management, PT/OT/ST, nephrology  Discussion of management or test with external physician/qualified health care professional: Hospitalist, Case management,    Unique test results reviewed: CBC and BMP, Liver Studies, Cardiac studies, echo, MRI B, CT H       Risk of Complications/Morbidity: High   Illness(es)/ Infection present that pose threat to bodily function.   There is potential for severe exacerbation of condition/side effects of treatment.  Therapy requires intensive monitoring for toxicity        Palliative Medicine Provider (MD/NP)  Advance Care Planning (ACP) Conversation      Date of Conversation: 05/27/25    The patient and/or authorized decision maker consented to a voluntary Advance Care Planning conversation.   Decisional Capacity: Limited  Individuals present for the conversation:   Daughter, Thalia  Other persons

## 2025-05-27 NOTE — PLAN OF CARE
Problem: Chronic Conditions and Co-morbidities  Goal: Patient's chronic conditions and co-morbidity symptoms are monitored and maintained or improved  Outcome: Progressing     Problem: Discharge Planning  Goal: Discharge to home or other facility with appropriate resources  Outcome: Progressing     Problem: Pain  Goal: Verbalizes/displays adequate comfort level or baseline comfort level  Outcome: Progressing     Problem: Skin/Tissue Integrity  Goal: Skin integrity remains intact  Description: 1.  Monitor for areas of redness and/or skin breakdown2.  Assess vascular access sites hourly3.  Every 4-6 hours minimum:  Change oxygen saturation probe site4.  Every 4-6 hours:  If on nasal continuous positive airway pressure, respiratory therapy assess nares and determine need for appliance change or resting period  Outcome: Progressing  Flowsheets (Taken 5/27/2025 1801)  Skin Integrity Remains Intact:   Monitor for areas of redness and/or skin breakdown   Turn and reposition as indicated     Problem: ABCDS Injury Assessment  Goal: Absence of physical injury  Outcome: Progressing     Problem: Safety - Adult  Goal: Free from fall injury  Outcome: Progressing

## 2025-05-27 NOTE — PROGRESS NOTES
Physical Therapy  Facility/Department: [facility] C5 - MED SURG/ORTHO  Daily Treatment Note  NAME: Courtney Taylor  : 1940  MRN: [id number]    Date of Service: 2025    Discharge Recommendations:  Subacute/Skilled Nursing Facility   PT Equipment Recommendations  Equipment Needed: No    Patient Diagnosis(es): The primary encounter diagnosis was TIANA (acute kidney injury). Diagnoses of Acute hyponatremia, Nonintractable headache, unspecified chronicity pattern, unspecified headache type, Other chronic pain, Dehydration, and Dysarthria were also pertinent to this visit.    Assessment  Assessment: Pt performed bed mobility with mod A and two sit<>stand transfers with min A. Session limited by pt c/o pain (RN made aware), pt limited by decreased functional mobility, strength, endurance, ROM, and balance. Pt would benefit from skilled PT in this setting to address the above deficits and recommend return to SNF at discharge.  Activity Tolerance: Patient limited by pain;Patient limited by endurance  Equipment Needed: No    Plan  Physical Therapy Plan  General Plan: 3-5 times per week  Current Treatment Recommendations: Strengthening;ROM;Balance training;Functional mobility training;Transfer training;ADL/Self-care training;Endurance training;Gait training;Safety education & training;Equipment evaluation, education, & procurement;Therapeutic activities    Restrictions  Restrictions/Precautions  Restrictions/Precautions: Fall Risk, Contact Precautions  Activity Level: Up with Assist  Required Braces or Orthoses?: No  Position Activity Restriction  Other Position/Activity Restrictions: tele, IV     Subjective   Subjective  Subjective: Pt agrees to PT session  Pain: BLE pain unrated; RN called about possible med round    Objective  Vitals  Pulse: 71  BP: (!) 166/74  BP Location: Right lower arm  MAP (Calculated): 105  SpO2: 94 %  O2 Device: None (Room air)  Bed Mobility Training  Bed Mobility Training: Yes  Interventions:

## 2025-05-28 LAB
GLUCOSE BLD-MCNC: 157 MG/DL (ref 70–99)
GLUCOSE BLD-MCNC: 91 MG/DL (ref 70–99)
GLUCOSE BLD-MCNC: 93 MG/DL (ref 70–99)
GLUCOSE BLD-MCNC: 96 MG/DL (ref 70–99)
PERFORMED ON: ABNORMAL
PERFORMED ON: NORMAL

## 2025-05-28 PROCEDURE — 6360000002 HC RX W HCPCS

## 2025-05-28 PROCEDURE — 2500000003 HC RX 250 WO HCPCS

## 2025-05-28 PROCEDURE — 6370000000 HC RX 637 (ALT 250 FOR IP): Performed by: NURSE PRACTITIONER

## 2025-05-28 PROCEDURE — 6370000000 HC RX 637 (ALT 250 FOR IP)

## 2025-05-28 PROCEDURE — 51702 INSERT TEMP BLADDER CATH: CPT

## 2025-05-28 PROCEDURE — 6370000000 HC RX 637 (ALT 250 FOR IP): Performed by: INTERNAL MEDICINE

## 2025-05-28 PROCEDURE — 1200000000 HC SEMI PRIVATE

## 2025-05-28 PROCEDURE — 2500000003 HC RX 250 WO HCPCS: Performed by: NURSE PRACTITIONER

## 2025-05-28 RX ADMIN — DICYCLOMINE HYDROCHLORIDE 20 MG: 20 TABLET ORAL at 10:17

## 2025-05-28 RX ADMIN — MICONAZOLE NITRATE: 2 POWDER TOPICAL at 10:14

## 2025-05-28 RX ADMIN — DICYCLOMINE HYDROCHLORIDE 20 MG: 20 TABLET ORAL at 20:03

## 2025-05-28 RX ADMIN — LOSARTAN POTASSIUM 50 MG: 25 TABLET, FILM COATED ORAL at 10:16

## 2025-05-28 RX ADMIN — METHOCARBAMOL 750 MG: 750 TABLET ORAL at 16:01

## 2025-05-28 RX ADMIN — CITALOPRAM HYDROBROMIDE 20 MG: 20 TABLET ORAL at 10:17

## 2025-05-28 RX ADMIN — HEPARIN SODIUM 5000 UNITS: 5000 INJECTION INTRAVENOUS; SUBCUTANEOUS at 06:21

## 2025-05-28 RX ADMIN — DICYCLOMINE HYDROCHLORIDE 20 MG: 20 TABLET ORAL at 06:21

## 2025-05-28 RX ADMIN — OXYCODONE AND ACETAMINOPHEN 1 TABLET: 5; 325 TABLET ORAL at 20:03

## 2025-05-28 RX ADMIN — MICONAZOLE NITRATE: 2 POWDER TOPICAL at 20:03

## 2025-05-28 RX ADMIN — ATORVASTATIN CALCIUM 40 MG: 40 TABLET, FILM COATED ORAL at 10:17

## 2025-05-28 RX ADMIN — PANTOPRAZOLE SODIUM 40 MG: 40 TABLET, DELAYED RELEASE ORAL at 06:21

## 2025-05-28 RX ADMIN — GABAPENTIN 300 MG: 300 CAPSULE ORAL at 10:39

## 2025-05-28 RX ADMIN — NITROFURANTOIN MONOHYDRATE/MACROCRYSTALLINE 100 MG: 25; 75 CAPSULE ORAL at 10:52

## 2025-05-28 RX ADMIN — OXYCODONE AND ACETAMINOPHEN 1 TABLET: 5; 325 TABLET ORAL at 03:55

## 2025-05-28 RX ADMIN — METHOCARBAMOL 750 MG: 750 TABLET ORAL at 10:17

## 2025-05-28 RX ADMIN — DONEPEZIL HYDROCHLORIDE 10 MG: 5 TABLET, FILM COATED ORAL at 20:03

## 2025-05-28 RX ADMIN — NIFEDIPINE 30 MG: 30 TABLET, FILM COATED, EXTENDED RELEASE ORAL at 10:17

## 2025-05-28 RX ADMIN — METOPROLOL TARTRATE 50 MG: 50 TABLET, FILM COATED ORAL at 10:17

## 2025-05-28 RX ADMIN — SODIUM CHLORIDE, PRESERVATIVE FREE 10 ML: 5 INJECTION INTRAVENOUS at 10:20

## 2025-05-28 RX ADMIN — LEVOTHYROXINE SODIUM 125 MCG: 0.12 TABLET ORAL at 06:21

## 2025-05-28 RX ADMIN — OXYCODONE AND ACETAMINOPHEN 1 TABLET: 5; 325 TABLET ORAL at 16:01

## 2025-05-28 RX ADMIN — MEMANTINE 5 MG: 5 TABLET ORAL at 10:17

## 2025-05-28 RX ADMIN — DICYCLOMINE HYDROCHLORIDE 20 MG: 20 TABLET ORAL at 17:43

## 2025-05-28 RX ADMIN — ASPIRIN 81 MG: 81 TABLET, COATED ORAL at 10:16

## 2025-05-28 RX ADMIN — NITROFURANTOIN MONOHYDRATE/MACROCRYSTALLINE 100 MG: 25; 75 CAPSULE ORAL at 20:03

## 2025-05-28 RX ADMIN — TAMSULOSIN HYDROCHLORIDE 0.4 MG: 0.4 CAPSULE ORAL at 10:16

## 2025-05-28 RX ADMIN — METHOCARBAMOL 750 MG: 750 TABLET ORAL at 20:03

## 2025-05-28 RX ADMIN — HEPARIN SODIUM 5000 UNITS: 5000 INJECTION INTRAVENOUS; SUBCUTANEOUS at 16:01

## 2025-05-28 RX ADMIN — OXYCODONE AND ACETAMINOPHEN 1 TABLET: 5; 325 TABLET ORAL at 10:16

## 2025-05-28 ASSESSMENT — PAIN DESCRIPTION - ORIENTATION
ORIENTATION: LEFT;RIGHT
ORIENTATION: RIGHT;LEFT

## 2025-05-28 ASSESSMENT — PAIN DESCRIPTION - LOCATION
LOCATION: LEG
LOCATION: SHOULDER
LOCATION: SHOULDER
LOCATION: LEG
LOCATION: SHOULDER

## 2025-05-28 ASSESSMENT — PAIN DESCRIPTION - DESCRIPTORS
DESCRIPTORS: ACHING;DISCOMFORT

## 2025-05-28 ASSESSMENT — PAIN DESCRIPTION - FREQUENCY
FREQUENCY: CONTINUOUS

## 2025-05-28 ASSESSMENT — PAIN DESCRIPTION - ONSET
ONSET: ON-GOING

## 2025-05-28 ASSESSMENT — PAIN DESCRIPTION - PAIN TYPE
TYPE: ACUTE PAIN
TYPE: CHRONIC PAIN
TYPE: ACUTE PAIN

## 2025-05-28 ASSESSMENT — PAIN SCALES - GENERAL
PAINLEVEL_OUTOF10: 7
PAINLEVEL_OUTOF10: 10
PAINLEVEL_OUTOF10: 7
PAINLEVEL_OUTOF10: 8
PAINLEVEL_OUTOF10: 10

## 2025-05-28 ASSESSMENT — PAIN - FUNCTIONAL ASSESSMENT: PAIN_FUNCTIONAL_ASSESSMENT: PREVENTS OR INTERFERES WITH MANY ACTIVE NOT PASSIVE ACTIVITIES

## 2025-05-28 NOTE — PROGRESS NOTES
[facility] Medicine Progress Note  V 5.1      Date of Admission: 5/21/2025    Hospital Day: 8      Chief Admission Complaint:  Headache    Subjective:  Emr and notes reviewed pt in bed. C/o of pain. No chest pain or sob.       Presenting Admission History:       85 y.o. female who presented to [facility] with headache.  PMHx significant for diabetes, previous TIANA/renal failure, chronic back and left hip pain, hard of hearing, fibromyalgia, anxiety, thyroid cancer, diabetes.  She reports headache for 2 days with bodyaches.  No fevers or chills.  Family states that patient has had renal injury and hyponatremia in the past.  Patient lives at a nursing home (possibly [facility] ([place], [place] and family is unaware of her p.o. intake.  Patient's left hip pain and body pain has been chronic for years.  Per grandchildren, patient was also having slurred speech and her face was sagging at nursing home.  No other known weakness or deficits in all this seems to have resolved.  Patient has had hypotension in the ED.  Blood work in the ED significant for hemoglobin 10.7, sodium 128, creatinine 3.5, GFR 12.  Chest x-ray showed no acute abnormality.  CT head showed no acute abnormality.  Patient was given 2 fluid boluses.  Will admit patient for dehydration, TIANA, hyponatremia, CVA workup.     Assessment/Plan:       TIANA - with elevated BUN/Cr ratio, likely secondary to pre-renal azotemia.  Volume expansion given. Nephrology following. Holding Lasix, Jardiance, Losartan and Nifedipine. Will trial off IVF hydration. Monitor BMP.   - crt has normalized   -good uo     Hyponatremia  - Likely secondary to decreased volume status from decreased p.o. intake  - Urine sodium, urine osmolality, serum osmolality pending  - Nephrology following  - appears resolved     HypoKalemia - etiology clinically unable to determine.  Followed serial Potassium, personally reviewed and documented in this note. Replaced PRN.        HypoMagnesemia - etiology clinically unable to determine.  Followed serial Magnesium, personally reviewed and documented in this note. Replaced PRN.         Acute Cystitis: Admission UA showed moderate pyuria. Her UCx finalized 5/23 PM and showed MDR E.coli. She unfortunately has multiple Abx allergies listed without specific details. I have scoured available records in [employer] and [employer] and do not see any event leading to the Ceftriaxone allergy, aside from being documented by her new PCP 4/4/24. The patient is unable to provide details. Will either need to continue Merrem for short course or we will need to see if her family can provide clarification. Merrem was started for now.   - 5/25 sensitive to Macrobid and not a listed allergy, will stop Merrem and trial Macrobid   - stop jardiance in setting of UTI   - 5/25 now with urinary retention needed to be ISC X 2 overnight. Bladder scanning again today, granda replaced     Hypotension  - Likely secondary to decreased volume status from decreased p.o. intake as well as possible UTI.  - Improving  - Hold home BP meds  - 5/24 BP up will resume meds   - 5/26 BP now HTN, renal adding and adjusting       Headache  - Resolved  - Likely secondary to dehydration     Dysarthria  Facial droop  - Resolved upon exam  - CT head unremarkable  - Continue aspirin 81 mg  - Home statin dose increased to atorvastatin 40 mg  - Last A1c on 5/12/2025 was 5.6  - Lipid panel   Lab Results   Component Value Date    CHOL 141 05/22/2025    TRIG 83 05/22/2025    HDL 67 (H) 05/22/2025    LDL 57 05/22/2025    VLDL 17 05/22/2025      - MRI brain is negative  - Echo showed normal LVEF  - PT OT SLP consulted    Shortness of breath, hypoxia; Acute respiratory failure ruled out after further study.   - Unclear whether there is diagnosis of COPD but possible asthma given inhalers on home medication list  - Now on 2 L without any baseline home O2 use; wean as tolerated  - Possibly secondary to  Rounds with Case Management completed on 5/28/2025 with the following recs:     Anticipated Discharge Location: [facility]     Anticipated Discharge Day/Date:  5/28, if BP stable     Barriers to Discharge: Clinical Course and Subspecialty recs        --------------------------------------------------    MDM (any 2 required for High level billing)    A. Problems (any 1)  [x] Acute/Chronic Illness/injury posing ongoing threat to life and/or bodily function without ongoing treatment    [] Severe exacerbation of chronic illness    --------------------------------------------------  B. Risk of Treatment (any 1)    [] Drugs/treatments that require intensive monitoring for toxicity    [] IV ABX (Vancomycin, Aminoglycosides, etc)     [] Post-Cath/Contrast study requiring serial monitoring    [] IV Narcotic analgesia    [] Aggressive IV diuresis    [] Hypertonic Saline    [] Critical electrolyte abnormalities requiring IV replacement    [] Insulin - Scheduled/SSI or Insulin gtt    [] Anticoagulation (Heparin gtt or Coumadin - other anticoagulants in special circumstances)    [] Cardiac Medications (IV Amiodarone/Diltiazem, Tikosyn, etc)    [] Hemodialysis    [] Other -    [] Change in code status    [] Decision to escalate care    [] Major surgery/procedure with associated risk factors    --------------------------------------------------  C. Data (any 2)    [x] Data Review (any 3)    [x] Consultant notes from yesterday/today    [x] All available current labs reviewed interpreted for clinical significance    [x] Appropriate follow-up labs were ordered  [] Collateral history obtained     [x] Independent Interpretation of tests (any 1)    [x] Telemetry (Rhythm Strip) personally reviewed and interpreted        [] Imaging personally reviewed and interpreted     [x] Discussion (any 1)  [x] Multi-Disciplinary Rounds with Case Management  [] Discussed management of the case with           Labs:  Personally reviewed

## 2025-05-28 NOTE — DISCHARGE INSTRUCTIONS
[facility]  [phone]    Additional Pain Management Services Offered by this provider:  Behavior treatment biofeedback  Psychiatric care  Relaxation training  Wellness training  Aquatic therapy    Please call [phone] to discuss referral and set up appointment.

## 2025-05-28 NOTE — PLAN OF CARE
Problem: Chronic Conditions and Co-morbidities  Goal: Patient's chronic conditions and co-morbidity symptoms are monitored and maintained or improved  Outcome: Progressing  Flowsheets  Taken 5/28/2025 1017 by Harper Gallego RN  Care Plan - Patient's Chronic Conditions and Co-Morbidity Symptoms are Monitored and Maintained or Improved:   Monitor and assess patient's chronic conditions and comorbid symptoms for stability, deterioration, or improvement   Collaborate with multidisciplinary team to address chronic and comorbid conditions and prevent exacerbation or deterioration  Taken 5/27/2025 2250 by Alex Olson LPN  Care Plan - Patient's Chronic Conditions and Co-Morbidity Symptoms are Monitored and Maintained or Improved:   Monitor and assess patient's chronic conditions and comorbid symptoms for stability, deterioration, or improvement   Collaborate with multidisciplinary team to address chronic and comorbid conditions and prevent exacerbation or deterioration   Update acute care plan with appropriate goals if chronic or comorbid symptoms are exacerbated and prevent overall improvement and discharge     Problem: Discharge Planning  Goal: Discharge to home or other facility with appropriate resources  Outcome: Progressing  Flowsheets  Taken 5/28/2025 1017 by Harper Gallego RN  Discharge to home or other facility with appropriate resources:   Identify barriers to discharge with patient and caregiver   Arrange for needed discharge resources and transportation as appropriate   Identify discharge learning needs (meds, wound care, etc)  Taken 5/27/2025 2250 by Alex Olson LPN  Discharge to home or other facility with appropriate resources:   Identify barriers to discharge with patient and caregiver   Arrange for needed discharge resources and transportation as appropriate   Identify discharge learning needs (meds, wound care, etc)     Problem: Pain  Goal: Verbalizes/displays adequate comfort level or

## 2025-05-28 NOTE — DISCHARGE INSTR - COC
Continuity of Care Form    Patient Name: Courtney Taylor   :  1940  MRN:  [id number]    Admit date:  2025  Discharge date:  25    Code Status Order: Full Code   Advance Directives:     Admitting Physician:  Colten Blackwell MD  PCP: Noa Cardoza PA    Discharging Nurse: Monique Saez RN  Discharging Hospital Unit/Room#: 0532/0532-01  Discharging Unit Phone Number: [phone]    Emergency Contact:   Extended Emergency Contact Information  Primary Emergency Contact: Thalia Hoffman  Home Phone: [phone]  Mobile Phone: [phone]  Relation: Child  Secondary Emergency Contact: Catrachita Westfall  Home Phone: [phone]  Mobile Phone: [phone]  Relation: Other    Past Surgical History:  Past Surgical History:   Procedure Laterality Date    ABDOMEN SURGERY      Lap Band    ANKLE SURGERY  2012    Incision, irrigation and debridement right ankle    ANKLE SURGERY  12    incision and drainage of right ankle    ANKLE SURGERY  12    incision and drainage right foot.    ANKLE SURGERY Right 10/10/2013    INCISION AND DEBRIDEMENT WITH REMOVAL OF PROSTHESIS RIGHT ANKLE WITH INSERTION OF ANTIBIOTIC SPACER    ANKLE SURGERY Right 13    RIGHT ANKLE REMOVAL OF ANTIBIOTIC BEADS, TIBIOTALOCALCANEAL FUSION , PLATELET RICH PLASMA INJECTION AND FEMORAL HEAD ALLOGRAFT WITH MINI C ARM BIOMET    BACK SURGERY      Cervical sx    COLONOSCOPY      DEBRIDEMENT  12    incision and debridement right ankle with application of Acell graft and block for pain control    ENDOSCOPY, COLON, DIAGNOSTIC      JOINT REPLACEMENT      Bilat knees    OTHER SURGICAL HISTORY  2012    INCISION AND DEBRIDEMENT RIGHT ANKLE WITH APPLICATION OF WOUND VAC    OTHER SURGICAL HISTORY  05/10/2012    i & d right ankle    OTHER SURGICAL HISTORY  12    incsion and debridement right ankle with placement of wound vac    OTHER SURGICAL HISTORY  2012    REPEAT DEBRIDEMENT WITH IRRIGATION AND DRAINAGE RIGHT  Treatments After Discharge:    SNF, PT/OT   - monitor UR had granda and was DC voiding w/o issue on DC.   - monitor BP, and meds as lasix was DC due to renal function   - CBC and BMP X 1 week. Was seen by Neprhology consider OP follow up                Physician Certification: I certify the above information and transfer of Courtney Taylor  is necessary for the continuing treatment of the diagnosis listed and that she requires Skilled Nursing Facility for less 30 days.     Update Admission H&P: No change in H&P    PHYSICIAN SIGNATURE:  Electronically signed by NESSA Chawla CNP on 5/29/25 at 4:37 PM EDT/ Dr Kosta Glaser

## 2025-05-28 NOTE — PROGRESS NOTES
PALLIATIVE MEDICINE PROGRESS NOTE     Patient name:Courtney Taylor    MRN:[id number] :1940  Room/Bed:0532/0532-01    LOS: 6 days        ASSESSMENT/RECOMMENDATIONS     85 y.o. female with chronic pain, severe lumbar stenosis, fibromyalgia, DM, HFpEF, and dementia who lives alone in a senior apartment at baseline. Patient has been at SNF after recent hospital admission for intractable back pain.  Baseline PPS unclear.  Weight appears stable  x 1 year. BMI 44,albumin 3.0.  Patient admitted for UTI, poor PO intake. PT/OT recs for SNF.     Goals of care:  Seem to be for longevity balanced with pain control.  Acceptable pain level is a 3-5/10.   Patient hoping to go to SNF, regain mobility, and then move to [place] to live with son.  Unclear goals beyond this at current time.  She is currently full code, but is considering potential revision to this preference.      Recommendations:   Goals of care discussions should be revisited with each hospital admission, with a significant change in treatment plan, or upon further decline in health, function, or quality of life.      Community palliative care referral for continued goals of care discussions, advanced care planning, disease optimization and education, and symptom management     Disposition/Discharge Plan:    - GOC discussion initiated and will need to be further developed  - Called [facility] (outpatient). Confirmed they offer psychotherapy at the pain practice, but unfortunately do not offer telemedicine services.  They advise referrals typically received from the PCP.  Patient can self refer, but they will need (and will request) supporting info from PCP before being seen.  Unable to place AMB order in EPIC at this time.  Advised patient/son the Pain Management Psychotherapy contact info will be on the discharge instructions (Entered).    Patient/Family Goals of Care :    52/    Met with patient at the bedside.  She is alert and oriented x4 and

## 2025-05-28 NOTE — CARE COORDINATION
CM update; LOS # 6: Followed by IM, Nephrology, Palliative Care and therapies. Patient remains full code. Patient has planes to rehab then go to [place] to live with son after she is stronger. Patient has pre-cert good for [facility]  from today through 6/1/2025. Will follow.Jesica Castro RN

## 2025-05-28 NOTE — PLAN OF CARE
Problem: Chronic Conditions and Co-morbidities  Goal: Patient's chronic conditions and co-morbidity symptoms are monitored and maintained or improved  5/28/2025 1916 by Alex Olson LPN  Outcome: Progressing  5/28/2025 1226 by Harper Gallego RN  Outcome: Progressing  Flowsheets  Taken 5/28/2025 1017 by Harper Gallego RN  Care Plan - Patient's Chronic Conditions and Co-Morbidity Symptoms are Monitored and Maintained or Improved:   Monitor and assess patient's chronic conditions and comorbid symptoms for stability, deterioration, or improvement   Collaborate with multidisciplinary team to address chronic and comorbid conditions and prevent exacerbation or deterioration  Taken 5/27/2025 2250 by Alex Olson LPN  Care Plan - Patient's Chronic Conditions and Co-Morbidity Symptoms are Monitored and Maintained or Improved:   Monitor and assess patient's chronic conditions and comorbid symptoms for stability, deterioration, or improvement   Collaborate with multidisciplinary team to address chronic and comorbid conditions and prevent exacerbation or deterioration   Update acute care plan with appropriate goals if chronic or comorbid symptoms are exacerbated and prevent overall improvement and discharge     Problem: Discharge Planning  Goal: Discharge to home or other facility with appropriate resources  5/28/2025 1916 by Alex Olson LPN  Outcome: Progressing  5/28/2025 1226 by Harper Gallego RN  Outcome: Progressing  Flowsheets  Taken 5/28/2025 1017 by Harper Gallego RN  Discharge to home or other facility with appropriate resources:   Identify barriers to discharge with patient and caregiver   Arrange for needed discharge resources and transportation as appropriate   Identify discharge learning needs (meds, wound care, etc)  Taken 5/27/2025 2250 by Alex Olson LPN  Discharge to home or other facility with appropriate resources:   Identify barriers to discharge with patient and  caregiver   Arrange for needed discharge resources and transportation as appropriate   Identify discharge learning needs (meds, wound care, etc)     Problem: Pain  Goal: Verbalizes/displays adequate comfort level or baseline comfort level  5/28/2025 1916 by Alex Olson LPN  Outcome: Progressing  Flowsheets (Taken 5/28/2025 1906)  Verbalizes/displays adequate comfort level or baseline comfort level:   Encourage patient to monitor pain and request assistance   Administer analgesics based on type and severity of pain and evaluate response   Implement non-pharmacological measures as appropriate and evaluate response   Assess pain using appropriate pain scale  5/28/2025 1226 by Harper Gallego, RN  Outcome: Progressing  Flowsheets (Taken 5/27/2025 2000 by Alex Olson LPN)  Verbalizes/displays adequate comfort level or baseline comfort level:   Assess pain using appropriate pain scale   Encourage patient to monitor pain and request assistance   Administer analgesics based on type and severity of pain and evaluate response   Implement non-pharmacological measures as appropriate and evaluate response     Problem: Skin/Tissue Integrity  Goal: Skin integrity remains intact  Description: 1.  Monitor for areas of redness and/or skin breakdown2.  Assess vascular access sites hourly3.  Every 4-6 hours minimum:  Change oxygen saturation probe site4.  Every 4-6 hours:  If on nasal continuous positive airway pressure, respiratory therapy assess nares and determine need for appliance change or resting period  5/28/2025 1916 by Alex Olson LPN  Outcome: Progressing  5/28/2025 1226 by Harper Gallego, RN  Outcome: Progressing  Flowsheets (Taken 5/27/2025 2250 by Alex Olson LPN)  Skin Integrity Remains Intact: Monitor for areas of redness and/or skin breakdown     Problem: ABCDS Injury Assessment  Goal: Absence of physical injury  5/28/2025 1916 by Alex Olson LPN  Outcome: Progressing  5/28/2025 1226 by

## 2025-05-28 NOTE — PLAN OF CARE
Problem: Chronic Conditions and Co-morbidities  Goal: Patient's chronic conditions and co-morbidity symptoms are monitored and maintained or improved  5/27/2025 2006 by Alex Olson LPN  Outcome: Progressing  5/27/2025 1802 by Hilaria Licea LPN  Outcome: Progressing     Problem: Discharge Planning  Goal: Discharge to home or other facility with appropriate resources  5/27/2025 2006 by Alex Olson LPN  Outcome: Progressing  5/27/2025 1802 by Hilaria Licea LPN  Outcome: Progressing     Problem: Pain  Goal: Verbalizes/displays adequate comfort level or baseline comfort level  5/27/2025 2006 by Alex Olson LPN  Outcome: Progressing  Flowsheets (Taken 5/27/2025 2000)  Verbalizes/displays adequate comfort level or baseline comfort level:   Assess pain using appropriate pain scale   Encourage patient to monitor pain and request assistance   Administer analgesics based on type and severity of pain and evaluate response   Implement non-pharmacological measures as appropriate and evaluate response  5/27/2025 1802 by Hilaria Licea LPN  Outcome: Progressing     Problem: Skin/Tissue Integrity  Goal: Skin integrity remains intact  Description: 1.  Monitor for areas of redness and/or skin breakdown2.  Assess vascular access sites hourly3.  Every 4-6 hours minimum:  Change oxygen saturation probe site4.  Every 4-6 hours:  If on nasal continuous positive airway pressure, respiratory therapy assess nares and determine need for appliance change or resting period  5/27/2025 2006 by Alex Olson LPN  Outcome: Progressing  5/27/2025 1802 by Hilaria Licea LPN  Outcome: Progressing  Flowsheets (Taken 5/27/2025 1801)  Skin Integrity Remains Intact:   Monitor for areas of redness and/or skin breakdown   Turn and reposition as indicated     Problem: ABCDS Injury Assessment  Goal: Absence of physical injury  5/27/2025 2006 by Alex Olson LPN  Outcome: Progressing  5/27/2025 1802 by Hilaria Licea

## 2025-05-28 NOTE — PROGRESS NOTES
4 Eyes Skin Assessment     NAME:  Courtney Taylor  YOB: 1940  MEDICAL RECORD NUMBER:  [id number]    The patient is being assessed for  Other Carlos Alberto score     I agree that at least one RN has performed a thorough Head to Toe Skin Assessment on the patient. ALL assessment sites listed below have been assessed.      Areas assessed by both nurses:    Head, Face, Ears, Shoulders, Back, Chest, Arms, Elbows, Hands, Sacrum. Buttock, Coccyx, Ischium, Legs. Feet and Heels, and Under Medical Devices         Does the Patient have a Wound? No noted wound(s)       Carlos Alberto Prevention initiated by RN: Yes  Wound Care Orders initiated by RN: No    Pressure Injury (Stage 3,4, Unstageable, DTI, NWPT, and Complex wounds) if present, place Wound referral order by RN under : No    New Ostomies, if present place, Ostomy referral order under : No     Nurse 1 eSignature: Electronically signed by Monique Saez RN on 5/28/25 at 7:32 PM EDT    **SHARE this note so that the co-signing nurse can place an eSignature**    Nurse 2 eSignature: Electronically signed by MARGAUX TENORIO RN on 5/29/25 at 4:31 PM EDT

## 2025-05-29 LAB
GLUCOSE BLD-MCNC: 115 MG/DL (ref 70–99)
GLUCOSE BLD-MCNC: 119 MG/DL (ref 70–99)
GLUCOSE BLD-MCNC: 120 MG/DL (ref 70–99)
GLUCOSE BLD-MCNC: 86 MG/DL (ref 70–99)
PERFORMED ON: ABNORMAL
PERFORMED ON: NORMAL

## 2025-05-29 PROCEDURE — 6370000000 HC RX 637 (ALT 250 FOR IP): Performed by: NURSE PRACTITIONER

## 2025-05-29 PROCEDURE — 97116 GAIT TRAINING THERAPY: CPT

## 2025-05-29 PROCEDURE — 6370000000 HC RX 637 (ALT 250 FOR IP)

## 2025-05-29 PROCEDURE — 1200000000 HC SEMI PRIVATE

## 2025-05-29 PROCEDURE — 6370000000 HC RX 637 (ALT 250 FOR IP): Performed by: INTERNAL MEDICINE

## 2025-05-29 PROCEDURE — 97530 THERAPEUTIC ACTIVITIES: CPT

## 2025-05-29 PROCEDURE — 97110 THERAPEUTIC EXERCISES: CPT

## 2025-05-29 PROCEDURE — 6360000002 HC RX W HCPCS

## 2025-05-29 RX ORDER — OXYCODONE AND ACETAMINOPHEN 5; 325 MG/1; MG/1
1 TABLET ORAL EVERY 4 HOURS PRN
Qty: 3 TABLET | Refills: 0 | Status: SHIPPED | OUTPATIENT
Start: 2025-05-29 | End: 2025-06-01

## 2025-05-29 RX ORDER — ATORVASTATIN CALCIUM 40 MG/1
40 TABLET, FILM COATED ORAL DAILY
Qty: 30 TABLET | Refills: 3 | Status: SHIPPED | OUTPATIENT
Start: 2025-05-30

## 2025-05-29 RX ORDER — TAMSULOSIN HYDROCHLORIDE 0.4 MG/1
0.4 CAPSULE ORAL DAILY
Qty: 30 CAPSULE | Refills: 3 | Status: SHIPPED | OUTPATIENT
Start: 2025-05-30

## 2025-05-29 RX ORDER — NITROFURANTOIN 25; 75 MG/1; MG/1
100 CAPSULE ORAL EVERY 12 HOURS SCHEDULED
Qty: 4 CAPSULE | Refills: 0 | Status: SHIPPED | OUTPATIENT
Start: 2025-05-29 | End: 2025-05-31

## 2025-05-29 RX ADMIN — PANTOPRAZOLE SODIUM 40 MG: 40 TABLET, DELAYED RELEASE ORAL at 05:20

## 2025-05-29 RX ADMIN — METOPROLOL TARTRATE 50 MG: 50 TABLET, FILM COATED ORAL at 10:23

## 2025-05-29 RX ADMIN — DICLOFENAC SODIUM 2 G: 10 GEL TOPICAL at 14:52

## 2025-05-29 RX ADMIN — NITROFURANTOIN MONOHYDRATE/MACROCRYSTALLINE 100 MG: 25; 75 CAPSULE ORAL at 19:53

## 2025-05-29 RX ADMIN — DICLOFENAC SODIUM 2 G: 10 GEL TOPICAL at 20:00

## 2025-05-29 RX ADMIN — MICONAZOLE NITRATE: 2 POWDER TOPICAL at 20:00

## 2025-05-29 RX ADMIN — OXYCODONE AND ACETAMINOPHEN 1 TABLET: 5; 325 TABLET ORAL at 03:42

## 2025-05-29 RX ADMIN — ATORVASTATIN CALCIUM 40 MG: 40 TABLET, FILM COATED ORAL at 10:22

## 2025-05-29 RX ADMIN — NITROFURANTOIN MONOHYDRATE/MACROCRYSTALLINE 100 MG: 25; 75 CAPSULE ORAL at 10:37

## 2025-05-29 RX ADMIN — LEVOTHYROXINE SODIUM 125 MCG: 0.12 TABLET ORAL at 05:20

## 2025-05-29 RX ADMIN — TAMSULOSIN HYDROCHLORIDE 0.4 MG: 0.4 CAPSULE ORAL at 10:25

## 2025-05-29 RX ADMIN — DONEPEZIL HYDROCHLORIDE 10 MG: 5 TABLET, FILM COATED ORAL at 19:53

## 2025-05-29 RX ADMIN — HEPARIN SODIUM 5000 UNITS: 5000 INJECTION INTRAVENOUS; SUBCUTANEOUS at 13:18

## 2025-05-29 RX ADMIN — DICYCLOMINE HYDROCHLORIDE 20 MG: 20 TABLET ORAL at 18:21

## 2025-05-29 RX ADMIN — CITALOPRAM HYDROBROMIDE 20 MG: 20 TABLET ORAL at 10:24

## 2025-05-29 RX ADMIN — DICYCLOMINE HYDROCHLORIDE 20 MG: 20 TABLET ORAL at 13:17

## 2025-05-29 RX ADMIN — METHOCARBAMOL 750 MG: 750 TABLET ORAL at 10:23

## 2025-05-29 RX ADMIN — LOSARTAN POTASSIUM 50 MG: 25 TABLET, FILM COATED ORAL at 10:25

## 2025-05-29 RX ADMIN — ASPIRIN 81 MG: 81 TABLET, COATED ORAL at 10:25

## 2025-05-29 RX ADMIN — OXYCODONE AND ACETAMINOPHEN 1 TABLET: 5; 325 TABLET ORAL at 09:46

## 2025-05-29 RX ADMIN — MEMANTINE 5 MG: 5 TABLET ORAL at 10:23

## 2025-05-29 RX ADMIN — METHOCARBAMOL 750 MG: 750 TABLET ORAL at 13:17

## 2025-05-29 RX ADMIN — NIFEDIPINE 30 MG: 30 TABLET, FILM COATED, EXTENDED RELEASE ORAL at 10:24

## 2025-05-29 RX ADMIN — OXYCODONE AND ACETAMINOPHEN 1 TABLET: 5; 325 TABLET ORAL at 13:35

## 2025-05-29 RX ADMIN — METHOCARBAMOL 750 MG: 750 TABLET ORAL at 19:53

## 2025-05-29 RX ADMIN — HEPARIN SODIUM 5000 UNITS: 5000 INJECTION INTRAVENOUS; SUBCUTANEOUS at 05:21

## 2025-05-29 RX ADMIN — OXYCODONE AND ACETAMINOPHEN 1 TABLET: 5; 325 TABLET ORAL at 19:53

## 2025-05-29 RX ADMIN — DICYCLOMINE HYDROCHLORIDE 20 MG: 20 TABLET ORAL at 05:20

## 2025-05-29 RX ADMIN — DICYCLOMINE HYDROCHLORIDE 20 MG: 20 TABLET ORAL at 19:53

## 2025-05-29 RX ADMIN — GABAPENTIN 300 MG: 300 CAPSULE ORAL at 10:22

## 2025-05-29 RX ADMIN — HEPARIN SODIUM 5000 UNITS: 5000 INJECTION INTRAVENOUS; SUBCUTANEOUS at 19:53

## 2025-05-29 ASSESSMENT — PAIN DESCRIPTION - LOCATION
LOCATION: SHOULDER;BACK
LOCATION: LEG;WRIST
LOCATION: LEG
LOCATION: ARM;BACK;LEG
LOCATION: BACK;ARM;LEG
LOCATION: BACK;ARM;LEG
LOCATION: ARM;HAND;LEG
LOCATION: ARM;LEG
LOCATION: HAND;WRIST

## 2025-05-29 ASSESSMENT — PAIN DESCRIPTION - DESCRIPTORS
DESCRIPTORS: ACHING;DISCOMFORT

## 2025-05-29 ASSESSMENT — PAIN DESCRIPTION - PAIN TYPE
TYPE: ACUTE PAIN;CHRONIC PAIN
TYPE: CHRONIC PAIN

## 2025-05-29 ASSESSMENT — PAIN SCALES - GENERAL
PAINLEVEL_OUTOF10: 6
PAINLEVEL_OUTOF10: 5
PAINLEVEL_OUTOF10: 6
PAINLEVEL_OUTOF10: 6
PAINLEVEL_OUTOF10: 5
PAINLEVEL_OUTOF10: 7
PAINLEVEL_OUTOF10: 8
PAINLEVEL_OUTOF10: 6

## 2025-05-29 ASSESSMENT — PAIN DESCRIPTION - ORIENTATION
ORIENTATION: RIGHT;LEFT
ORIENTATION: LEFT
ORIENTATION: LEFT
ORIENTATION: RIGHT;LEFT;MID
ORIENTATION: RIGHT;LEFT;MID
ORIENTATION: RIGHT;LEFT
ORIENTATION: RIGHT;LEFT;MID

## 2025-05-29 ASSESSMENT — PAIN DESCRIPTION - ONSET
ONSET: ON-GOING
ONSET: ON-GOING

## 2025-05-29 ASSESSMENT — PAIN DESCRIPTION - FREQUENCY
FREQUENCY: CONTINUOUS
FREQUENCY: CONTINUOUS

## 2025-05-29 ASSESSMENT — PAIN - FUNCTIONAL ASSESSMENT: PAIN_FUNCTIONAL_ASSESSMENT: PREVENTS OR INTERFERES SOME ACTIVE ACTIVITIES AND ADLS

## 2025-05-29 NOTE — PLAN OF CARE
Problem: Pain  Goal: Verbalizes/displays adequate comfort level or baseline comfort level  Outcome: Progressing  Flowsheets  Taken 5/29/2025 1232 by Harper Gallego RN  Verbalizes/displays adequate comfort level or baseline comfort level:   Encourage patient to monitor pain and request assistance   Assess pain using appropriate pain scale   Administer analgesics based on type and severity of pain and evaluate response   Implement non-pharmacological measures as appropriate and evaluate response   Notify Licensed Independent Practitioner if interventions unsuccessful or patient reports new pain  Taken 5/29/2025 1115 by Harper Gallego RN  Verbalizes/displays adequate comfort level or baseline comfort level:   Encourage patient to monitor pain and request assistance   Assess pain using appropriate pain scale   Administer analgesics based on type and severity of pain and evaluate response   Notify Licensed Independent Practitioner if interventions unsuccessful or patient reports new pain

## 2025-05-29 NOTE — PROGRESS NOTES
Occupational Therapy  Facility/Department: [facility] C5 - MED SURG/ORTHO  Daily Treatment Note  NAME: Courtney Taylor  : 1940  MRN: [id number]    Date of Service: 2025    Discharge Recommendations:  Subacute/Skilled Nursing Facility       Therapy discharge recommendations are subject to collaboration from the patient’s interdisciplinary healthcare team, including MD and case management recommendations.     Barriers to Home Discharge:   [] Steps to access home entry or bed/bath   [x] Unable to transfer, ambulate, or propel wheelchair household distances without assist   [x] Limited available assist at home upon discharge    [x] Patient or family requests d/c to post-acute facility    [x] Poor cognition/safety awareness for d/c to home alone    [] Unable to maintain ordered weight bearing status    [] Patient with salient signs of long-standing immobility   [x] Decreased independence with ADLs   [x] Increased risk for falls   [] Other:    Patient Diagnosis(es): The primary encounter diagnosis was TIANA (acute kidney injury). Diagnoses of Acute hyponatremia, Nonintractable headache, unspecified chronicity pattern, unspecified headache type, Other chronic pain, Dehydration, and Dysarthria were also pertinent to this visit.     Assessment   Assessment: Pt tolerated session fair, very limited by c/o pain requiring encouragement and redirection. Co-tx collaboration this date with PT staff to safely progress pt toward goals. Pt will have better occupational performance outcomes within a co-treatment than 1:1 session. Pt requiring min-mod A of 2 for all transfer training with increased time, pt tearful on and off throughout. Pt able to participate in gentle ROM while seated in chair, appears more comfortable with gentle ROM once positioned in chair, however pt continues to report high pain level (RN aware and previously medicated pt). Pt functioning below her baseline, continue to recommend SNF at d/c.   Activity  much help is needed for bathing (which includes washing, rinsing, drying)?: Total  How much help is needed for toileting (which includes using toilet, bedpan, or urinal)?: Total  How much help is needed for putting on and taking off regular upper body clothing?: Total  How much help is needed for taking care of personal grooming?: A Lot  How much help for eating meals?: A Little  AM-[facility] Inpatient Daily Activity Raw Score: 9  AM-PAC Inpatient ADL T-Scale Score : 25.33  ADL Inpatient CMS 0-100% Score: 79.59  ADL Inpatient CMS G-Code Modifier : CL    Therapy Time   Individual Concurrent Group Co-treatment   Time In       0939   Time Out       1023   Minutes       44           MICHELINE Leach/FRAN

## 2025-05-29 NOTE — PROGRESS NOTES
Comprehensive Nutrition Assessment    Type and Reason for Visit:  Initial, LOS    Nutrition Recommendations/Plan:   Liberalize CC4 (60 g/meal) diet to regular to promote PO intake during stay.   Monitor BG and need to resume CC diet.   Trial Glucerna BID (monitor acceptance).   Encourage PO intake as tolerated.   Monitor nutrition adequacy, pertinent labs, bowel habits, wt changes, and clinical progress.      Malnutrition Assessment:  Malnutrition Status:  At risk for malnutrition (05/29/25 1411)    Context:  Acute Illness     Findings of the 6 clinical characteristics of malnutrition:  Energy Intake:  75% or less of estimated energy requirements for 7 or more days  Weight Loss:  Unable to assess (mainly unspecified collection methods and labile wts this admission)     Body Fat Loss:  Unable to assess     Muscle Mass Loss:  Unable to assess    Fluid Accumulation:  Mild Extremities   Strength:  Not Performed    Nutrition Assessment:    Attempted to visit pt for LOS but other staff was attending. Admitted w/ headache but found to have TIANA. PMH of DM, previous TIANA, and thyroid cancer. Palliative care following. Currently on a CC4 (60 g/meal) diet w/ variable PO intakes of 0-50%. Will trial Glucerna BID to better meet estimated nutrition needs during stay. Wts difficult to assess in EMR d/t mainly unspecified collection methods and being labile this admission (may be r/t fluid shifts as pt is -12.8L per I/Os). Will continue to monitor.    Nutrition Related Findings:    Last BM documented 5/27. BG x 24 hours: . HbA1c: 5.6% (5/12/25). Non-pitting BUE/BLE edema. Wound Type: None       Current Nutrition Intake & Therapies:    Average Meal Intake: 0%, 1-25%, 26-50% (3 intakes recorded)  Average Supplements Intake: None Ordered  ADULT DIET; Regular; 4 carb choices (60 gm/meal)    Anthropometric Measures:  Height: 152.4 cm (5')  Ideal Body Weight (IBW): 100 lbs (45 kg)    Current Body Weight: 102.3 kg (225 lb 8.5  oz), 225.5 % IBW. Weight Source: Bed scale  Current BMI (kg/m2): 44  Weight Adjustment For: No Adjustment  BMI Categories: Obese Class 3 (BMI 40.0 or greater)    Estimated Daily Nutrient Needs:  Energy Requirements Based On: Kcal/kg  Weight Used for Energy Requirements: Current  Energy (kcal/day): 1,535-1,739 (15-17 kcal/kg CBW)  Weight Used for Protein Requirements: Ideal  Protein (g/day): 50-59 (1.1-1.3 g/kg IBW)  Method Used for Fluid Requirements: 1 ml/kcal  Fluid (ml/day): 1,535-1,739    Nutrition Diagnosis:   Inadequate oral intake related to decreased appetite as evidenced by intake 0-25%, intake 26-50%    Nutrition Interventions:   Food and/or Nutrient Delivery: Modify Current Diet, Start Oral Nutrition Supplement  Nutrition Education/Counseling: No recommendation at this time  Coordination of Nutrition Care: Continue to monitor while inpatient       Goals:  Goals: PO intake 50% or greater, prior to discharge  Type of Goal: New goal       Nutrition Monitoring and Evaluation:   Behavioral-Environmental Outcomes: None Identified  Food/Nutrient Intake Outcomes: Food and Nutrient Intake, Supplement Intake  Physical Signs/Symptoms Outcomes: Biochemical Data, Meal Time Behavior, Nutrition Focused Physical Findings, Weight, Fluid Status or Edema    Discharge Planning:    Continue current diet     Citlaly Gutierres RD  Contact: [phone]

## 2025-05-29 NOTE — PLAN OF CARE
Problem: Chronic Conditions and Co-morbidities  Goal: Patient's chronic conditions and co-morbidity symptoms are monitored and maintained or improved  Outcome: Progressing  Flowsheets (Taken 5/29/2025 1115)  Care Plan - Patient's Chronic Conditions and Co-Morbidity Symptoms are Monitored and Maintained or Improved:   Monitor and assess patient's chronic conditions and comorbid symptoms for stability, deterioration, or improvement   Collaborate with multidisciplinary team to address chronic and comorbid conditions and prevent exacerbation or deterioration   Update acute care plan with appropriate goals if chronic or comorbid symptoms are exacerbated and prevent overall improvement and discharge     Problem: Discharge Planning  Goal: Discharge to home or other facility with appropriate resources  Outcome: Progressing  Flowsheets  Taken 5/29/2025 1232  Discharge to home or other facility with appropriate resources:   Identify barriers to discharge with patient and caregiver   Arrange for needed discharge resources and transportation as appropriate   Identify discharge learning needs (meds, wound care, etc)   Refer to discharge planning if patient needs post-hospital services based on physician order or complex needs related to functional status, cognitive ability or social support system  Taken 5/29/2025 1115  Discharge to home or other facility with appropriate resources:   Identify barriers to discharge with patient and caregiver   Arrange for needed discharge resources and transportation as appropriate   Identify discharge learning needs (meds, wound care, etc)   Refer to discharge planning if patient needs post-hospital services based on physician order or complex needs related to functional status, cognitive ability or social support system     Problem: Pain  Goal: Verbalizes/displays adequate comfort level or baseline comfort level  Outcome: Progressing  Flowsheets  Taken 5/29/2025 1232  Verbalizes/displays  adequate comfort level or baseline comfort level:   Encourage patient to monitor pain and request assistance   Assess pain using appropriate pain scale   Administer analgesics based on type and severity of pain and evaluate response   Implement non-pharmacological measures as appropriate and evaluate response   Notify Licensed Independent Practitioner if interventions unsuccessful or patient reports new pain  Taken 5/29/2025 1115  Verbalizes/displays adequate comfort level or baseline comfort level:   Encourage patient to monitor pain and request assistance   Assess pain using appropriate pain scale   Administer analgesics based on type and severity of pain and evaluate response   Notify Licensed Independent Practitioner if interventions unsuccessful or patient reports new pain     Problem: Skin/Tissue Integrity  Goal: Skin integrity remains intact  Description: 1.  Monitor for areas of redness and/or skin breakdown2.  Assess vascular access sites hourly3.  Every 4-6 hours minimum:  Change oxygen saturation probe site4.  Every 4-6 hours:  If on nasal continuous positive airway pressure, respiratory therapy assess nares and determine need for appliance change or resting period  Outcome: Progressing  Flowsheets  Taken 5/29/2025 1232  Skin Integrity Remains Intact:   Monitor for areas of redness and/or skin breakdown   Turn and reposition as indicated   Positioning devices   Monitor skin under medical devices  Taken 5/29/2025 1115  Skin Integrity Remains Intact:   Monitor for areas of redness and/or skin breakdown   Assess vascular access sites hourly   Turn and reposition as indicated   Positioning devices     Problem: Safety - Adult  Goal: Free from fall injury  Outcome: Progressing  Flowsheets (Taken 5/29/2025 1232)  Free From Fall Injury:   Instruct family/caregiver on patient safety   Based on caregiver fall risk screen, instruct family/caregiver to ask for assistance with transferring infant if caregiver noted to

## 2025-05-29 NOTE — CARE COORDINATION
Discharge canceled    CASE MANAGEMENT DISCHARGE SUMMARY      Discharge to: [facility]    Precertification completed: yes  Hospital Exemption Notification (HENS) completed: N/A    IMM given: (date) 5/27/25    New Durable Medical Equipment ordered/agency: Provided by the facility    Transportation:       Medical Transport explained to pt/family. Pt/family voice no agency preference.    Agency used:[facility]   time: 1500   Ambulance form completed: Yes    Confirmed discharge plan with:Patient returning to [facility] for rehab      Patient: yes     Family:  yes   Name: Thalia Contact number: [phone]; Left message on  for call back to update     Facility/Agency, name: [facility] can pull from [facility] JACKELIN/AVS does not need to be faxed   Phone number for report to facility: [phone]      RN, name: Monique    Note: Discharging nurse to complete JACKELIN, reconcile AVS, and place final copy with patient's discharge packet. RN to ensure that written prescriptions for  Level II medications are sent with patient to the facility as per protocol.  .Jesica Castro RN

## 2025-05-29 NOTE — PLAN OF CARE
Problem: Chronic Conditions and Co-morbidities  Goal: Patient's chronic conditions and co-morbidity symptoms are monitored and maintained or improved  5/29/2025 1444 by Monique Saez RN  Outcome: Adequate for Discharge  5/29/2025 1232 by Harper Gallego RN  Outcome: Progressing  Flowsheets (Taken 5/29/2025 1115)  Care Plan - Patient's Chronic Conditions and Co-Morbidity Symptoms are Monitored and Maintained or Improved:   Monitor and assess patient's chronic conditions and comorbid symptoms for stability, deterioration, or improvement   Collaborate with multidisciplinary team to address chronic and comorbid conditions and prevent exacerbation or deterioration   Update acute care plan with appropriate goals if chronic or comorbid symptoms are exacerbated and prevent overall improvement and discharge     Problem: Discharge Planning  Goal: Discharge to home or other facility with appropriate resources  5/29/2025 1444 by Monique Saez RN  Outcome: Adequate for Discharge  5/29/2025 1232 by Harper Gallego RN  Outcome: Progressing  Flowsheets  Taken 5/29/2025 1232  Discharge to home or other facility with appropriate resources:   Identify barriers to discharge with patient and caregiver   Arrange for needed discharge resources and transportation as appropriate   Identify discharge learning needs (meds, wound care, etc)   Refer to discharge planning if patient needs post-hospital services based on physician order or complex needs related to functional status, cognitive ability or social support system  Taken 5/29/2025 1115  Discharge to home or other facility with appropriate resources:   Identify barriers to discharge with patient and caregiver   Arrange for needed discharge resources and transportation as appropriate   Identify discharge learning needs (meds, wound care, etc)   Refer to discharge planning if patient needs post-hospital services based on physician order or complex needs related to

## 2025-05-29 NOTE — PROGRESS NOTES
Physical Therapy  Facility/Department: [facility] C5 - MED SURG/ORTHO  Daily Treatment Note  NAME: Courtney Taylor  : 1940  MRN: [id number]    Date of Service: 2025    Discharge Recommendations:  Subacute/Skilled Nursing Facility   PT Equipment Recommendations  Equipment Needed: No    Patient Diagnosis(es): The primary encounter diagnosis was TIANA (acute kidney injury). Diagnoses of Acute hyponatremia, Nonintractable headache, unspecified chronicity pattern, unspecified headache type, Other chronic pain, Dehydration, and Dysarthria were also pertinent to this visit.    Assessment  Assessment: Pt performed bed mobility with mod Ax2 and sit<>stand transfers with mod A x 2 rw. Pt amb 3' mni A x 2 rw.Session limited by pt c/o arm pain (RN made aware), pt limited by decreased functional mobility, strength, endurance, ROM, and balance. Pt would benefit from skilled PT in this setting to address the above deficits and recommend return to SNF at discharge.  Activity Tolerance: Patient limited by pain;Patient limited by endurance    Plan  Physical Therapy Plan  General Plan: 3-5 times per week  Current Treatment Recommendations: Strengthening;ROM;Balance training;Functional mobility training;Transfer training;ADL/Self-care training;Endurance training;Gait training;Safety education & training;Equipment evaluation, education, & procurement;Therapeutic activities    Restrictions  Restrictions/Precautions  Restrictions/Precautions: Fall Risk, Contact Precautions  Activity Level: Up with Assist  Required Braces or Orthoses?: No  Position Activity Restriction  Other Position/Activity Restrictions: contact for MDRO, purewick     Subjective   Subjective  Subjective: Pt agrees to PT session  Pain: Pt c/o pain in shoulders at start of session but as session progressed and pain better evaluated pt c/o pain in hands, wrists, and LLE, RN aware and administered medication at start of session.    Objective  Vitals  Pulse: 81  BP: (!)

## 2025-05-29 NOTE — PROGRESS NOTES
[facility] Medicine Progress Note  V 5.1      Date of Admission: 5/21/2025    Hospital Day: 9      Chief Admission Complaint:  Headache    Subjective:  Emr and notes reviewed pt in bed. Looks good today, No new complaints.        Presenting Admission History:       85 y.o. female who presented to [facility] with headache.  PMHx significant for diabetes, previous TIANA/renal failure, chronic back and left hip pain, hard of hearing, fibromyalgia, anxiety, thyroid cancer, diabetes.  She reports headache for 2 days with bodyaches.  No fevers or chills.  Family states that patient has had renal injury and hyponatremia in the past.  Patient lives at a nursing home (possibly [facility] ([place], [place] and family is unaware of her p.o. intake.  Patient's left hip pain and body pain has been chronic for years.  Per grandchildren, patient was also having slurred speech and her face was sagging at nursing home.  No other known weakness or deficits in all this seems to have resolved.  Patient has had hypotension in the ED.  Blood work in the ED significant for hemoglobin 10.7, sodium 128, creatinine 3.5, GFR 12.  Chest x-ray showed no acute abnormality.  CT head showed no acute abnormality.  Patient was given 2 fluid boluses.  Will admit patient for dehydration, TIANA, hyponatremia, CVA workup.     Assessment/Plan:       TIANA - with elevated BUN/Cr ratio, likely secondary to pre-renal azotemia.  Volume expansion given. Nephrology following. Holding Lasix, Jardiance, Losartan and Nifedipine. Will trial off IVF hydration. Monitor BMP.   - crt has normalized   -good uo     Hyponatremia  - Likely secondary to decreased volume status from decreased p.o. intake  - Urine sodium, urine osmolality, serum osmolality pending  - Nephrology following  - appears resolved     HypoKalemia - etiology clinically unable to determine.  Followed serial Potassium, personally reviewed and documented in this note. Replaced PRN.

## 2025-05-29 NOTE — PROGRESS NOTES
4 Eyes Skin Assessment     NAME:  Courtney Taylor  YOB: 1940  MEDICAL RECORD NUMBER:  [id number]    The patient is being assessed for  Other Carlos Alberto Score <18    I agree that at least one RN has performed a thorough Head to Toe Skin Assessment on the patient. ALL assessment sites listed below have been assessed.      Areas assessed by both nurses:    Head, Face, Ears, Shoulders, Back, Chest, Arms, Elbows, Hands, Sacrum. Buttock, Coccyx, Ischium, Legs. Feet and Heels, and Under Medical Devices         Does the Patient have a Wound? No noted wound(s)       Carlos Alberto Prevention initiated by RN: Yes  Wound Care Orders initiated by RN: No    Pressure Injury (Stage 3,4, Unstageable, DTI, NWPT, and Complex wounds) if present, place Wound referral order by RN under : No    New Ostomies, if present place, Ostomy referral order under : No     Nurse 1 eSignature: Electronically signed by MARGAUX TENORIO RN on 5/29/25 at 11:15 AM EDT    **SHARE this note so that the co-signing nurse can place an eSignature**    Nurse 2 eSignature: {Esignature:[id number]}

## 2025-05-29 NOTE — PROGRESS NOTES
Patient IV and tele removed, no complications. All discharge instructions sent with transport at time of departure. All belongings sent with patient and transport at time of discharge. Report called to facility, and all questions answered.

## 2025-05-30 VITALS
SYSTOLIC BLOOD PRESSURE: 166 MMHG | BODY MASS INDEX: 44.28 KG/M2 | TEMPERATURE: 98.4 F | HEART RATE: 74 BPM | WEIGHT: 225.53 LBS | HEIGHT: 60 IN | RESPIRATION RATE: 16 BRPM | OXYGEN SATURATION: 93 % | DIASTOLIC BLOOD PRESSURE: 74 MMHG

## 2025-05-30 LAB
GLUCOSE BLD-MCNC: 88 MG/DL (ref 70–99)
GLUCOSE BLD-MCNC: 94 MG/DL (ref 70–99)
PERFORMED ON: NORMAL
PERFORMED ON: NORMAL

## 2025-05-30 PROCEDURE — 6370000000 HC RX 637 (ALT 250 FOR IP)

## 2025-05-30 PROCEDURE — 6370000000 HC RX 637 (ALT 250 FOR IP): Performed by: NURSE PRACTITIONER

## 2025-05-30 PROCEDURE — 6370000000 HC RX 637 (ALT 250 FOR IP): Performed by: INTERNAL MEDICINE

## 2025-05-30 PROCEDURE — 6360000002 HC RX W HCPCS

## 2025-05-30 RX ADMIN — ASPIRIN 81 MG: 81 TABLET, COATED ORAL at 09:58

## 2025-05-30 RX ADMIN — DICYCLOMINE HYDROCHLORIDE 20 MG: 20 TABLET ORAL at 05:05

## 2025-05-30 RX ADMIN — OXYCODONE AND ACETAMINOPHEN 1 TABLET: 5; 325 TABLET ORAL at 05:05

## 2025-05-30 RX ADMIN — NIFEDIPINE 30 MG: 30 TABLET, FILM COATED, EXTENDED RELEASE ORAL at 09:58

## 2025-05-30 RX ADMIN — LEVOTHYROXINE SODIUM 125 MCG: 0.12 TABLET ORAL at 05:05

## 2025-05-30 RX ADMIN — HEPARIN SODIUM 5000 UNITS: 5000 INJECTION INTRAVENOUS; SUBCUTANEOUS at 05:05

## 2025-05-30 RX ADMIN — NITROFURANTOIN MONOHYDRATE/MACROCRYSTALLINE 100 MG: 25; 75 CAPSULE ORAL at 09:58

## 2025-05-30 RX ADMIN — MICONAZOLE NITRATE: 2 POWDER TOPICAL at 09:59

## 2025-05-30 RX ADMIN — TAMSULOSIN HYDROCHLORIDE 0.4 MG: 0.4 CAPSULE ORAL at 10:01

## 2025-05-30 RX ADMIN — METHOCARBAMOL 750 MG: 750 TABLET ORAL at 09:58

## 2025-05-30 RX ADMIN — LOSARTAN POTASSIUM 50 MG: 25 TABLET, FILM COATED ORAL at 09:58

## 2025-05-30 RX ADMIN — GABAPENTIN 300 MG: 300 CAPSULE ORAL at 09:58

## 2025-05-30 RX ADMIN — METOPROLOL TARTRATE 50 MG: 50 TABLET, FILM COATED ORAL at 09:58

## 2025-05-30 RX ADMIN — ATORVASTATIN CALCIUM 40 MG: 40 TABLET, FILM COATED ORAL at 09:58

## 2025-05-30 RX ADMIN — CITALOPRAM HYDROBROMIDE 20 MG: 20 TABLET ORAL at 09:58

## 2025-05-30 RX ADMIN — DICLOFENAC SODIUM 2 G: 10 GEL TOPICAL at 09:58

## 2025-05-30 RX ADMIN — PANTOPRAZOLE SODIUM 40 MG: 40 TABLET, DELAYED RELEASE ORAL at 05:05

## 2025-05-30 RX ADMIN — DICYCLOMINE HYDROCHLORIDE 20 MG: 20 TABLET ORAL at 09:58

## 2025-05-30 RX ADMIN — MEMANTINE 5 MG: 5 TABLET ORAL at 09:58

## 2025-05-30 ASSESSMENT — PAIN DESCRIPTION - LOCATION: LOCATION: LEG

## 2025-05-30 ASSESSMENT — PAIN DESCRIPTION - DESCRIPTORS
DESCRIPTORS: ACHING;DISCOMFORT
DESCRIPTORS: ACHING;DISCOMFORT

## 2025-05-30 ASSESSMENT — PAIN DESCRIPTION - ORIENTATION
ORIENTATION: RIGHT;LEFT
ORIENTATION: RIGHT;LEFT

## 2025-05-30 ASSESSMENT — PAIN SCALES - GENERAL
PAINLEVEL_OUTOF10: 8
PAINLEVEL_OUTOF10: 6

## 2025-05-30 NOTE — DISCHARGE SUMMARY
Hospital Medicine Discharge Summary    Patient: Courtney Taylor   : 1940     Hospital:  [facility]  Admit Date: 2025   Discharge Date:   25  Disposition:  [facility] [place] [place]   Code status:  Full  Condition at Discharge: Stable  Primary Care Provider: Noa Cardoza PA    Admitting Provider: Colten lBackwell MD  Discharge Provider: NESSA Chawla - CNP     Discharge Diagnoses:      Active Hospital Problems    Diagnosis     Acute kidney injury [N17.9]      TIANA - with elevated BUN/Cr ratio, likely secondary to pre-renal azotemia.  Volume expansion given. Nephrology following. Holding Lasix, Jardiance, Losartan and Nifedipine. Will trial off IVF hydration. Monitor BMP.   - crt has normalized   -good uo      Hyponatremia  - Likely secondary to decreased volume status from decreased p.o. intake  - Urine sodium, urine osmolality, serum osmolality pending  - Nephrology following  - appears resolved      HypoKalemia - etiology clinically unable to determine.  Followed serial Potassium, personally reviewed and documented in this note. Replaced PRN.        HypoMagnesemia - etiology clinically unable to determine.  Followed serial Magnesium, personally reviewed and documented in this note. Replaced PRN.    - resolved       Acute Cystitis: Admission UA showed moderate pyuria. Her UCx finalized  PM and showed MDR E.coli. She unfortunately has multiple Abx allergies listed without specific details. I have scoured available records in [facility] and [facility] and do not see any event leading to the Ceftriaxone allergy, aside from being documented by her new PCP 24. The patient is unable to provide details. Will either need to continue Merrem for short course or we will need to see if her family can provide clarification. Merrem was started for now.   -  sensitive to Macrobid and not a listed allergy, will stop Merrem and trial Macrobid   - stop jardiance in

## 2025-05-30 NOTE — PLAN OF CARE
Problem: Pain  Goal: Verbalizes/displays adequate comfort level or baseline comfort level  5/30/2025 1307 by Monique Saez, RN  Outcome: Adequate for Discharge  5/30/2025 1306 by Monique Saez, RN  Outcome: Progressing

## 2025-05-30 NOTE — CARE COORDINATION
Discharge to: [facility]    Precertification completed: yes  Hospital Exemption Notification (HENS) completed: N/A    IMM given: (date) 5/30/25    New Durable Medical Equipment ordered/agency: Provided by the facility    Transportation:       Medical Transport explained to pt/family. Pt/family voice no agency preference.    Agency used: [facility]   time: 1300   Ambulance form completed: Yes    Confirmed discharge plan with:Patient returning to [facility] for rehab      Patient: yes     Family:  yes   Name: Thalia Contact number: [phone]; Left message on  for call back to update     Facility/Agency, name: [facility] can pull from [facility] JACKELIN/AVS does not need to be faxed   Phone number for report to facility: [phone]      RN, name: Monique    Note: Discharging nurse to complete JACKELIN, reconcile AVS, and place final copy with patient's discharge packet. RN to ensure that written prescriptions for  Level II medications are sent with patient to the facility as per protocol.  .Jesica Castro RN

## 2025-06-02 ENCOUNTER — TELEPHONE (OUTPATIENT)
Dept: FAMILY MEDICINE CLINIC | Age: 85
End: 2025-06-02

## 2025-06-02 NOTE — TELEPHONE ENCOUNTER
Care Transitions Initial Follow Up Call    Outreach made within 2 business days of discharge: Yes    Patient: Courtney Taylor Patient : 1940   MRN: [id number]  Reason for Admission: TIANA  Discharge Date: 25       Spoke with: Patient admitted to [facility].  Once the patient is discharged I will schedule for a Hospital follow up appointment.    Discharge department/facility: [facility] Interactive Patient Contact:  Was patient able to fill all prescriptions: Yes  Was patient instructed to bring all medications to the follow-up visit: Yes  Is patient taking all medications as directed in the discharge summary? Yes  Does patient understand their discharge instructions: Yes  Does patient have questions or concerns that need addressed prior to 7-14 day follow up office visit: no        Scheduled appointment with PCP within 7-14 days    Follow Up  Future Appointments   Date Time Provider Department Center   6/3/2025 10:30 AM [facility]       Keya Aparicio LPN

## 2025-06-04 ENCOUNTER — CLINICAL DOCUMENTATION (OUTPATIENT)
Age: 85
End: 2025-06-04

## 2025-06-04 NOTE — PROGRESS NOTES
Pt had screened positively for loneliness/social isolation during recent hospitalization.  attempted to call Pt to offer outpatient telechaplaincy support. No answer on phone. Left voicemail for Pt, informing her of our availability for support.    Lamberto Cooper

## 2025-07-17 ENCOUNTER — TELEPHONE (OUTPATIENT)
Dept: FAMILY MEDICINE CLINIC | Age: 85
End: 2025-07-17

## 2025-07-17 NOTE — TELEPHONE ENCOUNTER
Care Transitions Initial Follow Up Call    Outreach made within 2 business days of discharge: Yes    Patient: Courtney Taylor Patient : 1940   MRN: [id number]  Reason for Admission: Back pain  Discharge Date: 25       Spoke with: Patient     Discharge department/facility: Patient discharged from [facility] discharged 25.    TCM Interactive Patient Contact:  Was patient able to fill all prescriptions: Yes  Was patient instructed to bring all medications to the follow-up visit: Yes  Is patient taking all medications as directed in the discharge summary? Yes  Does patient understand their discharge instructions: Yes  Does patient have questions or concerns that need addressed prior to 7-14 day follow up office visit: no        Scheduled appointment with PCP within 7-14 days    Follow Up  Future Appointments   Date Time Provider Department Center   2025 11:00 AM Noa Cardoza PA [facility] DEP       Keya Aparicio LPN

## 2025-07-21 ENCOUNTER — TELEPHONE (OUTPATIENT)
Dept: FAMILY MEDICINE CLINIC | Age: 85
End: 2025-07-21

## 2025-07-21 ENCOUNTER — OFFICE VISIT (OUTPATIENT)
Dept: FAMILY MEDICINE CLINIC | Age: 85
End: 2025-07-21

## 2025-07-21 ENCOUNTER — HOSPITAL ENCOUNTER (OUTPATIENT)
Dept: GENERAL RADIOLOGY | Age: 85
Discharge: HOME | End: 2025-07-21
Payer: MEDICARE

## 2025-07-21 VITALS
OXYGEN SATURATION: 95 % | DIASTOLIC BLOOD PRESSURE: 70 MMHG | SYSTOLIC BLOOD PRESSURE: 118 MMHG | BODY MASS INDEX: 42.18 KG/M2 | WEIGHT: 216 LBS | HEART RATE: 81 BPM

## 2025-07-21 DIAGNOSIS — R80.9 TYPE 2 DIABETES MELLITUS WITH DIABETIC MICROALBUMINURIA, WITHOUT LONG-TERM CURRENT USE OF INSULIN (HCC): Chronic | ICD-10-CM

## 2025-07-21 DIAGNOSIS — R30.0 DYSURIA: ICD-10-CM

## 2025-07-21 DIAGNOSIS — K59.03 DRUG-INDUCED CONSTIPATION: ICD-10-CM

## 2025-07-21 DIAGNOSIS — E11.29 TYPE 2 DIABETES MELLITUS WITH DIABETIC MICROALBUMINURIA, WITHOUT LONG-TERM CURRENT USE OF INSULIN (HCC): Chronic | ICD-10-CM

## 2025-07-21 DIAGNOSIS — Z09 HOSPITAL DISCHARGE FOLLOW-UP: Primary | ICD-10-CM

## 2025-07-21 DIAGNOSIS — M79.641 RIGHT HAND PAIN: ICD-10-CM

## 2025-07-21 DIAGNOSIS — L30.4 INTERTRIGO: ICD-10-CM

## 2025-07-21 PROBLEM — E11.65 TYPE 2 DIABETES MELLITUS WITH HYPERGLYCEMIA, WITHOUT LONG-TERM CURRENT USE OF INSULIN (HCC): Status: RESOLVED | Noted: 2025-05-11 | Resolved: 2025-07-21

## 2025-07-21 LAB
BILIRUBIN, POC: NORMAL
BLOOD URINE, POC: NORMAL
CLARITY, POC: CLEAR
COLOR, POC: YELLOW
GLUCOSE URINE, POC: 500 MG/DL
KETONES, POC: NORMAL MG/DL
LEUKOCYTE EST, POC: NORMAL
NITRITE, POC: NORMAL
PH, POC: 7
PROTEIN, POC: NORMAL MG/DL
SPECIFIC GRAVITY, POC: 1.01
UROBILINOGEN, POC: 0.2 MG/DL

## 2025-07-21 PROCEDURE — 73130 X-RAY EXAM OF HAND: CPT

## 2025-07-21 RX ORDER — MICONAZOLE NITRATE 2 G/100G
CREAM TOPICAL 2 TIMES DAILY
COMMUNITY

## 2025-07-21 RX ORDER — POLYETHYLENE GLYCOL 3350 17 G/17G
17 POWDER, FOR SOLUTION ORAL DAILY
COMMUNITY

## 2025-07-21 RX ORDER — NYSTATIN 100000 [USP'U]/G
POWDER TOPICAL
Qty: 60 G | Refills: 2 | Status: SHIPPED | OUTPATIENT
Start: 2025-07-21

## 2025-07-21 RX ORDER — PHENOL 1.4 %
1 AEROSOL, SPRAY (ML) MUCOUS MEMBRANE 2 TIMES DAILY PRN
COMMUNITY

## 2025-07-21 RX ORDER — SENNOSIDES 8.6 MG/1
1 TABLET ORAL DAILY
COMMUNITY

## 2025-07-21 RX ORDER — SENNA AND DOCUSATE SODIUM 50; 8.6 MG/1; MG/1
2 TABLET, FILM COATED ORAL NIGHTLY
Qty: 180 TABLET | Refills: 1 | Status: SHIPPED | OUTPATIENT
Start: 2025-07-21 | End: 2026-01-17

## 2025-07-21 ASSESSMENT — ENCOUNTER SYMPTOMS
CONSTIPATION: 1
WHEEZING: 0
SHORTNESS OF BREATH: 0

## 2025-07-21 NOTE — PATIENT INSTRUCTIONS
I will let you know about the Jardiance after I get the results of your urine tomorrow.    The Remedy Antifungal powder is an over the counter medication that I am unable to order. I did send in a prescription strength fungal powder called nystatin instead.

## 2025-07-21 NOTE — PROGRESS NOTES
Post-Discharge Transitional Care Follow Up      Courtney Taylor   YOB: 1940    Date of Office Visit:  7/21/2025  Date of Hospital Admission: 5/21/25  Date of Hospital Discharge: 5/30/25  SNF admission Date: 05/30/25  SNF [facility]: discharged 07/17/25  Readmission Risk Score (high >=14%. Medium >=10%):Readmission Risk Score: 15.4      Care management risk score Rising risk (score 2-5) and Complex Care (Scores >=6): No Risk Score On File     Non face to face  following discharge, date last encounter closed (first attempt may have been earlier): 07/17/2025     Call initiated 2 business days of discharge: Yes     Hospital discharge follow-up  -     [facility] DISCHARGE MEDS RECONCILED W/ CURRENT OUTPATIENT MED LIST  -    reviewed notes, labs and imaging from hospital and SNF with the patient today  Dysuria  -     POCT Urinalysis no Micro: negative, will send for culture, repeat renal function today  -     Culture, Urine  -     Renal Function Panel; Future  Type 2 diabetes mellitus with diabetic microalbuminuria, without long-term current use of insulin (HCC)  -     Albumin/Creatinine Ratio, Urine- will check micro and see if she will need to keep her jardiance. Her last A1C is within normal range  Drug-induced constipation  -     sennosides-docusate sodium (SENOKOT-S) 8.6-50 MG tablet; Take 2 tablets by mouth at bedtime, Disp-180 tablet, R-1Normal  -     continue with senokot to help relieve constipation. Increase fiber if able  Right hand pain  -     XR HAND RIGHT (MIN 3 VIEWS); Future  -     suspect arthritis given point tenderness, there is no major swelling, erythema or cording noted in the arm on exam  Intertrigo  -     nystatin (MYCOSTATIN) 177699 UNIT/GM powder; Apply to groin and under breasts twice per day for redness, Disp-60 g, R-2, Normal  -     unable to order remedy as it is an otc brand, will send in nystatin      Medical Decision Making: high complexity  Return in about 3 months

## 2025-07-21 NOTE — TELEPHONE ENCOUNTER
THERESA Lopez with [facility], called stating that she will see the patient weekly has well PT coming out weekly.   If we need to reach Jessica, please call [phone]. Thanks.

## 2025-07-22 LAB
ALBUMIN SERPL-MCNC: 3.9 G/DL (ref 3.4–5)
ANION GAP SERPL CALCULATED.3IONS-SCNC: 11 MMOL/L (ref 3–16)
BUN SERPL-MCNC: 15 MG/DL (ref 7–20)
CALCIUM SERPL-MCNC: 9.6 MG/DL (ref 8.3–10.6)
CHLORIDE SERPL-SCNC: 101 MMOL/L (ref 99–110)
CO2 SERPL-SCNC: 29 MMOL/L (ref 21–32)
CREAT SERPL-MCNC: 0.7 MG/DL (ref 0.6–1.2)
CREAT UR-MCNC: 84.1 MG/DL (ref 28–259)
GFR SERPLBLD CREATININE-BSD FMLA CKD-EPI: 85 ML/MIN/{1.73_M2}
GLUCOSE SERPL-MCNC: 99 MG/DL (ref 70–99)
MICROALBUMIN UR DL<=1MG/L-MCNC: <1.2 MG/DL
MICROALBUMIN/CREAT UR: NORMAL MG/G (ref 0–30)
PHOSPHATE SERPL-MCNC: 3.4 MG/DL (ref 2.5–4.9)
POTASSIUM SERPL-SCNC: 4.9 MMOL/L (ref 3.5–5.1)
SODIUM SERPL-SCNC: 141 MMOL/L (ref 136–145)

## 2025-07-23 LAB — BACTERIA UR CULT: NORMAL

## 2025-07-31 ENCOUNTER — TELEPHONE (OUTPATIENT)
Dept: FAMILY MEDICINE CLINIC | Age: 85
End: 2025-07-31

## 2025-08-18 DIAGNOSIS — I50.812 CHRONIC RIGHT-SIDED HEART FAILURE (HCC): ICD-10-CM

## 2025-08-18 DIAGNOSIS — F03.A0 MILD DEMENTIA WITHOUT BEHAVIORAL DISTURBANCE, PSYCHOTIC DISTURBANCE, MOOD DISTURBANCE, OR ANXIETY, UNSPECIFIED DEMENTIA TYPE (HCC): ICD-10-CM

## 2025-08-18 RX ORDER — MEMANTINE HYDROCHLORIDE 10 MG/1
10 TABLET ORAL DAILY
Qty: 90 TABLET | Refills: 1 | Status: SHIPPED | OUTPATIENT
Start: 2025-08-18 | End: 2026-02-14

## 2025-08-18 RX ORDER — METOPROLOL TARTRATE 25 MG/1
25 TABLET, FILM COATED ORAL DAILY
Qty: 90 TABLET | Refills: 1 | Status: SHIPPED | OUTPATIENT
Start: 2025-08-18

## 2025-08-18 RX ORDER — TAMSULOSIN HYDROCHLORIDE 0.4 MG/1
0.4 CAPSULE ORAL DAILY
Qty: 90 CAPSULE | Refills: 1 | Status: SHIPPED | OUTPATIENT
Start: 2025-08-18

## 2025-08-18 RX ORDER — GABAPENTIN 800 MG/1
800 TABLET ORAL 3 TIMES DAILY
Qty: 90 TABLET | Refills: 3 | Status: SHIPPED | OUTPATIENT
Start: 2025-08-18 | End: 2025-09-17

## 2025-09-04 ENCOUNTER — APPOINTMENT (OUTPATIENT)
Dept: GENERAL RADIOLOGY | Age: 85
End: 2025-09-04
Payer: MEDICARE

## 2025-09-04 ENCOUNTER — HOSPITAL ENCOUNTER (EMERGENCY)
Age: 85
Discharge: HOME | End: 2025-09-04
Attending: STUDENT IN AN ORGANIZED HEALTH CARE EDUCATION/TRAINING PROGRAM
Payer: MEDICARE

## 2025-09-04 ENCOUNTER — APPOINTMENT (OUTPATIENT)
Dept: CT IMAGING | Age: 85
End: 2025-09-04
Payer: MEDICARE

## 2025-09-04 VITALS
DIASTOLIC BLOOD PRESSURE: 90 MMHG | HEART RATE: 72 BPM | SYSTOLIC BLOOD PRESSURE: 188 MMHG | WEIGHT: 212 LBS | RESPIRATION RATE: 16 BRPM | BODY MASS INDEX: 41.62 KG/M2 | HEIGHT: 60 IN | TEMPERATURE: 98.7 F | OXYGEN SATURATION: 97 %

## 2025-09-04 DIAGNOSIS — M79.10 MYALGIA: Primary | ICD-10-CM

## 2025-09-04 LAB
ALBUMIN SERPL-MCNC: 4.1 G/DL (ref 3.4–5)
ALBUMIN/GLOB SERPL: 1.6 {RATIO} (ref 1.1–2.2)
ALP SERPL-CCNC: 104 U/L (ref 40–129)
ALT SERPL-CCNC: 13 U/L (ref 10–40)
ANION GAP SERPL CALCULATED.3IONS-SCNC: 10 MMOL/L (ref 3–16)
AST SERPL-CCNC: 27 U/L (ref 15–37)
BASOPHILS # BLD: 0 K/UL (ref 0–0.2)
BASOPHILS NFR BLD: 0.6 %
BILIRUB SERPL-MCNC: 0.5 MG/DL (ref 0–1)
BUN SERPL-MCNC: 17 MG/DL (ref 7–20)
CALCIUM SERPL-MCNC: 9.3 MG/DL (ref 8.3–10.6)
CHLORIDE SERPL-SCNC: 104 MMOL/L (ref 99–110)
CO2 SERPL-SCNC: 28 MMOL/L (ref 21–32)
CREAT SERPL-MCNC: 0.7 MG/DL (ref 0.6–1.2)
DEPRECATED RDW RBC AUTO: 15.8 % (ref 12.4–15.4)
EKG ATRIAL RATE: 77 BPM
EKG DIAGNOSIS: NORMAL
EKG P AXIS: 15 DEGREES
EKG P-R INTERVAL: 176 MS
EKG Q-T INTERVAL: 424 MS
EKG QRS DURATION: 96 MS
EKG QTC CALCULATION (BAZETT): 479 MS
EKG R AXIS: -33 DEGREES
EKG T AXIS: 40 DEGREES
EKG VENTRICULAR RATE: 77 BPM
EOSINOPHIL # BLD: 0.1 K/UL (ref 0–0.6)
EOSINOPHIL NFR BLD: 1.4 %
GFR SERPLBLD CREATININE-BSD FMLA CKD-EPI: 85 ML/MIN/{1.73_M2}
GLUCOSE SERPL-MCNC: 105 MG/DL (ref 70–99)
HCT VFR BLD AUTO: 36 % (ref 36–48)
HGB BLD-MCNC: 11.9 G/DL (ref 12–16)
LYMPHOCYTES # BLD: 1.9 K/UL (ref 1–5.1)
LYMPHOCYTES NFR BLD: 39.4 %
MCH RBC QN AUTO: 29.7 PG (ref 26–34)
MCHC RBC AUTO-ENTMCNC: 33 G/DL (ref 31–36)
MCV RBC AUTO: 90 FL (ref 80–100)
MONOCYTES # BLD: 0.4 K/UL (ref 0–1.3)
MONOCYTES NFR BLD: 7.4 %
NEUTROPHILS # BLD: 2.5 K/UL (ref 1.7–7.7)
NEUTROPHILS NFR BLD: 51.2 %
NT-PROBNP SERPL-MCNC: 573 PG/ML (ref 0–449)
PLATELET # BLD AUTO: 242 K/UL (ref 135–450)
PMV BLD AUTO: 7.3 FL (ref 5–10.5)
POTASSIUM SERPL-SCNC: 4.1 MMOL/L (ref 3.5–5.1)
PROT SERPL-MCNC: 6.7 G/DL (ref 6.4–8.2)
RBC # BLD AUTO: 4 M/UL (ref 4–5.2)
SODIUM SERPL-SCNC: 142 MMOL/L (ref 136–145)
TROPONIN, HIGH SENSITIVITY: 33 NG/L (ref 0–14)
TROPONIN, HIGH SENSITIVITY: 33 NG/L (ref 0–14)
WBC # BLD AUTO: 4.9 K/UL (ref 4–11)

## 2025-09-04 PROCEDURE — 71260 CT THORAX DX C+: CPT

## 2025-09-04 PROCEDURE — 84484 ASSAY OF TROPONIN QUANT: CPT

## 2025-09-04 PROCEDURE — 96374 THER/PROPH/DIAG INJ IV PUSH: CPT

## 2025-09-04 PROCEDURE — 71045 X-RAY EXAM CHEST 1 VIEW: CPT

## 2025-09-04 PROCEDURE — 6360000002 HC RX W HCPCS

## 2025-09-04 PROCEDURE — 93005 ELECTROCARDIOGRAM TRACING: CPT | Performed by: STUDENT IN AN ORGANIZED HEALTH CARE EDUCATION/TRAINING PROGRAM

## 2025-09-04 PROCEDURE — 80053 COMPREHEN METABOLIC PANEL: CPT

## 2025-09-04 PROCEDURE — 6360000004 HC RX CONTRAST MEDICATION: Performed by: STUDENT IN AN ORGANIZED HEALTH CARE EDUCATION/TRAINING PROGRAM

## 2025-09-04 PROCEDURE — 85025 COMPLETE CBC W/AUTO DIFF WBC: CPT

## 2025-09-04 PROCEDURE — 36415 COLL VENOUS BLD VENIPUNCTURE: CPT

## 2025-09-04 PROCEDURE — 99285 EMERGENCY DEPT VISIT HI MDM: CPT

## 2025-09-04 PROCEDURE — 93010 ELECTROCARDIOGRAM REPORT: CPT | Performed by: INTERNAL MEDICINE

## 2025-09-04 PROCEDURE — 96372 THER/PROPH/DIAG INJ SC/IM: CPT

## 2025-09-04 PROCEDURE — 83880 ASSAY OF NATRIURETIC PEPTIDE: CPT

## 2025-09-04 PROCEDURE — 6370000000 HC RX 637 (ALT 250 FOR IP)

## 2025-09-04 RX ORDER — LIDOCAINE 4 G/G
1 PATCH TOPICAL DAILY
Qty: 30 PATCH | Refills: 0 | Status: SHIPPED | OUTPATIENT
Start: 2025-09-04 | End: 2025-10-04

## 2025-09-04 RX ORDER — LIDOCAINE 4 G/G
1 PATCH TOPICAL DAILY
Status: DISCONTINUED | OUTPATIENT
Start: 2025-09-04 | End: 2025-09-04 | Stop reason: HOSPADM

## 2025-09-04 RX ORDER — ORPHENADRINE CITRATE 30 MG/ML
60 INJECTION INTRAMUSCULAR; INTRAVENOUS ONCE
Status: COMPLETED | OUTPATIENT
Start: 2025-09-04 | End: 2025-09-04

## 2025-09-04 RX ORDER — KETOROLAC TROMETHAMINE 30 MG/ML
15 INJECTION, SOLUTION INTRAMUSCULAR; INTRAVENOUS ONCE
Status: COMPLETED | OUTPATIENT
Start: 2025-09-04 | End: 2025-09-04

## 2025-09-04 RX ORDER — IOPAMIDOL 755 MG/ML
75 INJECTION, SOLUTION INTRAVASCULAR
Status: COMPLETED | OUTPATIENT
Start: 2025-09-04 | End: 2025-09-04

## 2025-09-04 RX ORDER — HYDROCODONE BITARTRATE AND ACETAMINOPHEN 5; 325 MG/1; MG/1
1 TABLET ORAL ONCE
Status: COMPLETED | OUTPATIENT
Start: 2025-09-04 | End: 2025-09-04

## 2025-09-04 RX ADMIN — IOPAMIDOL 75 ML: 755 INJECTION, SOLUTION INTRAVENOUS at 17:05

## 2025-09-04 RX ADMIN — HYDROCODONE BITARTRATE AND ACETAMINOPHEN 1 TABLET: 5; 325 TABLET ORAL at 16:38

## 2025-09-04 RX ADMIN — ORPHENADRINE CITRATE 60 MG: 60 INJECTION INTRAMUSCULAR; INTRAVENOUS at 16:38

## 2025-09-04 RX ADMIN — KETOROLAC TROMETHAMINE 15 MG: 30 INJECTION, SOLUTION INTRAMUSCULAR at 16:38

## 2025-09-04 ASSESSMENT — ENCOUNTER SYMPTOMS
COUGH: 0
RHINORRHEA: 0
SORE THROAT: 0
ABDOMINAL PAIN: 0
DIARRHEA: 0
VOMITING: 0
SHORTNESS OF BREATH: 0
BACK PAIN: 1
NAUSEA: 0

## 2025-09-04 ASSESSMENT — PAIN - FUNCTIONAL ASSESSMENT: PAIN_FUNCTIONAL_ASSESSMENT: 0-10
